# Patient Record
Sex: MALE | Race: WHITE | NOT HISPANIC OR LATINO | Employment: OTHER | URBAN - METROPOLITAN AREA
[De-identification: names, ages, dates, MRNs, and addresses within clinical notes are randomized per-mention and may not be internally consistent; named-entity substitution may affect disease eponyms.]

---

## 2017-02-21 ENCOUNTER — COMMUNICATION - HEALTHEAST (OUTPATIENT)
Dept: FAMILY MEDICINE | Facility: CLINIC | Age: 67
End: 2017-02-21

## 2017-02-21 DIAGNOSIS — G25.81 RESTLESS LEGS SYNDROME (RLS): ICD-10-CM

## 2017-02-22 ENCOUNTER — COMMUNICATION - HEALTHEAST (OUTPATIENT)
Dept: FAMILY MEDICINE | Facility: CLINIC | Age: 67
End: 2017-02-22

## 2017-07-25 ENCOUNTER — COMMUNICATION - HEALTHEAST (OUTPATIENT)
Dept: FAMILY MEDICINE | Facility: CLINIC | Age: 67
End: 2017-07-25

## 2017-07-25 DIAGNOSIS — I10 ESSENTIAL HYPERTENSION: ICD-10-CM

## 2017-09-26 ENCOUNTER — OFFICE VISIT - HEALTHEAST (OUTPATIENT)
Dept: FAMILY MEDICINE | Facility: CLINIC | Age: 67
End: 2017-09-26

## 2017-09-26 ENCOUNTER — RECORDS - HEALTHEAST (OUTPATIENT)
Dept: GENERAL RADIOLOGY | Facility: CLINIC | Age: 67
End: 2017-09-26

## 2017-09-26 ENCOUNTER — RECORDS - HEALTHEAST (OUTPATIENT)
Dept: ADMINISTRATIVE | Facility: OTHER | Age: 67
End: 2017-09-26

## 2017-09-26 DIAGNOSIS — M25.569 KNEE PAIN: ICD-10-CM

## 2017-09-26 DIAGNOSIS — M25.579 ANKLE PAIN: ICD-10-CM

## 2017-09-26 DIAGNOSIS — I10 HYPERTENSION: ICD-10-CM

## 2017-09-26 DIAGNOSIS — Z00.00 HEALTHCARE MAINTENANCE: ICD-10-CM

## 2017-09-26 DIAGNOSIS — M25.579 PAIN IN UNSPECIFIED ANKLE AND JOINTS OF UNSPECIFIED FOOT: ICD-10-CM

## 2017-09-26 LAB
CHOLEST SERPL-MCNC: 167 MG/DL
FASTING STATUS PATIENT QL REPORTED: YES
HDLC SERPL-MCNC: 40 MG/DL
LDLC SERPL CALC-MCNC: 98 MG/DL
PSA SERPL-MCNC: 7.2 NG/ML (ref 0–4.5)
TRIGL SERPL-MCNC: 143 MG/DL

## 2017-09-26 ASSESSMENT — MIFFLIN-ST. JEOR: SCORE: 1778.7

## 2017-09-28 ENCOUNTER — AMBULATORY - HEALTHEAST (OUTPATIENT)
Dept: FAMILY MEDICINE | Facility: CLINIC | Age: 67
End: 2017-09-28

## 2017-09-28 DIAGNOSIS — R97.20 ELEVATED PSA: ICD-10-CM

## 2017-10-03 ENCOUNTER — COMMUNICATION - HEALTHEAST (OUTPATIENT)
Dept: FAMILY MEDICINE | Facility: CLINIC | Age: 67
End: 2017-10-03

## 2017-10-23 ENCOUNTER — RECORDS - HEALTHEAST (OUTPATIENT)
Dept: ADMINISTRATIVE | Facility: OTHER | Age: 67
End: 2017-10-23

## 2017-12-01 ENCOUNTER — COMMUNICATION - HEALTHEAST (OUTPATIENT)
Dept: FAMILY MEDICINE | Facility: CLINIC | Age: 67
End: 2017-12-01

## 2017-12-01 ENCOUNTER — RECORDS - HEALTHEAST (OUTPATIENT)
Dept: ADMINISTRATIVE | Facility: OTHER | Age: 67
End: 2017-12-01

## 2017-12-10 ENCOUNTER — COMMUNICATION - HEALTHEAST (OUTPATIENT)
Dept: FAMILY MEDICINE | Facility: CLINIC | Age: 67
End: 2017-12-10

## 2017-12-10 DIAGNOSIS — G25.81 RESTLESS LEGS SYNDROME (RLS): ICD-10-CM

## 2017-12-18 ENCOUNTER — RECORDS - HEALTHEAST (OUTPATIENT)
Dept: ADMINISTRATIVE | Facility: OTHER | Age: 67
End: 2017-12-18

## 2017-12-22 ENCOUNTER — COMMUNICATION - HEALTHEAST (OUTPATIENT)
Dept: FAMILY MEDICINE | Facility: CLINIC | Age: 67
End: 2017-12-22

## 2018-01-24 ENCOUNTER — OFFICE VISIT - HEALTHEAST (OUTPATIENT)
Dept: FAMILY MEDICINE | Facility: CLINIC | Age: 68
End: 2018-01-24

## 2018-01-24 DIAGNOSIS — I48.0 PAROXYSMAL ATRIAL FIBRILLATION (H): ICD-10-CM

## 2018-01-24 DIAGNOSIS — Z01.818 PREOPERATIVE TESTING: ICD-10-CM

## 2018-01-24 DIAGNOSIS — C61 PROSTATE CANCER (H): ICD-10-CM

## 2018-01-24 LAB
ANION GAP SERPL CALCULATED.3IONS-SCNC: 9 MMOL/L (ref 5–18)
ATRIAL RATE - MUSE: 129 BPM
BUN SERPL-MCNC: 17 MG/DL (ref 8–22)
CALCIUM SERPL-MCNC: 9.9 MG/DL (ref 8.5–10.5)
CHLORIDE BLD-SCNC: 104 MMOL/L (ref 98–107)
CO2 SERPL-SCNC: 28 MMOL/L (ref 22–31)
CREAT SERPL-MCNC: 1.05 MG/DL (ref 0.7–1.3)
DIASTOLIC BLOOD PRESSURE - MUSE: NORMAL MMHG
GFR SERPL CREATININE-BSD FRML MDRD: >60 ML/MIN/1.73M2
GLUCOSE BLD-MCNC: 78 MG/DL (ref 70–125)
HGB BLD-MCNC: 15.5 G/DL (ref 14–18)
INTERPRETATION ECG - MUSE: NORMAL
P AXIS - MUSE: NORMAL DEGREES
POTASSIUM BLD-SCNC: 5.2 MMOL/L (ref 3.5–5)
PR INTERVAL - MUSE: NORMAL MS
QRS DURATION - MUSE: 98 MS
QT - MUSE: 350 MS
QTC - MUSE: 446 MS
R AXIS - MUSE: -19 DEGREES
SODIUM SERPL-SCNC: 141 MMOL/L (ref 136–145)
SYSTOLIC BLOOD PRESSURE - MUSE: NORMAL MMHG
T AXIS - MUSE: 14 DEGREES
VENTRICULAR RATE- MUSE: 98 BPM

## 2018-01-24 ASSESSMENT — MIFFLIN-ST. JEOR: SCORE: 1817.26

## 2018-01-26 ENCOUNTER — OFFICE VISIT - HEALTHEAST (OUTPATIENT)
Dept: CARDIOLOGY | Facility: CLINIC | Age: 68
End: 2018-01-26

## 2018-01-26 DIAGNOSIS — I48.0 PAROXYSMAL ATRIAL FIBRILLATION (H): ICD-10-CM

## 2018-01-26 DIAGNOSIS — I10 ESSENTIAL HYPERTENSION: ICD-10-CM

## 2018-01-26 LAB
ATRIAL RATE - MUSE: 69 BPM
DIASTOLIC BLOOD PRESSURE - MUSE: NORMAL MMHG
INTERPRETATION ECG - MUSE: NORMAL
P AXIS - MUSE: 31 DEGREES
PR INTERVAL - MUSE: 162 MS
QRS DURATION - MUSE: 106 MS
QT - MUSE: 420 MS
QTC - MUSE: 450 MS
R AXIS - MUSE: -29 DEGREES
SYSTOLIC BLOOD PRESSURE - MUSE: NORMAL MMHG
T AXIS - MUSE: 0 DEGREES
TSH SERPL DL<=0.005 MIU/L-ACNC: 1.55 UIU/ML (ref 0.3–5)
VENTRICULAR RATE- MUSE: 69 BPM

## 2018-01-26 ASSESSMENT — MIFFLIN-ST. JEOR: SCORE: 1808.19

## 2018-01-30 ENCOUNTER — HOSPITAL ENCOUNTER (OUTPATIENT)
Dept: CARDIOLOGY | Facility: HOSPITAL | Age: 68
Discharge: HOME | End: 2018-01-30
Attending: INTERNAL MEDICINE

## 2018-01-30 DIAGNOSIS — I48.0 PAROXYSMAL ATRIAL FIBRILLATION (H): ICD-10-CM

## 2018-01-30 LAB
AORTIC ROOT: 4 CM
AORTIC VALVE MEAN VELOCITY: 92.9 CM/S
ASCENDING AORTA: 4.3 CM
AV CUSP SEPERATION: 2.3 CM
AV CUSP SEPERATION: 2.3 CM
AV DIMENSIONLESS INDEX VTI: 0.7
AV MEAN GRADIENT: 4 MMHG
AV PEAK GRADIENT: 5.8 MMHG
AV VALVE AREA: 3 CM2
BSA FOR ECHO PROCEDURE: 2.25 M2
CV ECHO HEIGHT: 71 IN
CV ECHO WEIGHT: 224 LBS
DOP CALC AO PEAK VEL: 120 CM/S
DOP CALC AO VTI: 21.7 CM
DOP CALC LVOT AREA: 4.15 CM2
DOP CALC LVOT DIAMETER: 2.3 CM
DOP CALC LVOT STROKE VOLUME: 64.8 CM3
DOP CALC MV VTI: 15.9 CM
DOP CALCLVOT PEAK VEL VTI: 15.6 CM
EJECTION FRACTION: 63 % (ref 55–75)
FRACTIONAL SHORTENING: 29.4 % (ref 28–44)
INTERVENTRICULAR SEPTUM IN END DIASTOLE: 1.6 CM (ref 0.6–1)
IVS/PW RATIO: 1.2
LA AREA 2: 23.2 CM2
LEFT ATRIUM LENGTH: 5.92 CM
LEFT ATRIUM SIZE: 5.5 CM
LEFT ATRIUM TO AORTIC ROOT RATIO: 1.41 NO UNITS
LEFT VENTRICLE CARDIAC INDEX: 3.3 L/MIN/M2
LEFT VENTRICLE CARDIAC OUTPUT: 7.4 L/MIN
LEFT VENTRICLE DIASTOLIC VOLUME INDEX: 19.1 CM3/M2 (ref 34–74)
LEFT VENTRICLE DIASTOLIC VOLUME: 43 CM3 (ref 62–150)
LEFT VENTRICLE HEART RATE: 114 BPM
LEFT VENTRICLE MASS INDEX: 140.5 G/M2
LEFT VENTRICLE SYSTOLIC VOLUME INDEX: 7.1 CM3/M2 (ref 11–31)
LEFT VENTRICLE SYSTOLIC VOLUME: 16 CM3 (ref 21–61)
LEFT VENTRICULAR INTERNAL DIMENSION IN DIASTOLE: 5.1 CM (ref 4.2–5.8)
LEFT VENTRICULAR INTERNAL DIMENSION IN SYSTOLE: 3.6 CM (ref 2.5–4)
LEFT VENTRICULAR MASS: 316.2 G
LEFT VENTRICULAR OUTFLOW TRACT MEAN GRADIENT: 2 MMHG
LEFT VENTRICULAR OUTFLOW TRACT MEAN VELOCITY: 69.3 CM/S
LEFT VENTRICULAR POSTERIOR WALL IN END DIASTOLE: 1.3 CM (ref 0.6–1)
LV STROKE VOLUME INDEX: 28.8 ML/M2
MITRAL VALVE DECELERATION SLOPE: 4160 MM/S2
MITRAL VALVE MEAN INFLOW VELOCITY: 52.9 CM/S
MITRAL VALVE PEAK VELOCITY: 90.3 CM/S
MITRAL VALVE PRESSURE HALF-TIME: 65 MS
MV AREA VTI: 4.07 CM2
MV AVERAGE E/E' RATIO: 8.1 CM/S
MV DECELERATION TIME: 127 MS
MV E'TISSUE VEL-LAT: 15.2 CM/S
MV E'TISSUE VEL-MED: 7.22 CM/S
MV LATERAL E/E' RATIO: 6
MV MEAN GRADIENT: 1 MMHG
MV MEDIAL E/E' RATIO: 12.6
MV PEAK E VELOCITY: 90.8 CM/S
MV PEAK GRADIENT: 3.3 MMHG
MV VALVE AREA BY CONTINUITY EQUATION: 4.1 CM2
MV VALVE AREA PRESSURE 1/2 METHOD: 3.4 CM2
NUC REST DIASTOLIC VOLUME INDEX: 3584 LBS
NUC REST SYSTOLIC VOLUME INDEX: 71 IN
RIGHT VENTRICULAR INTERNAL DIMENSION IN DYSTOLE: 4.2 CM
TRICUSPID VALVE ANULAR PLANE SYSTOLIC EXCURSION: 1.9 CM

## 2018-01-30 ASSESSMENT — MIFFLIN-ST. JEOR: SCORE: 1808.19

## 2018-01-31 ENCOUNTER — COMMUNICATION - HEALTHEAST (OUTPATIENT)
Dept: CARDIOLOGY | Facility: CLINIC | Age: 68
End: 2018-01-31

## 2018-02-05 ASSESSMENT — MIFFLIN-ST. JEOR: SCORE: 1808.19

## 2018-02-06 ENCOUNTER — SURGERY - HEALTHEAST (OUTPATIENT)
Dept: SURGERY | Facility: HOSPITAL | Age: 68
End: 2018-02-06

## 2018-02-06 ENCOUNTER — ANESTHESIA - HEALTHEAST (OUTPATIENT)
Dept: SURGERY | Facility: HOSPITAL | Age: 68
End: 2018-02-06

## 2018-02-06 ASSESSMENT — MIFFLIN-ST. JEOR: SCORE: 1828.59

## 2018-02-07 ASSESSMENT — MIFFLIN-ST. JEOR: SCORE: 1826.78

## 2018-02-15 ENCOUNTER — RECORDS - HEALTHEAST (OUTPATIENT)
Dept: ADMINISTRATIVE | Facility: OTHER | Age: 68
End: 2018-02-15

## 2018-02-15 ENCOUNTER — COMMUNICATION - HEALTHEAST (OUTPATIENT)
Dept: CARDIOLOGY | Facility: CLINIC | Age: 68
End: 2018-02-15

## 2018-02-16 ENCOUNTER — COMMUNICATION - HEALTHEAST (OUTPATIENT)
Dept: CARDIOLOGY | Facility: CLINIC | Age: 68
End: 2018-02-16

## 2018-02-16 DIAGNOSIS — I48.91 A-FIB (H): ICD-10-CM

## 2018-03-02 ENCOUNTER — HOSPITAL ENCOUNTER (OUTPATIENT)
Dept: CARDIOLOGY | Facility: HOSPITAL | Age: 68
Discharge: HOME | End: 2018-03-02
Attending: INTERNAL MEDICINE

## 2018-03-02 DIAGNOSIS — I48.91 A-FIB (H): ICD-10-CM

## 2018-03-06 ENCOUNTER — COMMUNICATION - HEALTHEAST (OUTPATIENT)
Dept: CARDIOLOGY | Facility: CLINIC | Age: 68
End: 2018-03-06

## 2018-03-06 DIAGNOSIS — I48.91 A-FIB (H): ICD-10-CM

## 2018-03-13 ENCOUNTER — COMMUNICATION - HEALTHEAST (OUTPATIENT)
Dept: FAMILY MEDICINE | Facility: CLINIC | Age: 68
End: 2018-03-13

## 2018-03-13 DIAGNOSIS — I48.0 PAROXYSMAL ATRIAL FIBRILLATION (H): ICD-10-CM

## 2018-03-19 ENCOUNTER — OFFICE VISIT - HEALTHEAST (OUTPATIENT)
Dept: CARDIOLOGY | Facility: CLINIC | Age: 68
End: 2018-03-19

## 2018-03-19 DIAGNOSIS — I10 ESSENTIAL HYPERTENSION: ICD-10-CM

## 2018-03-19 DIAGNOSIS — I48.0 PAROXYSMAL ATRIAL FIBRILLATION (H): ICD-10-CM

## 2018-03-19 ASSESSMENT — MIFFLIN-ST. JEOR: SCORE: 1820.65

## 2018-04-06 ENCOUNTER — HOSPITAL ENCOUNTER (OUTPATIENT)
Dept: CARDIOLOGY | Facility: HOSPITAL | Age: 68
Discharge: HOME | End: 2018-04-06
Attending: INTERNAL MEDICINE

## 2018-04-06 DIAGNOSIS — I48.0 PAROXYSMAL ATRIAL FIBRILLATION (H): ICD-10-CM

## 2018-04-07 ENCOUNTER — COMMUNICATION - HEALTHEAST (OUTPATIENT)
Dept: FAMILY MEDICINE | Facility: CLINIC | Age: 68
End: 2018-04-07

## 2018-04-07 DIAGNOSIS — I48.0 PAROXYSMAL ATRIAL FIBRILLATION (H): ICD-10-CM

## 2018-04-16 ENCOUNTER — OFFICE VISIT - HEALTHEAST (OUTPATIENT)
Dept: FAMILY MEDICINE | Facility: CLINIC | Age: 68
End: 2018-04-16

## 2018-04-16 DIAGNOSIS — Z01.818 PREOPERATIVE EXAMINATION: ICD-10-CM

## 2018-04-16 DIAGNOSIS — M25.561 RIGHT KNEE PAIN: ICD-10-CM

## 2018-04-16 LAB
ALBUMIN SERPL-MCNC: 3.9 G/DL (ref 3.5–5)
ALP SERPL-CCNC: 125 U/L (ref 45–120)
ALT SERPL W P-5'-P-CCNC: 25 U/L (ref 0–45)
ANION GAP SERPL CALCULATED.3IONS-SCNC: 10 MMOL/L (ref 5–18)
AST SERPL W P-5'-P-CCNC: 24 U/L (ref 0–40)
BILIRUB SERPL-MCNC: 0.7 MG/DL (ref 0–1)
BUN SERPL-MCNC: 15 MG/DL (ref 8–22)
CALCIUM SERPL-MCNC: 9.8 MG/DL (ref 8.5–10.5)
CHLORIDE BLD-SCNC: 105 MMOL/L (ref 98–107)
CO2 SERPL-SCNC: 24 MMOL/L (ref 22–31)
CREAT SERPL-MCNC: 0.93 MG/DL (ref 0.7–1.3)
ERYTHROCYTE [DISTWIDTH] IN BLOOD BY AUTOMATED COUNT: 11.8 % (ref 11–14.5)
GFR SERPL CREATININE-BSD FRML MDRD: >60 ML/MIN/1.73M2
GLUCOSE BLD-MCNC: 72 MG/DL (ref 70–125)
HCT VFR BLD AUTO: 47.5 % (ref 40–54)
HGB BLD-MCNC: 16.2 G/DL (ref 14–18)
INR PPP: 1.07 (ref 0.9–1.1)
MCH RBC QN AUTO: 30.7 PG (ref 27–34)
MCHC RBC AUTO-ENTMCNC: 34.2 G/DL (ref 32–36)
MCV RBC AUTO: 90 FL (ref 80–100)
PLATELET # BLD AUTO: 192 THOU/UL (ref 140–440)
PMV BLD AUTO: 7.3 FL (ref 7–10)
POTASSIUM BLD-SCNC: 4.7 MMOL/L (ref 3.5–5)
PROT SERPL-MCNC: 6.6 G/DL (ref 6–8)
RBC # BLD AUTO: 5.29 MILL/UL (ref 4.4–6.2)
SODIUM SERPL-SCNC: 139 MMOL/L (ref 136–145)
WBC: 7.4 THOU/UL (ref 4–11)

## 2018-04-16 ASSESSMENT — MIFFLIN-ST. JEOR: SCORE: 1840.5

## 2018-04-18 ENCOUNTER — COMMUNICATION - HEALTHEAST (OUTPATIENT)
Dept: FAMILY MEDICINE | Facility: CLINIC | Age: 68
End: 2018-04-18

## 2018-04-19 ENCOUNTER — COMMUNICATION - HEALTHEAST (OUTPATIENT)
Dept: FAMILY MEDICINE | Facility: CLINIC | Age: 68
End: 2018-04-19

## 2018-05-01 ASSESSMENT — MIFFLIN-ST. JEOR: SCORE: 1828.59

## 2018-05-02 ENCOUNTER — ANESTHESIA - HEALTHEAST (OUTPATIENT)
Dept: SURGERY | Facility: CLINIC | Age: 68
End: 2018-05-02

## 2018-05-03 ENCOUNTER — SURGERY - HEALTHEAST (OUTPATIENT)
Dept: SURGERY | Facility: CLINIC | Age: 68
End: 2018-05-03

## 2018-05-03 ASSESSMENT — MIFFLIN-ST. JEOR
SCORE: 1828.59
SCORE: 1828.59

## 2018-05-11 ENCOUNTER — OFFICE VISIT - HEALTHEAST (OUTPATIENT)
Dept: FAMILY MEDICINE | Facility: CLINIC | Age: 68
End: 2018-05-11

## 2018-05-11 DIAGNOSIS — Z09 HOSPITAL DISCHARGE FOLLOW-UP: ICD-10-CM

## 2018-05-11 DIAGNOSIS — Z96.651 STATUS POST TOTAL RIGHT KNEE REPLACEMENT: ICD-10-CM

## 2018-05-11 ASSESSMENT — MIFFLIN-ST. JEOR: SCORE: 1869.7

## 2018-05-16 ENCOUNTER — RECORDS - HEALTHEAST (OUTPATIENT)
Dept: ADMINISTRATIVE | Facility: OTHER | Age: 68
End: 2018-05-16

## 2018-05-21 ENCOUNTER — RECORDS - HEALTHEAST (OUTPATIENT)
Dept: ADMINISTRATIVE | Facility: OTHER | Age: 68
End: 2018-05-21

## 2018-05-24 ENCOUNTER — COMMUNICATION - HEALTHEAST (OUTPATIENT)
Dept: ONCOLOGY | Facility: CLINIC | Age: 68
End: 2018-05-24

## 2018-06-12 ENCOUNTER — COMMUNICATION - HEALTHEAST (OUTPATIENT)
Dept: FAMILY MEDICINE | Facility: CLINIC | Age: 68
End: 2018-06-12

## 2018-06-12 DIAGNOSIS — I48.0 PAROXYSMAL ATRIAL FIBRILLATION (H): ICD-10-CM

## 2018-08-16 ENCOUNTER — RECORDS - HEALTHEAST (OUTPATIENT)
Dept: ADMINISTRATIVE | Facility: OTHER | Age: 68
End: 2018-08-16

## 2018-09-16 ENCOUNTER — COMMUNICATION - HEALTHEAST (OUTPATIENT)
Dept: FAMILY MEDICINE | Facility: CLINIC | Age: 68
End: 2018-09-16

## 2018-09-16 DIAGNOSIS — G25.81 RESTLESS LEGS SYNDROME (RLS): ICD-10-CM

## 2018-09-20 ENCOUNTER — COMMUNICATION - HEALTHEAST (OUTPATIENT)
Dept: ADMINISTRATIVE | Facility: CLINIC | Age: 68
End: 2018-09-20

## 2018-10-11 ENCOUNTER — COMMUNICATION - HEALTHEAST (OUTPATIENT)
Dept: FAMILY MEDICINE | Facility: CLINIC | Age: 68
End: 2018-10-11

## 2018-10-11 DIAGNOSIS — I48.0 PAROXYSMAL ATRIAL FIBRILLATION (H): ICD-10-CM

## 2018-10-12 ENCOUNTER — RECORDS - HEALTHEAST (OUTPATIENT)
Dept: ADMINISTRATIVE | Facility: OTHER | Age: 68
End: 2018-10-12

## 2018-10-17 ENCOUNTER — RECORDS - HEALTHEAST (OUTPATIENT)
Dept: ADMINISTRATIVE | Facility: OTHER | Age: 68
End: 2018-10-17

## 2018-10-25 ENCOUNTER — OFFICE VISIT - HEALTHEAST (OUTPATIENT)
Dept: CARDIOLOGY | Facility: CLINIC | Age: 68
End: 2018-10-25

## 2018-10-25 DIAGNOSIS — I10 ESSENTIAL HYPERTENSION: ICD-10-CM

## 2018-10-25 DIAGNOSIS — I48.0 PAROXYSMAL ATRIAL FIBRILLATION (H): ICD-10-CM

## 2018-10-25 ASSESSMENT — MIFFLIN-ST. JEOR: SCORE: 1819.52

## 2018-11-28 ENCOUNTER — RECORDS - HEALTHEAST (OUTPATIENT)
Dept: ADMINISTRATIVE | Facility: OTHER | Age: 68
End: 2018-11-28

## 2019-01-17 ENCOUNTER — COMMUNICATION - HEALTHEAST (OUTPATIENT)
Dept: FAMILY MEDICINE | Facility: CLINIC | Age: 69
End: 2019-01-17

## 2019-01-17 DIAGNOSIS — I48.0 PAROXYSMAL ATRIAL FIBRILLATION (H): ICD-10-CM

## 2019-02-05 ENCOUNTER — RECORDS - HEALTHEAST (OUTPATIENT)
Dept: ADMINISTRATIVE | Facility: OTHER | Age: 69
End: 2019-02-05

## 2019-06-08 ENCOUNTER — COMMUNICATION - HEALTHEAST (OUTPATIENT)
Dept: FAMILY MEDICINE | Facility: CLINIC | Age: 69
End: 2019-06-08

## 2019-06-08 DIAGNOSIS — I48.0 PAROXYSMAL ATRIAL FIBRILLATION (H): ICD-10-CM

## 2019-06-08 DIAGNOSIS — G25.81 RESTLESS LEGS SYNDROME (RLS): ICD-10-CM

## 2019-06-19 ENCOUNTER — OFFICE VISIT - HEALTHEAST (OUTPATIENT)
Dept: FAMILY MEDICINE | Facility: CLINIC | Age: 69
End: 2019-06-19

## 2019-06-19 DIAGNOSIS — I10 ESSENTIAL HYPERTENSION: ICD-10-CM

## 2019-06-19 DIAGNOSIS — I48.0 PAROXYSMAL ATRIAL FIBRILLATION (H): ICD-10-CM

## 2019-06-19 DIAGNOSIS — G25.81 RESTLESS LEGS SYNDROME (RLS): ICD-10-CM

## 2019-06-19 DIAGNOSIS — Z00.01 ENCOUNTER FOR GENERAL ADULT MEDICAL EXAMINATION WITH ABNORMAL FINDINGS: ICD-10-CM

## 2019-06-19 DIAGNOSIS — Z00.00 ROUTINE GENERAL MEDICAL EXAMINATION AT A HEALTH CARE FACILITY: ICD-10-CM

## 2019-06-19 DIAGNOSIS — C61 PROSTATE CANCER (H): ICD-10-CM

## 2019-06-19 LAB
ANION GAP SERPL CALCULATED.3IONS-SCNC: 5 MMOL/L (ref 5–18)
BUN SERPL-MCNC: 15 MG/DL (ref 8–22)
CALCIUM SERPL-MCNC: 9.6 MG/DL (ref 8.5–10.5)
CHLORIDE BLD-SCNC: 106 MMOL/L (ref 98–107)
CHOLEST SERPL-MCNC: 196 MG/DL
CO2 SERPL-SCNC: 28 MMOL/L (ref 22–31)
CREAT SERPL-MCNC: 1.05 MG/DL (ref 0.7–1.3)
FASTING STATUS PATIENT QL REPORTED: YES
GFR SERPL CREATININE-BSD FRML MDRD: >60 ML/MIN/1.73M2
GLUCOSE BLD-MCNC: 104 MG/DL (ref 70–125)
HDLC SERPL-MCNC: 37 MG/DL
LDLC SERPL CALC-MCNC: 122 MG/DL
POTASSIUM BLD-SCNC: 4.6 MMOL/L (ref 3.5–5)
SODIUM SERPL-SCNC: 139 MMOL/L (ref 136–145)
TRIGL SERPL-MCNC: 185 MG/DL

## 2019-06-19 ASSESSMENT — MIFFLIN-ST. JEOR: SCORE: 1858.52

## 2019-06-21 ENCOUNTER — COMMUNICATION - HEALTHEAST (OUTPATIENT)
Dept: CARDIOLOGY | Facility: CLINIC | Age: 69
End: 2019-06-21

## 2019-06-27 ENCOUNTER — COMMUNICATION - HEALTHEAST (OUTPATIENT)
Dept: FAMILY MEDICINE | Facility: CLINIC | Age: 69
End: 2019-06-27

## 2019-06-27 DIAGNOSIS — E78.5 HYPERLIPIDEMIA LDL GOAL <100: ICD-10-CM

## 2019-07-05 ENCOUNTER — RECORDS - HEALTHEAST (OUTPATIENT)
Dept: ADMINISTRATIVE | Facility: OTHER | Age: 69
End: 2019-07-05

## 2019-07-12 ENCOUNTER — COMMUNICATION - HEALTHEAST (OUTPATIENT)
Dept: FAMILY MEDICINE | Facility: CLINIC | Age: 69
End: 2019-07-12

## 2019-07-12 DIAGNOSIS — I48.0 PAROXYSMAL ATRIAL FIBRILLATION (H): ICD-10-CM

## 2019-08-17 ENCOUNTER — COMMUNICATION - HEALTHEAST (OUTPATIENT)
Dept: FAMILY MEDICINE | Facility: CLINIC | Age: 69
End: 2019-08-17

## 2019-08-17 DIAGNOSIS — G25.81 RESTLESS LEGS SYNDROME (RLS): ICD-10-CM

## 2019-08-27 ENCOUNTER — COMMUNICATION - HEALTHEAST (OUTPATIENT)
Dept: ADMINISTRATIVE | Facility: CLINIC | Age: 69
End: 2019-08-27

## 2019-09-04 ENCOUNTER — COMMUNICATION - HEALTHEAST (OUTPATIENT)
Dept: FAMILY MEDICINE | Facility: CLINIC | Age: 69
End: 2019-09-04

## 2019-09-04 DIAGNOSIS — I48.0 PAROXYSMAL ATRIAL FIBRILLATION (H): ICD-10-CM

## 2019-09-26 ENCOUNTER — RECORDS - HEALTHEAST (OUTPATIENT)
Dept: ADMINISTRATIVE | Facility: OTHER | Age: 69
End: 2019-09-26

## 2019-10-01 ENCOUNTER — OFFICE VISIT - HEALTHEAST (OUTPATIENT)
Dept: CARDIOLOGY | Facility: CLINIC | Age: 69
End: 2019-10-01

## 2019-10-01 DIAGNOSIS — I10 ESSENTIAL HYPERTENSION: ICD-10-CM

## 2019-10-01 DIAGNOSIS — I48.0 PAROXYSMAL ATRIAL FIBRILLATION (H): ICD-10-CM

## 2019-10-01 ASSESSMENT — MIFFLIN-ST. JEOR: SCORE: 1825.92

## 2019-11-14 ENCOUNTER — COMMUNICATION - HEALTHEAST (OUTPATIENT)
Dept: CARDIOLOGY | Facility: CLINIC | Age: 69
End: 2019-11-14

## 2019-11-18 ENCOUNTER — AMBULATORY - HEALTHEAST (OUTPATIENT)
Dept: CARDIOLOGY | Facility: CLINIC | Age: 69
End: 2019-11-18

## 2019-11-18 DIAGNOSIS — I48.0 PAROXYSMAL ATRIAL FIBRILLATION (H): ICD-10-CM

## 2019-11-18 DIAGNOSIS — Z00.6 CLINICAL TRIAL EXAM: ICD-10-CM

## 2019-11-20 ENCOUNTER — AMBULATORY - HEALTHEAST (OUTPATIENT)
Dept: CARDIOLOGY | Facility: CLINIC | Age: 69
End: 2019-11-20

## 2019-11-20 DIAGNOSIS — I48.0 PAROXYSMAL ATRIAL FIBRILLATION (H): ICD-10-CM

## 2019-11-20 DIAGNOSIS — Z00.6 CLINICAL TRIAL EXAM: ICD-10-CM

## 2019-11-21 ENCOUNTER — COMMUNICATION - HEALTHEAST (OUTPATIENT)
Dept: CARDIOLOGY | Facility: CLINIC | Age: 69
End: 2019-11-21

## 2019-11-26 ENCOUNTER — COMMUNICATION - HEALTHEAST (OUTPATIENT)
Dept: FAMILY MEDICINE | Facility: CLINIC | Age: 69
End: 2019-11-26

## 2019-12-04 ENCOUNTER — OFFICE VISIT - HEALTHEAST (OUTPATIENT)
Dept: FAMILY MEDICINE | Facility: CLINIC | Age: 69
End: 2019-12-04

## 2019-12-04 DIAGNOSIS — I48.0 PAROXYSMAL ATRIAL FIBRILLATION (H): ICD-10-CM

## 2019-12-04 ASSESSMENT — MIFFLIN-ST. JEOR: SCORE: 1841.18

## 2019-12-14 ENCOUNTER — COMMUNICATION - HEALTHEAST (OUTPATIENT)
Dept: FAMILY MEDICINE | Facility: CLINIC | Age: 69
End: 2019-12-14

## 2019-12-23 ENCOUNTER — COMMUNICATION - HEALTHEAST (OUTPATIENT)
Dept: FAMILY MEDICINE | Facility: CLINIC | Age: 69
End: 2019-12-23

## 2019-12-23 DIAGNOSIS — I48.0 PAROXYSMAL ATRIAL FIBRILLATION (H): ICD-10-CM

## 2020-01-22 DIAGNOSIS — I10 BENIGN ESSENTIAL HYPERTENSION: ICD-10-CM

## 2020-01-22 DIAGNOSIS — Z71.89 COUNSELING ON HEALTH PROMOTION AND DISEASE PREVENTION: Primary | ICD-10-CM

## 2020-01-22 DIAGNOSIS — R06.00 DYSPNEA, UNSPECIFIED TYPE: ICD-10-CM

## 2020-01-22 DIAGNOSIS — I48.91 ATRIAL FIBRILLATION, UNSPECIFIED TYPE (H): ICD-10-CM

## 2020-01-22 DIAGNOSIS — R73.09 ELEVATED GLUCOSE: ICD-10-CM

## 2020-01-27 ENCOUNTER — RECORDS - HEALTHEAST (OUTPATIENT)
Dept: ADMINISTRATIVE | Facility: OTHER | Age: 70
End: 2020-01-27

## 2020-01-27 ENCOUNTER — OFFICE VISIT (OUTPATIENT)
Dept: CARDIOLOGY | Facility: CLINIC | Age: 70
End: 2020-01-27
Payer: COMMERCIAL

## 2020-01-27 VITALS
OXYGEN SATURATION: 98 % | SYSTOLIC BLOOD PRESSURE: 141 MMHG | HEART RATE: 64 BPM | DIASTOLIC BLOOD PRESSURE: 89 MMHG | WEIGHT: 226 LBS | HEIGHT: 72 IN | BODY MASS INDEX: 30.61 KG/M2

## 2020-01-27 DIAGNOSIS — R06.00 DYSPNEA, UNSPECIFIED TYPE: ICD-10-CM

## 2020-01-27 DIAGNOSIS — C61 PROSTATE CANCER (H): ICD-10-CM

## 2020-01-27 DIAGNOSIS — Z71.89 COUNSELING ON HEALTH PROMOTION AND DISEASE PREVENTION: ICD-10-CM

## 2020-01-27 DIAGNOSIS — I48.91 ATRIAL FIBRILLATION, UNSPECIFIED TYPE (H): ICD-10-CM

## 2020-01-27 DIAGNOSIS — I48.21 PERMANENT ATRIAL FIBRILLATION (H): Primary | ICD-10-CM

## 2020-01-27 DIAGNOSIS — R09.89 OTHER SPECIFIED SYMPTOMS AND SIGNS INVOLVING THE CIRCULATORY AND RESPIRATORY SYSTEMS: ICD-10-CM

## 2020-01-27 DIAGNOSIS — Z09 ENCOUNTER FOR FOLLOW-UP EXAMINATION AFTER COMPLETED TREATMENT FOR CONDITIONS OTHER THAN MALIGNANT NEOPLASM: ICD-10-CM

## 2020-01-27 DIAGNOSIS — I10 BENIGN ESSENTIAL HYPERTENSION: ICD-10-CM

## 2020-01-27 DIAGNOSIS — I10 ESSENTIAL HYPERTENSION: ICD-10-CM

## 2020-01-27 DIAGNOSIS — R73.09 ELEVATED GLUCOSE: ICD-10-CM

## 2020-01-27 LAB
CHOLEST SERPL-MCNC: 176 MG/DL
CREAT UR-MCNC: 135 MG/DL
CRP SERPL HS-MCNC: 2.2 MG/L
FEF 25/75: NORMAL
FEV-1: NORMAL
FEV1/FVC: NORMAL
FVC: NORMAL
GLUCOSE SERPL-MCNC: 92 MG/DL (ref 70–99)
HDLC SERPL-MCNC: 44 MG/DL
LDLC SERPL CALC-MCNC: 101 MG/DL
MICROALBUMIN UR-MCNC: 35 MG/L
MICROALBUMIN/CREAT UR: 25.7 MG/G CR (ref 0–17)
NONHDLC SERPL-MCNC: 133 MG/DL
NT-PROBNP SERPL-MCNC: 1325 PG/ML (ref 0–125)
TRIGL SERPL-MCNC: 156 MG/DL

## 2020-01-27 RX ORDER — PRAMIPEXOLE DIHYDROCHLORIDE 0.5 MG/1
0.25 TABLET ORAL 2 TIMES DAILY
COMMUNITY
Start: 2019-06-19 | End: 2021-08-18

## 2020-01-27 RX ORDER — ATENOLOL 50 MG/1
50 TABLET ORAL DAILY
COMMUNITY
Start: 2019-12-04 | End: 2021-11-01

## 2020-01-27 RX ORDER — SILDENAFIL 50 MG/1
100 TABLET, FILM COATED ORAL PRN
COMMUNITY
End: 2021-11-01

## 2020-01-27 SDOH — HEALTH STABILITY: MENTAL HEALTH: HOW OFTEN DO YOU HAVE 6 OR MORE DRINKS ON ONE OCCASION?: NEVER

## 2020-01-27 SDOH — HEALTH STABILITY: MENTAL HEALTH: HOW MANY STANDARD DRINKS CONTAINING ALCOHOL DO YOU HAVE ON A TYPICAL DAY?: 1 OR 2

## 2020-01-27 SDOH — HEALTH STABILITY: MENTAL HEALTH: HOW OFTEN DO YOU HAVE A DRINK CONTAINING ALCOHOL?: 4 OR MORE TIMES A WEEK

## 2020-01-27 ASSESSMENT — MIFFLIN-ST. JEOR: SCORE: 1838.13

## 2020-01-27 NOTE — PROGRESS NOTES
[facility] for Cardiovascular Disease Prevention - Exam Note    Active Problems   Patient Active Problem List    Diagnosis Date Noted     Atrial fibrillation (H)      Priority: Medium     Hypertension      Priority: Medium     Prostate cancer (H) 01/01/2018     Priority: Medium       Reason For Visit   Patient here for [facility] early detection of atherosclerosis and CVD exam.    Pain Evaluation  Current history of pain associated with this visit is: denied    HPI   Rogers Huffman is a 69 year old year old male with a history of persistent atrial fibrillation, hypertension, prostate cancer s/p prostatectomy in 2018, restless legs syndrome and s/p left knee replacement in 2018.  Today in clinic he denies chest pain at rest, with exercise or in the middle of the noc. He denies SOB at rest or with exercise, PND,  palpitations, calf pain, heart burn, lightheadedness or abdominal pain or ankle edema.  Dr. Kaiser Joseph  Is cardiologist in .        Nutrition assessment per patient report:   Foods with fat/cholesterol (fried foods, fatty meats, junk food):  does not eat on a regular basis   Fruits and vegetables (  cup cooked, 1 cup raw):  2 serving of vegetables/day. 2 servings of fruit/day  Caffeine (1 cup coffee, soda, etc):  3 cups of coffee  Alcohol servings (12 oz. beer, 4 oz. wine, 1  oz. in mixed drink):  1 glass of wine/5 days week  Calcium servings (dairy foods, 8 oz. milk, yogurt, cheese, ice cream):  milk with breakfast, yogurt every other day  Salt/sodium use:  rarely  Special dietary habits:  None    Activity  Patient is active 3 times per week for 50 or more minutes with Nordic skiing in the winter months and biking in the other seasons.  He bikes 5 times/week for 1-3 hours during the warmer weather.      Laboratory Results Review  We discussed laboratory results today including lipids targets and how foods influence cholesterol.    Weight  His perceived healthy weight is 200 pounds.  A normal BMI  of 25 is equal to 184 pounds.  The current BMI of 30.65 is high-risk range.  A weight reduction speed of 2-3 lbs per month for men is recommended.    PMH   Past Medical History:   Diagnosis Date     Atrial fibrillation (H)      Hypertension      Prostate cancer (H) 2018       PSH  Past Surgical History:   Procedure Laterality Date     APPENDECTOMY       [place] TOTAL KNEE REPLACEMENT  2018    right     PROSTATECTOMY, LYMPHADENECTOMY  2018     SHOULDER SURGERY  1985    left     TONSILLECTOMY         Current Meds   No current outpatient medications on file.       Allergies    Allergies not on file    Family Hx   Family History   Problem Relation Age of Onset     Leukemia Mother      Pulmonary fibrosis Father      Hypertension Brother      Hypertension Brother      Hyperlipidemia Brother        Social History  Rogers is retired in 2015.  He worked as an  at [employer] and is retired. His life is semi-active.  He is  with 3 adopted children.     Enjoyment of life is 8 with 10 enjoys life fully.    Tobacco History  History   Smoking Status     Former Smoker     Packs/day: 0.50     Years: 3.00     Types: Cigarettes   Smokeless Tobacco     Not on file     Comment: Smoked during college       ROS  CONSTITUTIONAL:  No fever, chills, or sweats. No weight gain/loss.   EENT:  No visual disturbance, ear ache, epistaxis, sore throat  ALLERGIES/IMMUNOLOGIC:  Negative  RESPIRATORY:  No cough, hemoptysis  CARDIOVASCULAR:  As per HPI  GI:  No nausea, vomiting, hematemesis, melena  :  No urinary frequency, dysuria, or hematuria  INTEGUMENT:  Negative  PSYCHIATRIC:  Negative  NEURO:  Negative  ENDOCRINE:  Negative  MUSCULOSKELETAL:  Negative     Vital Signs   Pulse 64   Ht 1.829 m (6')   Wt 102.5 kg (226 lb)   SpO2 98%   BMI 30.65 kg/m        Waist: 42.5 inches  Hip: 42 inches    Physical Exam   In general, WDWN elderly man in NAD, affect pleasant. Gain steady.  HEENT: NC/AT.  PERRLA.  EOMI.  Sclerae white, not injected.  Nares  clear.  Pharynx without erythema or exudate.  Dentition intact.    Neck: No adenopathy.  No thyromegaly.Carotids +4/4 bilaterally without bruits.  No jugular venous distension.   Lungs: Breath sounds clear anthony, no crackles, wheezing, or rhonchi noted.  Cor: Irregular, irregular heart rate.  S1, S2. No murmur, rub, click, or gallop. The PMI is in the 5th ICS in the midclavicular line.  Abdomen: Soft, nontender, nondistended. BS present in all 4 quadrants.  No hepatomegaly.  No abdominal aorta or renal artery bruits noted.  Extremities: No clubbing, cyanosis, or edema. The DP and PT pulses are +2/2 anthony.   Recent Labs  Lab Results   Component Value Date    GLC 92 01/27/2020      Lab Results   Component Value Date    NTBNP 1,325 (H) 01/27/2020     No results found for: NTBNPI   Lab Results   Component Value Date    UCRR 135 01/27/2020      Lab Results   Component Value Date    MICROL 35 01/27/2020      No results found for: MICROALBUMIN   No results found for: CRP   Lab Results   Component Value Date    CHOL 176 01/27/2020      Lab Results   Component Value Date    TRIG 156 (H) 01/27/2020      Lab Results   Component Value Date    HDL 44 01/27/2020      Lab Results   Component Value Date     (H) 01/27/2020      No results found for: VLDL   No results found for: CHOLHDLRATIO  Lab Results   Component Value Date    NHDL 133 (H) 01/27/2020          Reyna Test Results    BASIC SPIROMETRY: Summary of two attempts (see printout for details of results)  Results Estimated range for ht/age   FVC: 3.88 liter FVC: 3.89-5.86 liter   FEV1: 3.37 liter FEV1: 2.79-4.46 liter     History of asthma:  NO   History of respiratory infection current/recent:  NO    Spirometry Results:  normal      WALKING BLOOD PRESSURE RESPONSE (3 minute, 5 MET level walk)   Pre BP: 118/70 mmHg  3 min BP: 128/66 mmHg  1 min post BP:120/77 mmHg    Pre HR: 92 bpm  3 min HR: 110 bpm  1 min post HR: 88 bpm       RETINAL VASCULAR ASSESSMENT   Left Eye  Abnormality:  none  AV Ratio: 0.88    Right Eye Abnormality:  none  AV Ratio: 0.82     Retinal Assessment:  normal      ABDOMINAL AORTA ULTRASOUND (< 2.5 normal, borderline 2.5-2.9, abnormal > 3)   SupraIliac 1.93 cm    SupraRenal 2.25 cm    InfraRenal Proximal 1.90 cm    InfraRenal Distal 1.95 cm      Abdominal Aorta Assessment:  normal      LEFT VENTRICULAR ULTRASOUND MEASUREMENTS (adjusted for BSA)  LVIDD 42.8 mm   Septa 10.3mm   Posterior 9.7 mm     Left Ventricular US Assessment:  normal      Carotid Artery IMT measurements report and plaques in the small area examined:   Left IMT 0.612 mm  Plaques none    Right IMT 0.690 mm  Plaques small heterogeneous plaque in right bulb area size 1.2 mm.        ECG (see tracing):  normal sinus rhythm;  rate: 81 bpm      Arterial Elasticity per age and gender (see printout):   C1 13.6 mL/mmHg x 10  normal   C2 4.4 mL/mmHg x 100 normal   Supine blood pressure: 142/101 mmHg     Echocardiogram 30 January 2018:  1. Normal left ventricular size and systolic performance with ejection fraction of 60-65%.  2. No significant valvular heart disease.  3. Normal right ventricular size and systolic performance.  4. Moderate left atrial enlargement. Mild right atrial enlargement.    Holter monitor 6 April 2018:  1. Abnormal Holter monitor tracing by virtue of the persistent atrial fibrillation demonstrated throughout the recording interval.  2. The ventricular response appears to be well controlled.  3. No symptomatic recordings therefore correlation with patient's clinical events cannot be made.  4. No significant ventricular arrhythmia  5. No profound bradycardia or pauses      Assessment:      1. Cardiovascular:  Asymptomatic, he is not complaining of chest pain. ECG results atrial fibrillation, HR 81 bpm.  He is on Eliquis 5 mg BID.  CHADS2-Vasc score is 2. 2.9% annual embolic risk.      2. Blood Pressure:  Blood pressure in clinic today, /89.   He is taking 50 mg of atenolol.    Standing blood pressure is 118/70.     3. Lipids:Lipids:    HDL 44  Trig  156  He does not take a statin medicaiton.    4. Glucose:  Fasting glucose 92       5. Exercise routine:  He is active Nordic skiing in the winter and biking in the summer months.      6. Nutrition.  He eats a healthy diet but does not eat many vegetables and fruit on a daily basis.       7.  Consider ordering a coronary artery calcium score to further assess his cardiac risk.      8.  Consider a sleep study if risk factors are present.     7.  Return to [facility] CV Prevention clinic in 1-2  years.    Health Habit Summary:  Nutrition: Heart Healthy Eating:  most of the time   Exercise:  regularly active  Weight:  high-risk range  Tobacco Use:  past tobacco use; 2 pack year history    Full report to follow prevention team review of test results with scanned final report.    Time spent for patient visit was 60 minutes with more than half the time spent on counseling and coordination of care.    TREVA Robison CNP       CC  Patient Care Team:  Vivi Miguel MD as PCP - General (Family Practice)  SELF, REFERRED  Kaiser Joseph MD Cardiology

## 2020-01-27 NOTE — LETTER
1/27/2020    RE: Rogers Huffman  [address]  [place] [address]       Dear Colleague,    Thank you for the opportunity to participate in the care of your patient, Rogers Huffman, at the [facility] CARDIOVASCULAR DISEASE PREVENTION at [facility]. Please see a copy of my visit note below.    [facility] for Cardiovascular Disease Prevention - Exam Note    Active Problems   Patient Active Problem List    Diagnosis Date Noted     Atrial fibrillation (H)      Priority: Medium     Hypertension      Priority: Medium     Prostate cancer (H) 01/01/2018     Priority: Medium       Reason For Visit   Patient here for [facility] early detection of atherosclerosis and CVD exam.    Pain Evaluation  Current history of pain associated with this visit is: denied    HPI   Rogers Huffman is a 69 year old year old male with a history of persistent atrial fibrillation, hypertension, prostate cancer s/p prostatectomy in 2018, restless legs syndrome and s/p left knee replacement in 2018.  Today in clinic he denies chest pain at rest, with exercise or in the middle of the noc. He denies SOB at rest or with exercise, PND,  palpitations, calf pain, heart burn, lightheadedness or abdominal pain or ankle edema.  Dr. Kaiser Joseph  Is cardiologist in .        Nutrition assessment per patient report:   Foods with fat/cholesterol (fried foods, fatty meats, junk food):  does not eat on a regular basis   Fruits and vegetables (  cup cooked, 1 cup raw):  2 serving of vegetables/day. 2 servings of fruit/day  Caffeine (1 cup coffee, soda, etc):  3 cups of coffee  Alcohol servings (12 oz. beer, 4 oz. wine, 1  oz. in mixed drink):  1 glass of wine/5 days week  Calcium servings (dairy foods, 8 oz. milk, yogurt, cheese, ice cream):  milk with breakfast, yogurt every other day  Salt/sodium use:  rarely  Special dietary habits:  None    Activity  Patient is active 3 times per week for  50 or more minutes with Nordic skiing in the winter months and biking in the other seasons.  He bikes 5 times/week for 1-3 hours during the warmer weather.      Laboratory Results Review  We discussed laboratory results today including lipids targets and how foods influence cholesterol.    Weight  His perceived healthy weight is 200 pounds.  A normal BMI of 25 is equal to 184 pounds.  The current BMI of 30.65 is high-risk range.  A weight reduction speed of 2-3 lbs per month for men is recommended.    PMH   Past Medical History:   Diagnosis Date     Atrial fibrillation (H)      Hypertension      Prostate cancer (H) 2018       PSH  Past Surgical History:   Procedure Laterality Date     APPENDECTOMY       [place] TOTAL KNEE REPLACEMENT  2018    right     PROSTATECTOMY, LYMPHADENECTOMY  2018     SHOULDER SURGERY  1985    left     TONSILLECTOMY         Current Meds   No current outpatient medications on file.       Allergies    Allergies not on file    Family Hx   Family History   Problem Relation Age of Onset     Leukemia Mother      Pulmonary fibrosis Father      Hypertension Brother      Hypertension Brother      Hyperlipidemia Brother        Social History  Rogers is retired in 2015.  He worked as an  at [employer] and is retired. His life is semi-active.  He is  with 3 adopted children.     Enjoyment of life is 8 with 10 enjoys life fully.    Tobacco History  History   Smoking Status     Former Smoker     Packs/day: 0.50     Years: 3.00     Types: Cigarettes   Smokeless Tobacco     Not on file     Comment: Smoked during college       ROS  CONSTITUTIONAL:  No fever, chills, or sweats. No weight gain/loss.   EENT:  No visual disturbance, ear ache, epistaxis, sore throat  ALLERGIES/IMMUNOLOGIC:  Negative  RESPIRATORY:  No cough, hemoptysis  CARDIOVASCULAR:  As per HPI  GI:  No nausea, vomiting, hematemesis, melena  :  No urinary frequency, dysuria, or hematuria  INTEGUMENT:  Negative  PSYCHIATRIC:   Negative  NEURO:  Negative  ENDOCRINE:  Negative  MUSCULOSKELETAL:  Negative     Vital Signs   Pulse 64   Ht 1.829 m (6')   Wt 102.5 kg (226 lb)   SpO2 98%   BMI 30.65 kg/m         Waist: 42.5 inches  Hip: 42 inches    Physical Exam   In general, WDWN elderly man in NAD, affect pleasant. Gain steady.  HEENT: NC/AT.  PERRLA.  EOMI.  Sclerae white, not injected.  Nares clear.  Pharynx without erythema or exudate.  Dentition intact.    Neck: No adenopathy.  No thyromegaly.Carotids +4/4 bilaterally without bruits.  No jugular venous distension.   Lungs: Breath sounds clear anthony, no crackles, wheezing, or rhonchi noted.  Cor: Irregular, irregular heart rate.  S1, S2. No murmur, rub, click, or gallop. The PMI is in the 5th ICS in the midclavicular line.  Abdomen: Soft, nontender, nondistended. BS present in all 4 quadrants.  No hepatomegaly.  No abdominal aorta or renal artery bruits noted.  Extremities: No clubbing, cyanosis, or edema. The DP and PT pulses are +2/2 anthony.   Recent Labs  Lab Results   Component Value Date    GLC 92 01/27/2020      Lab Results   Component Value Date    NTBNP 1,325 (H) 01/27/2020     No results found for: NTBNPI   Lab Results   Component Value Date    UCRR 135 01/27/2020      Lab Results   Component Value Date    MICROL 35 01/27/2020      No results found for: MICROALBUMIN   No results found for: CRP   Lab Results   Component Value Date    CHOL 176 01/27/2020      Lab Results   Component Value Date    TRIG 156 (H) 01/27/2020      Lab Results   Component Value Date    HDL 44 01/27/2020      Lab Results   Component Value Date     (H) 01/27/2020      No results found for: VLDL   No results found for: CHOLHDLRATIO  Lab Results   Component Value Date    NHDL 133 (H) 01/27/2020          Reyna Test Results    BASIC SPIROMETRY: Summary of two attempts (see printout for details of results)  Results Estimated range for ht/age   FVC: 3.88 liter FVC: 3.89-5.86 liter   FEV1: 3.37 liter FEV1:  2.79-4.46 liter     History of asthma:  NO   History of respiratory infection current/recent:  NO    Spirometry Results:  normal      WALKING BLOOD PRESSURE RESPONSE (3 minute, 5 MET level walk)   Pre BP: 118/70 mmHg  3 min BP: 128/66 mmHg  1 min post BP:120/77 mmHg    Pre HR: 92 bpm  3 min HR: 110 bpm  1 min post HR: 88 bpm       RETINAL VASCULAR ASSESSMENT   Left Eye Abnormality:  none  AV Ratio: 0.88    Right Eye Abnormality:  none  AV Ratio: 0.82     Retinal Assessment:  normal      ABDOMINAL AORTA ULTRASOUND (< 2.5 normal, borderline 2.5-2.9, abnormal > 3)   SupraIliac 1.93 cm    SupraRenal 2.25 cm    InfraRenal Proximal 1.90 cm    InfraRenal Distal 1.95 cm      Abdominal Aorta Assessment:  normal      LEFT VENTRICULAR ULTRASOUND MEASUREMENTS (adjusted for BSA)  LVIDD 42.8 mm   Septa 10.3mm   Posterior 9.7 mm     Left Ventricular US Assessment:  normal      Carotid Artery IMT measurements report and plaques in the small area examined:   Left IMT 0.612 mm  Plaques none    Right IMT 0.690 mm  Plaques small heterogeneous plaque in right bulb area size 1.2 mm.        ECG (see tracing):  normal sinus rhythm;  rate: 81 bpm      Arterial Elasticity per age and gender (see printout):   C1 13.6 mL/mmHg x 10  normal   C2 4.4 mL/mmHg x 100 normal   Supine blood pressure: 142/101 mmHg     Echocardiogram 30 January 2018:  1. Normal left ventricular size and systolic performance with ejection fraction of 60-65%.  2. No significant valvular heart disease.  3. Normal right ventricular size and systolic performance.  4. Moderate left atrial enlargement. Mild right atrial enlargement.    Holter monitor 6 April 2018:  1. Abnormal Holter monitor tracing by virtue of the persistent atrial fibrillation demonstrated throughout the recording interval.  2. The ventricular response appears to be well controlled.  3. No symptomatic recordings therefore correlation with patient's clinical events cannot be made.  4. No significant  ventricular arrhythmia  5. No profound bradycardia or pauses      Assessment:      1. Cardiovascular:  Asymptomatic, he is not complaining of chest pain. ECG results atrial fibrillation, HR 81 bpm.  He is on Eliquis 5 mg BID.  CHADS2-Vasc score is 2. 2.9% annual embolic risk.      2. Blood Pressure:  Blood pressure in clinic today, /89.   He is taking 50 mg of atenolol.   Standing blood pressure is 118/70.     3. Lipids:Lipids:    HDL  44  Trig  156  He does not take a statin medicaiton.    4. Glucose:  Fasting glucose 92       5. Exercise routine:  He is active Nordic skiing in the winter and biking in the summer months.      6. Nutrition.  He eats a healthy diet but does not eat many vegetables and fruit on a daily basis.       7.  Consider ordering a coronary artery calcium score to further assess his cardiac risk.      8.  Consider a sleep study if risk factors are present.     7.  Return to [facility] CV Prevention clinic in 1-2  years.    Health Habit Summary:  Nutrition: Heart Healthy Eating:  most of the time   Exercise:  regularly active  Weight:  high-risk range  Tobacco Use:  past tobacco use; 2 pack year history    Full report to follow prevention team review of test results with scanned final report.    Time spent for patient visit was 60 minutes with more than half the time spent on counseling and coordination of care.    TREVA Robison CNP     CC  Patient Care Team:  Vivi Miguel MD as PCP - General (Family Practice)  SELF, REFERRED  Kaiser Joseph MD Cardiology

## 2020-01-28 LAB — INTERPRETATION ECG - MUSE: NORMAL

## 2020-02-12 ENCOUNTER — COMMUNICATION - HEALTHEAST (OUTPATIENT)
Dept: FAMILY MEDICINE | Facility: CLINIC | Age: 70
End: 2020-02-12

## 2020-02-12 DIAGNOSIS — E78.5 HYPERLIPIDEMIA LDL GOAL <100: ICD-10-CM

## 2020-02-17 ENCOUNTER — COMMUNICATION - HEALTHEAST (OUTPATIENT)
Dept: FAMILY MEDICINE | Facility: CLINIC | Age: 70
End: 2020-02-17

## 2020-02-24 ENCOUNTER — HEALTH MAINTENANCE LETTER (OUTPATIENT)
Age: 70
End: 2020-02-24

## 2020-04-07 ENCOUNTER — COMMUNICATION - HEALTHEAST (OUTPATIENT)
Dept: FAMILY MEDICINE | Facility: CLINIC | Age: 70
End: 2020-04-07

## 2020-04-07 DIAGNOSIS — I48.0 PAROXYSMAL ATRIAL FIBRILLATION (H): ICD-10-CM

## 2020-06-05 ENCOUNTER — RECORDS - HEALTHEAST (OUTPATIENT)
Dept: ADMINISTRATIVE | Facility: OTHER | Age: 70
End: 2020-06-05

## 2020-06-22 DIAGNOSIS — I10 BENIGN ESSENTIAL HYPERTENSION: ICD-10-CM

## 2020-06-22 DIAGNOSIS — E78.5 HYPERLIPIDEMIA LDL GOAL <100: Primary | ICD-10-CM

## 2020-06-22 RX ORDER — ATORVASTATIN CALCIUM 10 MG/1
10 TABLET, FILM COATED ORAL DAILY
Qty: 90 TABLET | Refills: 0 | Status: SHIPPED | OUTPATIENT
Start: 2020-06-22 | End: 2021-12-20

## 2020-07-08 ENCOUNTER — RECORDS - HEALTHEAST (OUTPATIENT)
Dept: ADMINISTRATIVE | Facility: OTHER | Age: 70
End: 2020-07-08

## 2020-07-15 ENCOUNTER — COMMUNICATION - HEALTHEAST (OUTPATIENT)
Dept: FAMILY MEDICINE | Facility: CLINIC | Age: 70
End: 2020-07-15

## 2020-07-15 DIAGNOSIS — I48.0 PAROXYSMAL ATRIAL FIBRILLATION (H): ICD-10-CM

## 2020-07-17 ENCOUNTER — RECORDS - HEALTHEAST (OUTPATIENT)
Dept: ADMINISTRATIVE | Facility: OTHER | Age: 70
End: 2020-07-17

## 2020-08-09 ENCOUNTER — COMMUNICATION - HEALTHEAST (OUTPATIENT)
Dept: FAMILY MEDICINE | Facility: CLINIC | Age: 70
End: 2020-08-09

## 2020-08-09 DIAGNOSIS — G25.81 RESTLESS LEGS SYNDROME (RLS): ICD-10-CM

## 2020-08-16 ENCOUNTER — COMMUNICATION - HEALTHEAST (OUTPATIENT)
Dept: FAMILY MEDICINE | Facility: CLINIC | Age: 70
End: 2020-08-16

## 2020-08-16 DIAGNOSIS — I48.0 PAROXYSMAL ATRIAL FIBRILLATION (H): ICD-10-CM

## 2020-08-20 ENCOUNTER — RECORDS - HEALTHEAST (OUTPATIENT)
Dept: ADMINISTRATIVE | Facility: OTHER | Age: 70
End: 2020-08-20

## 2020-09-10 ENCOUNTER — COMMUNICATION - HEALTHEAST (OUTPATIENT)
Dept: FAMILY MEDICINE | Facility: CLINIC | Age: 70
End: 2020-09-10

## 2020-09-18 ENCOUNTER — OFFICE VISIT - HEALTHEAST (OUTPATIENT)
Dept: FAMILY MEDICINE | Facility: CLINIC | Age: 70
End: 2020-09-18

## 2020-09-18 DIAGNOSIS — Z12.5 SCREENING FOR MALIGNANT NEOPLASM OF PROSTATE: ICD-10-CM

## 2020-09-18 DIAGNOSIS — I10 ESSENTIAL HYPERTENSION: ICD-10-CM

## 2020-09-18 DIAGNOSIS — E78.5 HYPERLIPIDEMIA LDL GOAL <100: ICD-10-CM

## 2020-09-18 DIAGNOSIS — I48.0 PAROXYSMAL ATRIAL FIBRILLATION (H): ICD-10-CM

## 2020-09-18 DIAGNOSIS — Z00.00 ROUTINE GENERAL MEDICAL EXAMINATION AT A HEALTH CARE FACILITY: ICD-10-CM

## 2020-09-18 DIAGNOSIS — Z13.220 ENCOUNTER FOR SCREENING FOR LIPOID DISORDERS: ICD-10-CM

## 2020-09-18 LAB
ALBUMIN SERPL-MCNC: 4.3 G/DL (ref 3.5–5)
ALP SERPL-CCNC: 110 U/L (ref 45–120)
ALT SERPL W P-5'-P-CCNC: 28 U/L (ref 0–45)
ANION GAP SERPL CALCULATED.3IONS-SCNC: 8 MMOL/L (ref 5–18)
AST SERPL W P-5'-P-CCNC: 31 U/L (ref 0–40)
BILIRUB SERPL-MCNC: 0.8 MG/DL (ref 0–1)
BUN SERPL-MCNC: 15 MG/DL (ref 8–22)
CALCIUM SERPL-MCNC: 9.8 MG/DL (ref 8.5–10.5)
CHLORIDE BLD-SCNC: 104 MMOL/L (ref 98–107)
CHOLEST SERPL-MCNC: 139 MG/DL
CO2 SERPL-SCNC: 27 MMOL/L (ref 22–31)
CREAT SERPL-MCNC: 1.01 MG/DL (ref 0.7–1.3)
FASTING STATUS PATIENT QL REPORTED: YES
GFR SERPL CREATININE-BSD FRML MDRD: >60 ML/MIN/1.73M2
GLUCOSE BLD-MCNC: 102 MG/DL (ref 70–125)
HDLC SERPL-MCNC: 46 MG/DL
LDLC SERPL CALC-MCNC: 77 MG/DL
POTASSIUM BLD-SCNC: 5.3 MMOL/L (ref 3.5–5)
PROT SERPL-MCNC: 6.8 G/DL (ref 6–8)
PSA SERPL-MCNC: <0.1 NG/ML (ref 0–4.5)
SODIUM SERPL-SCNC: 139 MMOL/L (ref 136–145)
TRIGL SERPL-MCNC: 82 MG/DL

## 2020-09-18 ASSESSMENT — MIFFLIN-ST. JEOR: SCORE: 1859.21

## 2020-09-21 LAB
25(OH)D3 SERPL-MCNC: 68.2 NG/ML (ref 30–80)
25(OH)D3 SERPL-MCNC: 68.2 NG/ML (ref 30–80)

## 2020-10-14 ENCOUNTER — COMMUNICATION - HEALTHEAST (OUTPATIENT)
Dept: FAMILY MEDICINE | Facility: CLINIC | Age: 70
End: 2020-10-14

## 2020-10-14 DIAGNOSIS — I48.0 PAROXYSMAL ATRIAL FIBRILLATION (H): ICD-10-CM

## 2020-10-25 ENCOUNTER — COMMUNICATION - HEALTHEAST (OUTPATIENT)
Dept: FAMILY MEDICINE | Facility: CLINIC | Age: 70
End: 2020-10-25

## 2020-10-25 DIAGNOSIS — I10 ESSENTIAL HYPERTENSION: ICD-10-CM

## 2020-11-09 ENCOUNTER — COMMUNICATION - HEALTHEAST (OUTPATIENT)
Dept: FAMILY MEDICINE | Facility: CLINIC | Age: 70
End: 2020-11-09

## 2020-11-09 DIAGNOSIS — G25.81 RESTLESS LEGS SYNDROME (RLS): ICD-10-CM

## 2020-11-18 ENCOUNTER — COMMUNICATION - HEALTHEAST (OUTPATIENT)
Dept: FAMILY MEDICINE | Facility: CLINIC | Age: 70
End: 2020-11-18

## 2020-12-07 ENCOUNTER — COMMUNICATION - HEALTHEAST (OUTPATIENT)
Dept: CARDIOLOGY | Facility: CLINIC | Age: 70
End: 2020-12-07

## 2020-12-07 ENCOUNTER — COMMUNICATION - HEALTHEAST (OUTPATIENT)
Dept: FAMILY MEDICINE | Facility: CLINIC | Age: 70
End: 2020-12-07

## 2020-12-09 ENCOUNTER — COMMUNICATION - HEALTHEAST (OUTPATIENT)
Dept: FAMILY MEDICINE | Facility: CLINIC | Age: 70
End: 2020-12-09

## 2020-12-09 DIAGNOSIS — I10 ESSENTIAL HYPERTENSION: ICD-10-CM

## 2020-12-13 ENCOUNTER — HEALTH MAINTENANCE LETTER (OUTPATIENT)
Age: 70
End: 2020-12-13

## 2021-01-11 ENCOUNTER — COMMUNICATION - HEALTHEAST (OUTPATIENT)
Dept: FAMILY MEDICINE | Facility: CLINIC | Age: 71
End: 2021-01-11

## 2021-01-11 DIAGNOSIS — I48.0 PAROXYSMAL ATRIAL FIBRILLATION (H): ICD-10-CM

## 2021-01-15 ENCOUNTER — COMMUNICATION - HEALTHEAST (OUTPATIENT)
Dept: FAMILY MEDICINE | Facility: CLINIC | Age: 71
End: 2021-01-15

## 2021-02-01 ENCOUNTER — COMMUNICATION - HEALTHEAST (OUTPATIENT)
Dept: FAMILY MEDICINE | Facility: CLINIC | Age: 71
End: 2021-02-01

## 2021-02-15 ENCOUNTER — COMMUNICATION - HEALTHEAST (OUTPATIENT)
Dept: FAMILY MEDICINE | Facility: CLINIC | Age: 71
End: 2021-02-15

## 2021-02-15 DIAGNOSIS — E78.5 HYPERLIPIDEMIA LDL GOAL <100: ICD-10-CM

## 2021-03-11 ENCOUNTER — COMMUNICATION - HEALTHEAST (OUTPATIENT)
Dept: FAMILY MEDICINE | Facility: CLINIC | Age: 71
End: 2021-03-11

## 2021-03-15 ENCOUNTER — COMMUNICATION - HEALTHEAST (OUTPATIENT)
Dept: FAMILY MEDICINE | Facility: CLINIC | Age: 71
End: 2021-03-15

## 2021-03-25 ENCOUNTER — OFFICE VISIT - HEALTHEAST (OUTPATIENT)
Dept: CARDIOLOGY | Facility: CLINIC | Age: 71
End: 2021-03-25

## 2021-03-25 DIAGNOSIS — I48.0 PAROXYSMAL ATRIAL FIBRILLATION (H): ICD-10-CM

## 2021-03-25 DIAGNOSIS — I10 HYPERTENSION, UNSPECIFIED TYPE: ICD-10-CM

## 2021-03-29 ENCOUNTER — COMMUNICATION - HEALTHEAST (OUTPATIENT)
Dept: CARDIOLOGY | Facility: CLINIC | Age: 71
End: 2021-03-29

## 2021-03-30 ENCOUNTER — TRANSFERRED RECORDS (OUTPATIENT)
Dept: HEALTH INFORMATION MANAGEMENT | Facility: CLINIC | Age: 71
End: 2021-03-30

## 2021-03-30 ENCOUNTER — COMMUNICATION - HEALTHEAST (OUTPATIENT)
Dept: CARDIOLOGY | Facility: CLINIC | Age: 71
End: 2021-03-30

## 2021-03-30 DIAGNOSIS — I10 ESSENTIAL HYPERTENSION: ICD-10-CM

## 2021-04-17 ENCOUNTER — HEALTH MAINTENANCE LETTER (OUTPATIENT)
Age: 71
End: 2021-04-17

## 2021-04-19 ENCOUNTER — COMMUNICATION - HEALTHEAST (OUTPATIENT)
Dept: FAMILY MEDICINE | Facility: CLINIC | Age: 71
End: 2021-04-19

## 2021-04-19 DIAGNOSIS — I10 ESSENTIAL HYPERTENSION: ICD-10-CM

## 2021-04-19 DIAGNOSIS — I48.0 PAROXYSMAL ATRIAL FIBRILLATION (H): ICD-10-CM

## 2021-05-16 ENCOUNTER — COMMUNICATION - HEALTHEAST (OUTPATIENT)
Dept: FAMILY MEDICINE | Facility: CLINIC | Age: 71
End: 2021-05-16

## 2021-05-16 DIAGNOSIS — G25.81 RESTLESS LEGS SYNDROME (RLS): ICD-10-CM

## 2021-05-26 VITALS
SYSTOLIC BLOOD PRESSURE: 138 MMHG | RESPIRATION RATE: 14 BRPM | HEART RATE: 102 BPM | TEMPERATURE: 97.5 F | DIASTOLIC BLOOD PRESSURE: 105 MMHG

## 2021-05-26 VITALS
DIASTOLIC BLOOD PRESSURE: 110 MMHG | HEART RATE: 82 BPM | TEMPERATURE: 97.3 F | SYSTOLIC BLOOD PRESSURE: 156 MMHG | RESPIRATION RATE: 14 BRPM

## 2021-05-27 ENCOUNTER — RECORDS - HEALTHEAST (OUTPATIENT)
Dept: ADMINISTRATIVE | Facility: CLINIC | Age: 71
End: 2021-05-27

## 2021-05-29 NOTE — TELEPHONE ENCOUNTER
RN cannot approve Refill Request    RN can NOT refill this medication Protocol failed and NO refill given      Sujatha Cole, [facility] Triage/Med Refill 6/10/2019    Requested Prescriptions   Pending Prescriptions Disp Refills     metoprolol tartrate (LOPRESSOR) 100 MG tablet [Pharmacy Med Name: METOPROLOL TART 100MG TAB] 180 tablet 3     Sig: TAKE 1 TABLET BY MOUTH TWICE DAILY       Beta-Blockers Refill Protocol Failed - 6/8/2019  8:35 PM        Failed - PCP or prescribing provider visit in past 12 months or next 3 months     Last office visit with prescriber/PCP: 5/11/2018 Vivi Miguel MD OR same dept: Visit date not found OR same specialty: 5/11/2018 Vivi Miguel MD  Last physical: 4/16/2018 Last MTM visit: Visit date not found   Next visit within 3 mo: Visit date not found  Next physical within 3 mo: Visit date not found  Prescriber OR PCP: Vivi Miguel MD  Last diagnosis associated with med order: 1. Paroxysmal atrial fibrillation (H)  - metoprolol tartrate (LOPRESSOR) 100 MG tablet [Pharmacy Med Name: METOPROLOL TART 100MG TAB]; TAKE 1 TABLET BY MOUTH TWICE DAILY  Dispense: 180 tablet; Refill: 3    2. Restless Legs Syndrome  - pramipexole (MIRAPEX) 0.5 MG tablet [Pharmacy Med Name: PRAMIPEXOLE 0.5MG   TAB]; TAKE 1 TABLET BY MOUTH ONCE DAILY AT BEDTIME  Dispense: 90 tablet; Refill: 2    If protocol passes may refill for 12 months if within 3 months of last provider visit (or a total of 15 months).             Passed - Blood pressure filed in past 12 months     BP Readings from Last 1 Encounters:   10/25/18 106/82             pramipexole (MIRAPEX) 0.5 MG tablet [Pharmacy Med Name: PRAMIPEXOLE 0.5MG   TAB] 90 tablet 2     Sig: TAKE 1 TABLET BY MOUTH ONCE DAILY AT BEDTIME       Parkinson's Meds I Refill Protocol Failed - 6/8/2019  8:35 PM        Failed - PCP or prescribing provider visit in past 6 months      Last office visit with prescriber/PCP: Visit date not found OR same  dept: Visit date not found OR same specialty: 5/11/2018 Vivi Miguel MD Last physical: Visit date not found Last MTM visit: Visit date not found     Next appt within 3 mo: Visit date not found  Next physical within 3 mo: Visit date not found  Prescriber OR PCP: Vivi Miguel MD  Last diagnosis associated with med order: 1. Paroxysmal atrial fibrillation (H)  - metoprolol tartrate (LOPRESSOR) 100 MG tablet [Pharmacy Med Name: METOPROLOL TART 100MG TAB]; TAKE 1 TABLET BY MOUTH TWICE DAILY  Dispense: 180 tablet; Refill: 3    2. Restless Legs Syndrome  - pramipexole (MIRAPEX) 0.5 MG tablet [Pharmacy Med Name: PRAMIPEXOLE 0.5MG   TAB]; TAKE 1 TABLET BY MOUTH ONCE DAILY AT BEDTIME  Dispense: 90 tablet; Refill: 2    If protocol passes may refill for 6 months if within 3 months of last provider visit (or a total of 9 months).

## 2021-05-29 NOTE — PROGRESS NOTES
Assessment and Plan:   Fasting labs pending  Immunizations updated - Tdap today - discussed Shingles vaccine and he will call insurance  Healthy lifestyle modifications discussed  Discussed self testicular exams  Colonoscopy UTD  PSA discussed and will be done at urology office next month  The following high BMI interventions were performed this visit: encouragement to exercise and weight monitoring        1. Routine general medical examination at a health care facility  - Lipid Banks FASTING  - Tdap vaccine,  8yo or older,  IM     2. Restless Legs Syndrome  Increase to 0.75mg at night if needed  - pramipexole (MIRAPEX) 0.5 MG tablet; Take 1.5 tablets (0.75 mg total) by mouth at bedtime.  Dispense: 135 tablet; Refill: 3     3. Paroxysmal atrial fibrillation (H)  Continue Eliquis.  Continue cardiology follow-up.     4. Prostate cancer (H)  Continue urology follow-up.     5. Essential hypertension  Blood pressure was slightly above goal today but was normal at cardiologist office a few months ago.  He will continue to monitor at home and contact me if it is consistently over 140/90  - Basic Metabolic Panel      The patient's current medical problems were reviewed.    I have had an Advance Directives discussion with the patient.  The following health maintenance schedule was reviewed with the patient and provided in printed form in the after visit summary:   Health Maintenance   Topic Date Due     ADVANCE DIRECTIVES DISCUSSED WITH PATIENT  10/04/2010     ZOSTER VACCINES (2 of 3) 01/13/2016     FALL RISK ASSESSMENT  07/14/2017     TD 18+ HE  05/09/2019     INFLUENZA VACCINE RULE BASED (Season Ended) 08/01/2019     COLONOSCOPY  07/14/2024     PNEUMOCOCCAL POLYSACCHARIDE VACCINE AGE 65 AND OVER  Completed     PNEUMOCOCCAL CONJUGATE VACCINE FOR ADULTS (PCV13 OR PREVNAR)  Completed        Subjective:   Chief Complaint: Rogers Huffman is an 68 y.o. male here for an Annual Wellness visit.        No concerns.  He is  overall doing well.  He has a past medical history significant for paroxysmal atrial fibrillation for which she sees cardiology and takes metoprolol.  He is currently taking Eliquis as well and doing well with the medication.     He has a history of prostate cancer status post TURP procedure about a year and a half ago and is been doing fairly well since then.  He does have some urinary leakage as well as some erectile dysfunction issues which he is working with urologist.  He is seeing them again next month.     He travels to [place] for several weeks in the winter.  He has an Ebike which he enjoys.        Review of Systems: Please see above.  The rest of the review of systems are negative for all systems.    Patient Care Team:  Vivi Miguel MD as PCP - General (Family Medicine)  Joseph Mccurdy MD as Physician (Urology)     Patient Active Problem List   Diagnosis     Essential hypertension     Abnormal Blood Chemistry     Cellulitis     Abdominal Pain In The Right Lower Belly (RLQ)     Tinea Cruris     Restless Legs Syndrome     Rotator Cuff Tendonitis     Paroxysmal atrial fibrillation (H)     Prostate cancer (H)     Tachycardia     S/P total knee arthroplasty     Past Medical History:   Diagnosis Date     Arthritis      Atrial fibrillation (H)      Hypertension      Prostate cancer (H)      Restless legs       Past Surgical History:   Procedure Laterality Date     ANTERIOR CRUCIATE LIGAMENT REPAIR Right      APPENDECTOMY  1969     HARDWARE REMOVAL Right 5/3/2018    Procedure: HARDWARE REMOVAL RIGHT KNEE, LATERAL RELEASE;  Surgeon: Twan Yin MD;  Location: [facility] OR;  Service:      HI LAP,PROSTATECTOMY,RADICAL,W/NERVE SPARE,INCL ROBOTIC Bilateral 2/6/2018    Procedure: ROBOTIC ASSISTED RADICAL RETROPUBIC PROSTATECTOMY BILATERAL PELVIC LYMPH NODE DISSECTION LYSIS OF ADHESIONS;  Surgeon: Wm Koo MD;  Location: [facility] OR;  Service: Urology     HI TOTAL KNEE  ARTHROPLASTY Right 5/3/2018    Procedure: RIGHT TOTAL KNEE ARTHROPLASTY, COMPUTER-ASSIST;  Surgeon: Twan Yin MD;  Location: [facility] Main OR;  Service: Orthopedics     SHOULDER SURGERY Left      TONSILLECTOMY        Family History   Problem Relation Age of Onset     Leukemia Mother      Hypertension Father      Cancer Maternal Aunt         bone cancer      Social History     Socioeconomic History     Marital status:      Spouse name: Not on file     Number of children: Not on file     Years of education: Not on file     Highest education level: Not on file   Occupational History     Not on file   Social Needs     Financial resource strain: Not on file     Food insecurity:     Worry: Not on file     Inability: Not on file     Transportation needs:     Medical: Not on file     Non-medical: Not on file   Tobacco Use     Smoking status: Former Smoker     Last attempt to quit: 1975     Years since quittin.4     Smokeless tobacco: Never Used   Substance and Sexual Activity     Alcohol use: Yes     Alcohol/week: 0.6 oz     Types: 1 Glasses of wine per week     Drug use: No     Sexual activity: Not on file   Lifestyle     Physical activity:     Days per week: Not on file     Minutes per session: Not on file     Stress: Not on file   Relationships     Social connections:     Talks on phone: Not on file     Gets together: Not on file     Attends [employer] service: Not on file     Active member of club or organization: Not on file     Attends meetings of clubs or organizations: Not on file     Relationship status: Not on file     Intimate partner violence:     Fear of current or ex partner: Not on file     Emotionally abused: Not on file     Physically abused: Not on file     Forced sexual activity: Not on file   Other Topics Concern     Not on file   Social History Narrative     Not on file      Current Outpatient Medications   Medication Sig Dispense Refill     apixaban (ELIQUIS) 5 mg Tab tablet Take 1  "tablet (5 mg total) by mouth 2 (two) times a day. 180 tablet 1     DOCOSAHEXANOIC ACID/EPA (FISH OIL ORAL) Take 600 mg by mouth daily.        metoprolol tartrate (LOPRESSOR) 100 MG tablet TAKE 1 TABLET BY MOUTH TWICE DAILY 180 tablet 0     multivitamin therapeutic tablet Take 1 tablet by mouth daily.       pramipexole (MIRAPEX) 0.5 MG tablet Take 1.5 tablets (0.75 mg total) by mouth at bedtime. 135 tablet 3     sildenafil (VIAGRA) 50 MG tablet Take 100 mg by mouth every other day.        No current facility-administered medications for this visit.       Objective:   Vital Signs:   Visit Vitals  /84   Pulse 68   Temp 97.8  F (36.6  C) (Oral)   Resp 12   Ht 5' 11.26\" (1.81 m)   Wt (!) 234 lb 3 oz (106.2 kg)   BMI 32.42 kg/m         VisionScreening:  No exam data present     PHYSICAL EXAM  Physical Exam:  General Appearance: Alert, cooperative, no distress, appears stated age  Head: Normocephalic, without obvious abnormality, atraumatic  Eyes: PERRL, conjunctiva/corneas clear, EOM's intact  Ears: Normal TM's and external ear canals, both ears   Nose:Nares normal, septum midline,mucosa normal, no drainage   Throat:Lips, mucosa, and tongue normal; teeth and gums normal  Neck: Supple, symmetrical, trachea midline, no adenopathy;  thyroid: not enlarged, symmetric, no tenderness/mass/nodules  Back: Symmetric, no curvature, ROM normal, no CVA tenderness   Lungs: Clear to auscultation bilaterally, respirations unlabored  Chest: no visible abnormalities.  Heart: Regular rate and rhythm, S1 and S2 normal, no murmur, rub, or gallop,   Abdomen: Soft, non-tender, bowel sounds active all four quadrants,  no masses, no organomegaly  :deferred - see cardiology  Extremities: Extremities normal, atraumatic, no cyanosis or edema  Skin: Skin color, texture, turgor normal, no rashes or lesions  Lymph nodes: Cervical, supraclavicular, and axillary nodes normal  Neurologic: Normal       Assessment Results 6/19/2019   Activities of " Daily Living No help needed   Instrumental Activities of Daily Living No help needed   Mini Cog Total Score 5   Some recent data might be hidden     A Mini-Cog score of 0-2 suggests the possibility of dementia, score of 3-5 suggests no dementia    Identified Health Risks:     Information on urinary incontinence and treatment options given to patient.  Patient's advanced directive was discussed and I am comfortable with the patient's wishes.

## 2021-05-29 NOTE — TELEPHONE ENCOUNTER
Dr. Joseph  [facility] is requesting patient hold Eliquis.   May he hold Eliquis for 3 days prior to colonoscopy?  Thank you,  Anna

## 2021-05-29 NOTE — TELEPHONE ENCOUNTER
----- Message from Rito Carlin sent at 6/21/2019  9:13 AM CDT -----  Contact: LIVIA BURNETTE  General phone call:    Caller: LIVIA BURNETTE     Primary cardiologist: Dr Joseph    Detailed reason for call: upcoming colonoscopy - need hold/bridge for ELIQUIS   [phone]  Opt 1 opt 1

## 2021-05-29 NOTE — TELEPHONE ENCOUNTER
Please help this patient make an appointment and route back to me for limited refill    Thanks!    BB

## 2021-05-30 NOTE — TELEPHONE ENCOUNTER
RN cannot approve Refill Request    RN can NOT refill this medication med is not covered by policy/route to provider. Last office visit: 5/11/2018 Vivi Miguel MD Last Physical: 6/19/2019 Last MTM visit: Visit date not found Last visit same specialty: 5/11/2018 Vivi iMguel MD.  Next visit within 3 mo: Visit date not found  Next physical within 3 mo: Visit date not found      Surya Garcia, [facility] Connection Triage/Med Refill 7/13/2019    Requested Prescriptions   Pending Prescriptions Disp Refills     ELIQUIS 5 mg Tab tablet [Pharmacy Med Name: ELIQUIS 5MG         TAB] 180 tablet 1     Sig: TAKE 1 TABLET BY MOUTH TWICE DAILY       Apixaban/Rivaroxaban/Dabigatran Refill Protocol Failed - 7/12/2019 11:15 PM        Failed - Route to appropriate pool/provider     Last Anticoagulation Summary:           Passed - Renal function test in last year     Creatinine   Date Value Ref Range Status   06/19/2019 1.05 0.70 - 1.30 mg/dL Final             Passed - PCP or prescribing provider visit in past 12 months       Last office visit with prescriber/PCP: 5/11/2018 Vivi Miguel MD OR same dept: Visit date not found OR same specialty: 5/11/2018 Vivi Miguel MD  Last physical: 6/19/2019 Last MTM visit: Visit date not found   Next visit within 3 mo: Visit date not found  Next physical within 3 mo: Visit date not found  Prescriber OR PCP: Vivi Miguel MD  Last diagnosis associated with med order: 1. Paroxysmal atrial fibrillation (H)  - ELIQUIS 5 mg Tab tablet [Pharmacy Med Name: ELIQUIS 5MG         TAB]; TAKE 1 TABLET BY MOUTH TWICE DAILY  Dispense: 180 tablet; Refill: 1    If protocol passes may refill for 12 months if within 3 months of last provider visit (or a total of 15 months).

## 2021-05-31 VITALS — WEIGHT: 224 LBS | BODY MASS INDEX: 31.36 KG/M2 | HEIGHT: 71 IN

## 2021-05-31 VITALS — BODY MASS INDEX: 31.64 KG/M2 | WEIGHT: 226 LBS | HEIGHT: 71 IN

## 2021-05-31 VITALS — BODY MASS INDEX: 31.36 KG/M2 | WEIGHT: 224 LBS | HEIGHT: 71 IN

## 2021-05-31 VITALS — HEIGHT: 72 IN | BODY MASS INDEX: 30.42 KG/M2 | WEIGHT: 224.6 LBS

## 2021-05-31 VITALS — HEIGHT: 71 IN | BODY MASS INDEX: 30.45 KG/M2 | WEIGHT: 217.5 LBS

## 2021-05-31 NOTE — TELEPHONE ENCOUNTER
Refill Approved    Rx renewed per Medication Renewal Policy. Medication was last renewed on 6/19/2019 with 3 refills. Noted dosage different than what is requested. Message sent to pharmacy.  Last office visit: 6/19/2019 with PCP Dr ALBERTA Miguel. Dosage increased at that visit.     Valerie Barrientos, [facility] Triage/Med Refill 8/17/2019     Requested Prescriptions   Pending Prescriptions Disp Refills     pramipexole (MIRAPEX) 0.5 MG tablet [Pharmacy Med Name: PRAMIPEXOLE 0.5MG   TAB] 90 tablet 0     Sig: TAKE 1 TABLET BY MOUTH ONCE DAILY AT BEDTIME       Parkinson's Meds I Refill Protocol Passed - 8/17/2019  8:34 AM        Passed - PCP or prescribing provider visit in past 6 months      Last office visit with prescriber/PCP: Visit date not found OR same dept: Visit date not found OR same specialty: 5/11/2018 Vivi Miguel MD Last physical: 6/19/2019 Last MTM visit: Visit date not found     Next appt within 3 mo: Visit date not found  Next physical within 3 mo: Visit date not found  Prescriber OR PCP: Vivi Miguel MD  Last diagnosis associated with med order: 1. Restless Legs Syndrome  - pramipexole (MIRAPEX) 0.5 MG tablet [Pharmacy Med Name: PRAMIPEXOLE 0.5MG   TAB]; TAKE 1 TABLET BY MOUTH ONCE DAILY AT BEDTIME  Dispense: 90 tablet; Refill: 0    If protocol passes may refill for 6 months if within 3 months of last provider visit (or a total of 9 months).

## 2021-06-01 VITALS — HEIGHT: 72 IN | BODY MASS INDEX: 30.48 KG/M2 | WEIGHT: 225 LBS

## 2021-06-01 VITALS — WEIGHT: 225 LBS | BODY MASS INDEX: 30.48 KG/M2 | HEIGHT: 72 IN

## 2021-06-01 VITALS — BODY MASS INDEX: 31.7 KG/M2 | WEIGHT: 234.06 LBS | HEIGHT: 72 IN

## 2021-06-01 VITALS — HEIGHT: 72 IN | WEIGHT: 229.38 LBS | BODY MASS INDEX: 31.07 KG/M2

## 2021-06-01 NOTE — TELEPHONE ENCOUNTER
Refill Approved    Rx renewed per Medication Renewal Policy. Medication was last renewed on 6/11/19.    Sujatha Cole, [facility] Triage/Med Refill 9/4/2019     Requested Prescriptions   Pending Prescriptions Disp Refills     metoprolol tartrate (LOPRESSOR) 100 MG tablet [Pharmacy Med Name: METOPROLOL TART 100MG TAB] 180 tablet 0     Sig: TAKE 1 TABLET BY MOUTH TWICE DAILY       Beta-Blockers Refill Protocol Passed - 9/4/2019 11:42 AM        Passed - PCP or prescribing provider visit in past 12 months or next 3 months     Last office visit with prescriber/PCP: 5/11/2018 Vivi Miguel MD OR same dept: Visit date not found OR same specialty: 5/11/2018 Vivi Miguel MD  Last physical: 6/19/2019 Last MTM visit: Visit date not found   Next visit within 3 mo: Visit date not found  Next physical within 3 mo: Visit date not found  Prescriber OR PCP: Vivi Miguel MD  Last diagnosis associated with med order: 1. Paroxysmal atrial fibrillation (H)  - metoprolol tartrate (LOPRESSOR) 100 MG tablet [Pharmacy Med Name: METOPROLOL TART 100MG TAB]; TAKE 1 TABLET BY MOUTH TWICE DAILY  Dispense: 180 tablet; Refill: 0    If protocol passes may refill for 12 months if within 3 months of last provider visit (or a total of 15 months).             Passed - Blood pressure filed in past 12 months     BP Readings from Last 1 Encounters:   06/19/19 130/84

## 2021-06-02 VITALS — WEIGHT: 223 LBS | HEIGHT: 72 IN | BODY MASS INDEX: 30.2 KG/M2

## 2021-06-03 VITALS
HEART RATE: 64 BPM | BODY MASS INDEX: 31.78 KG/M2 | HEIGHT: 71 IN | DIASTOLIC BLOOD PRESSURE: 86 MMHG | WEIGHT: 227 LBS | RESPIRATION RATE: 16 BRPM | SYSTOLIC BLOOD PRESSURE: 130 MMHG

## 2021-06-03 VITALS — BODY MASS INDEX: 32.79 KG/M2 | HEIGHT: 71 IN | WEIGHT: 234.19 LBS

## 2021-06-03 NOTE — TELEPHONE ENCOUNTER
Zestar Study Consent Visit    Study description: ECG and PPG Study: Zestar Study      Rogers Huffman a 69 y.o. male , was contacted by today to discuss participation in the Zestar study.     The patient called the Clinical Trials Office to inquire about study participation.  The consent form was reviewed with the patient.     The review of the study included:    Study purpose     Conflict of interest    Device description      Study visits    Risks of participation    Benefits (if any)    Alternatives    Voluntary participation    Confidentiality     Compensation/costs of participation    Study stipends    Injury and legal rights    The subject was queried in regards to his willingness to continue and come in for scheduled appointment. his questions were answered to his satisfaction.   The patient has given his preliminary agreement to volunteer to participate in the above noted study.     Plan: Rogers Huffman will come to [facility] on 11/18/19 to continue consent process. If he continues to agrees to participate, the study visit will be done on the same day.  he was instructed to eat as usual before coming for study visit and take medications as usual too. he was encouraged to wear comfortable clothes and shoes to the study appointment.       Rosa Tavera RN

## 2021-06-04 VITALS
HEART RATE: 88 BPM | SYSTOLIC BLOOD PRESSURE: 140 MMHG | WEIGHT: 230.4 LBS | BODY MASS INDEX: 32.26 KG/M2 | DIASTOLIC BLOOD PRESSURE: 102 MMHG | HEIGHT: 71 IN

## 2021-06-04 VITALS
HEIGHT: 71 IN | HEART RATE: 85 BPM | BODY MASS INDEX: 32.81 KG/M2 | SYSTOLIC BLOOD PRESSURE: 146 MMHG | RESPIRATION RATE: 16 BRPM | DIASTOLIC BLOOD PRESSURE: 86 MMHG | TEMPERATURE: 96.7 F | WEIGHT: 234.38 LBS

## 2021-06-04 NOTE — TELEPHONE ENCOUNTER
I've requested a refill of Eliquis because I'll be out of the country when the current supply runs out.  [facility] should be contacting you.    Last OV 12/4/19.  Rx queued for MD approval.

## 2021-06-04 NOTE — PROGRESS NOTES
Assessment/Plan:    Rogers Huffman is a 69 y.o. male presenting for:    1. Paroxysmal atrial fibrillation (H)  He has done very well with atenolol in the past and I will send this to the pharmacy for him.  Blood pressure is above goal particularly in the diastolic range.      He is leaving for vacation in [place] next week and will continue his propranolol until he gets back home.  Once he gets back home he will then switch to the atenolol and keep track of his blood pressures and pulse and send me a message with these results.  - atenolol (TENORMIN) 50 MG tablet; Take 1 tablet (50 mg total) by mouth daily.  Dispense: 90 tablet; Refill: 2        There are no discontinued medications.        Chief Complaint:  Chief Complaint   Patient presents with     Blood Pressure       Subjective:   Rogers Huffman is a pleasant 69-year-old gentleman with a past medical history significant for atrial fibrillation and hypertension presenting to the clinic today with concerns over blood pressures.    The patient brings a log of his blood pressures over the last few months.  Blood pressures unfortunately are generally ranging in the 140 systolic  over the low 100s diastolic.  The patient states that he has not really changed anything.  He was switched from atenolol to propranolol about 10 months ago when his atenolol was discontinued/backorder but has been doing okay since then.  Unfortunately, he went to be in a research study and his diastolic blood pressure was in the 100s.  Because of that he could not be in the study.    He has an apple watch that is able to gauge his pulse and his pulse is generally with an adequate control although sometimes in the higher 90s.   He is overall feeling well but is somewhat worried because of his blood pressures and his pulse.  He wonders about getting back on the atenolol as this seemed to work well for him in the past.    He is on Eliquis for anticoagulation purposes.    12 point review of  "systems completed and negative except for what has been described above    Social History     Tobacco Use   Smoking Status Former Smoker     Last attempt to quit: 1975     Years since quittin.9   Smokeless Tobacco Never Used       Current Outpatient Medications   Medication Sig     apixaban (ELIQUIS) 5 mg Tab tablet Take 1 tablet (5 mg total) by mouth 2 (two) times a day.     cholecalciferol, vitamin D3, 5,000 unit Tab Take by mouth.     DOCOSAHEXANOIC ACID/EPA (FISH OIL ORAL) Take 600 mg by mouth daily.      ELIQUIS 5 mg Tab tablet TAKE 1 TABLET BY MOUTH TWICE DAILY     metoprolol tartrate (LOPRESSOR) 100 MG tablet TAKE 1 TABLET BY MOUTH TWICE DAILY     multivitamin therapeutic tablet Take 1 tablet by mouth daily.     pramipexole (MIRAPEX) 0.5 MG tablet Take 1.5 tablets (0.75 mg total) by mouth at bedtime.     sildenafil (VIAGRA) 50 MG tablet Take 100 mg by mouth every other day.      atenolol (TENORMIN) 50 MG tablet Take 1 tablet (50 mg total) by mouth daily.         Objective:  Vitals:    19 1002   BP: (!) 140/102   Pulse: 88   Weight: (!) 230 lb 6.4 oz (104.5 kg)   Height: 5' 11.25\" (1.81 m)       Body mass index is 31.91 kg/m .    Vital signs reviewed and stable  General: No acute distress  Psych: Appropriate affect  HEENT: moist mucous membranes, pupils equal, round, reactive to light and accomodation, posterior oropharynx clear of erythema or exudate, tympanic membranes are pearly grey bilaterally  Lymph: no cervical or supraclavicular lymphadenopathy  Cardiovascular: Irregularly irregular rhythm with a regular rate  Pulmonary: clear to auscultation bilaterally with no wheeze  Abdomen: soft, non tender, non distended with normo-active bowel sounds  Extremities: warm and well perfused with no edema  Skin: warm and dry with no rash         This note has been dictated and transcribed using voice recognition software.   Any errors in transcription are unintentional and inherent to the software.  " no

## 2021-06-05 VITALS
WEIGHT: 235 LBS | SYSTOLIC BLOOD PRESSURE: 142 MMHG | DIASTOLIC BLOOD PRESSURE: 92 MMHG | BODY MASS INDEX: 32.55 KG/M2 | HEART RATE: 92 BPM | RESPIRATION RATE: 20 BRPM

## 2021-06-06 NOTE — TELEPHONE ENCOUNTER
Medication Request  Medication name: statin  Requested Pharmacy: [facility]  Reason for request:Patient went to [facility] for a cardio vascular screening.  They recommended he be on a statin.  Patient states this was also Dr Miguel assessment.  Patient is now willing to start statin.  He is leaving out of town next week so anxious to start this now.  He is asking if he needs to seen in clinic or if this medication can just be ordered? Please advise.    When did you use medication last?:  NA  Patient offered appointment:  Please advise.  Okay to leave a detailed message: yes [phone]

## 2021-06-06 NOTE — TELEPHONE ENCOUNTER
Dr. Miguel-  See request below to start a statin.  Last seen on 12/4/19.  Lab results message states:     Based on your age, race, smoking status, blood-pressure, and cholesterol levels your calculated 10 year risk for a vascular event (heart attack/stroke) is 15-18%.   Generally we will recommend starting a cholesterol medication once that risk is about 10%.     Based on this data I would recommend starting a statin medication.  Please let me know if you would be amendable to this and I can send it to the pharmacy!     Dr Miguel

## 2021-06-07 NOTE — TELEPHONE ENCOUNTER
RN cannot approve Refill Request    RN can NOT refill this medication Protocol failed and NO refill given.        Sujatha Cole, [facility] Triage/Med Refill 4/8/2020    Requested Prescriptions   Pending Prescriptions Disp Refills     ELIQUIS 5 mg Tab tablet [Pharmacy Med Name: Eliquis 5 MG Oral Tablet] 180 tablet 0     Sig: Take 1 tablet by mouth twice daily       Apixaban/Rivaroxaban/Dabigatran Refill Protocol Failed - 4/7/2020  7:47 PM        Failed - Route to appropriate pool/provider     Last Anticoagulation Summary:           Passed - Renal function test in last year     Creatinine   Date Value Ref Range Status   06/19/2019 1.05 0.70 - 1.30 mg/dL Final             Passed - PCP or prescribing provider visit in past 12 months       Last office visit with prescriber/PCP: 12/4/2019 Vivi Miguel MD OR same dept: 12/4/2019 Vivi Miguel MD OR same specialty: 12/4/2019 Vivi Miguel MD  Last physical: 6/19/2019 Last MTM visit: Visit date not found   Next visit within 3 mo: Visit date not found  Next physical within 3 mo: Visit date not found  Prescriber OR PCP: Vivi Miguel MD  Last diagnosis associated with med order: 1. Paroxysmal atrial fibrillation (H)  - ELIQUIS 5 mg Tab tablet [Pharmacy Med Name: Eliquis 5 MG Oral Tablet]; TAKE 1 TABLET BY MOUTH TWICE DAILY  Dispense: 180 tablet; Refill: 0    If protocol passes may refill for 12 months if within 3 months of last provider visit (or a total of 15 months).

## 2021-06-09 NOTE — TELEPHONE ENCOUNTER
RN cannot approve Refill Request    RN can NOT refill this medication med is not covered by policy/route to provider. Last office visit: 12/4/2019 Vivi Miguel MD Last Physical: 6/19/2019 Last MTM visit: Visit date not found Last visit same specialty: 12/4/2019 Vivi Miguel MD.  Next visit within 3 mo: Visit date not found  Next physical within 3 mo: Visit date not found      Thalia Miguel [facility] Triage/Med Refill 7/15/2020    Requested Prescriptions   Pending Prescriptions Disp Refills     ELIQUIS 5 mg Tab tablet [Pharmacy Med Name: Eliquis 5 MG Oral Tablet] 180 tablet 0     Sig: Take 1 tablet by mouth twice daily       Apixaban/Rivaroxaban/Dabigatran Refill Protocol Failed - 7/15/2020  8:49 PM        Failed - Renal function test in last year     Creatinine   Date Value Ref Range Status   06/19/2019 1.05 0.70 - 1.30 mg/dL Final             Failed - Route to appropriate pool/provider     Last Anticoagulation Summary:           Passed - PCP or prescribing provider visit in past 12 months       Last office visit with prescriber/PCP: 12/4/2019 Vivi Miguel MD OR same dept: 12/4/2019 Vivi Miguel MD OR same specialty: 12/4/2019 Vivi Miguel MD  Last physical: 6/19/2019 Last MTM visit: Visit date not found   Next visit within 3 mo: Visit date not found  Next physical within 3 mo: Visit date not found  Prescriber OR PCP: Vivi Miguel MD  Last diagnosis associated with med order: 1. Paroxysmal atrial fibrillation (H)  - ELIQUIS 5 mg Tab tablet [Pharmacy Med Name: Eliquis 5 MG Oral Tablet]; Take 1 tablet by mouth twice daily  Dispense: 180 tablet; Refill: 0    If protocol passes may refill for 12 months if within 3 months of last provider visit (or a total of 15 months).

## 2021-06-10 NOTE — TELEPHONE ENCOUNTER
Refill Approved    Rx renewed per Medication Renewal Policy. Medication was last renewed on 12/4/19.    Sujatha Cole, [facility] Triage/Med Refill 8/17/2020     Requested Prescriptions   Pending Prescriptions Disp Refills     atenoloL (TENORMIN) 50 MG tablet [Pharmacy Med Name: Atenolol 50 MG Oral Tablet] 90 tablet 0     Sig: Take 1 tablet by mouth once daily       Beta-Blockers Refill Protocol Passed - 8/16/2020  7:59 PM        Passed - PCP or prescribing provider visit in past 12 months or next 3 months     Last office visit with prescriber/PCP: 12/4/2019 Vivi Miguel MD OR same dept: 12/4/2019 Vivi Miguel MD OR same specialty: 12/4/2019 Vivi Miguel MD  Last physical: 6/19/2019 Last MTM visit: Visit date not found   Next visit within 3 mo: Visit date not found  Next physical within 3 mo: Visit date not found  Prescriber OR PCP: Vivi Miguel MD  Last diagnosis associated with med order: 1. Paroxysmal atrial fibrillation (H)  - atenoloL (TENORMIN) 50 MG tablet [Pharmacy Med Name: Atenolol 50 MG Oral Tablet]; Take 1 tablet by mouth once daily  Dispense: 90 tablet; Refill: 0    If protocol passes may refill for 12 months if within 3 months of last provider visit (or a total of 15 months).             Passed - Blood pressure filed in past 12 months     BP Readings from Last 1 Encounters:   12/04/19 (!) 140/102

## 2021-06-10 NOTE — TELEPHONE ENCOUNTER
RN cannot approve Refill Request    RN can NOT refill this medication PCP messaged that patient is overdue for Office Visit. Last office visit: 12/4/2019 Vivi Miguel MD Last Physical: 6/19/2019 Last MTM visit: Visit date not found Last visit same specialty: 12/4/2019 Vivi Miguel MD.  Next visit within 3 mo: Visit date not found  Next physical within 3 mo: Visit date not found      Lillian Araya, [facility] Triage/Med Refill 8/10/2020    Requested Prescriptions   Pending Prescriptions Disp Refills     pramipexole (MIRAPEX) 0.5 MG tablet [Pharmacy Med Name: Pramipexole Dihydrochloride 0.5 MG Oral Tablet] 135 tablet 0     Sig: TAKE 1 & 1/2 (ONE & ONE-HALF) TABLETS BY MOUTH AT BEDTIME       Parkinson's Meds I Refill Protocol Failed - 8/9/2020  8:52 AM        Failed - PCP or prescribing provider visit in past 6 months      Last office visit with prescriber/PCP: Visit date not found OR same dept: 12/4/2019 Vivi Miguel MD OR same specialty: 12/4/2019 Vivi Miguel MD Last physical: Visit date not found Last MTM visit: Visit date not found     Next appt within 3 mo: Visit date not found  Next physical within 3 mo: Visit date not found  Prescriber OR PCP: Vivi Miguel MD  Last diagnosis associated with med order: 1. Restless Legs Syndrome  - pramipexole (MIRAPEX) 0.5 MG tablet [Pharmacy Med Name: Pramipexole Dihydrochloride 0.5 MG Oral Tablet]; TAKE 1 & 1/2 (ONE & ONE-HALF) TABLETS BY MOUTH AT BEDTIME  Dispense: 135 tablet; Refill: 0    If protocol passes may refill for 6 months if within 3 months of last provider visit (or a total of 9 months).             Pramipexole/Ropinirole Refill Protocol Failed - 8/9/2020  8:52 AM        Failed - PCP or prescribing provider visit in past 6 months      Last office visit with prescriber/PCP: Visit date not found OR same dept: 12/4/2019 Vivi Miguel MD OR same specialty: 12/4/2019 Vivi Miguel MD Last  physical: Visit date not found Last MTM visit: Visit date not found         Next appt within 3 mo: Visit date not found  Next physical within 3 mo: Visit date not found  Prescriber OR PCP: Vivi Miguel MD  Last diagnosis associated with med order: 1. Restless Legs Syndrome  - pramipexole (MIRAPEX) 0.5 MG tablet [Pharmacy Med Name: Pramipexole Dihydrochloride 0.5 MG Oral Tablet]; TAKE 1 & 1/2 (ONE & ONE-HALF) TABLETS BY MOUTH AT BEDTIME  Dispense: 135 tablet; Refill: 0     If protocol passes may refill for 6 months if within 3 months of last provider visit (or a total of 9 months).

## 2021-06-11 NOTE — PROGRESS NOTES
Assessment and Plan:   Rogers is a very pleasant 70-year-old gentleman presenting for an annual wellness visit    Patient has been advised of split billing requirements and indicates understanding: Yes    1. Routine general medical examination at a health care facility  - Vitamin D, Total (25-Hydroxy)    2. Hyperlipidemia LDL goal <100    3. Essential hypertension  At goal  - Comprehensive Metabolic Panel  - Vitamin D, Total (25-Hydroxy)    4. Encounter for screening for lipoid disorders  - Lipid [place], FASTING    5. Screening for malignant neoplasm of prostate  - PSA (Prostatic-Specific Antigen), Annual Screen    6. Paroxysmal atrial fibrillation (H)  Follow with cardiology if needed - continue blood thinner    7. Body mass index (bmi) 32.0-32.9, adult   - Vitamin D, Total (25-Hydroxy)     The patient's current medical problems were reviewed.    The following high BMI interventions were performed this visit: encouragement to exercise and weight monitoring  The following health maintenance schedule was reviewed with the patient and provided in printed form in the after visit summary:   Health Maintenance   Topic Date Due     HEPATITIS C SCREENING  1950     ZOSTER VACCINES (2 of 3) 01/13/2016     MEDICARE ANNUAL WELLNESS VISIT  06/19/2020     FALL RISK ASSESSMENT  06/19/2020     LIPID  06/19/2024     ADVANCE CARE PLANNING  06/19/2024     TD 18+ HE  06/19/2029     COLORECTAL CANCER SCREENING  09/26/2029     PNEUMOCOCCAL IMMUNIZATION 65+ LOW/MEDIUM RISK  Completed     INFLUENZA VACCINE RULE BASED  Completed     HEPATITIS B VACCINES  Aged Out        Subjective:   Chief Complaint: Rogers Huffman is an 70 y.o. male here for an Annual Wellness visit.   HPI: Rogers is a very pleasant 60-year-old gentleman presenting for an annual wellness visit.  He is overall doing well.    He has a recent history of atrial fibrillation and is currently on Eliquis.  He follows with cardiology.  He overall does well although notes  that sometimes his heart rate will be somewhat fast particularly when exercising.    He has a history of prostate cancer which was resected.  Since that time he has had some issues with erectile dysfunction.  He is hopeful I can get a PSA today so he does not need to see the urologist unless he needs to as he is trying to stay at home as much as possible due to COVID-19    Review of Systems:   Please see above.  The rest of the review of systems are negative for all systems.    Patient Care Team:  Vivi Miguel MD as PCP - General (Family Medicine)  Joseph Mccurdy MD as Physician (Urology)  Vivi Miguel MD as Assigned PCP     Patient Active Problem List   Diagnosis     Essential hypertension     Abnormal Blood Chemistry     Cellulitis     Abdominal Pain In The Right Lower Belly (RLQ)     Tinea Cruris     Restless Legs Syndrome     Rotator Cuff Tendonitis     Paroxysmal atrial fibrillation (H)     Prostate cancer (H)     Tachycardia     S/P total knee arthroplasty     Benign neoplasm of ascending colon     Hemorrhoids     Polyp of colon     Benign neoplasm of cecum     Past Medical History:   Diagnosis Date     Arthritis 2004    R knee 2018 replaced, Thumbs     Atrial fibrillation (H) 01/2018     pre surgical for prostate cancer     Hypertension 2004     Prostate cancer (H) 2018     Restless legs 2014     only at night time      Past Surgical History:   Procedure Laterality Date     ANTERIOR CRUCIATE LIGAMENT REPAIR Right 2018     APPENDECTOMY  1969     HARDWARE REMOVAL Right 5/3/2018    Procedure: HARDWARE REMOVAL RIGHT KNEE, LATERAL RELEASE;  Surgeon: Twan Yin MD;  Location: [facility];  Service:      MD LAP,PROSTATECTOMY,RADICAL,W/NERVE SPARE,INCL ROBOTIC Bilateral 2/6/2018    Procedure: ROBOTIC ASSISTED RADICAL RETROPUBIC PROSTATECTOMY BILATERAL PELVIC LYMPH NODE DISSECTION LYSIS OF ADHESIONS;  Surgeon: Wm Koo MD;  Location: [facility] OR;  Service:  Urology     IL TOTAL KNEE ARTHROPLASTY Right 5/3/2018    Procedure: RIGHT TOTAL KNEE ARTHROPLASTY, COMPUTER-ASSIST;  Surgeon: Twan Yin MD;  Location: [facility] OR;  Service: Orthopedics     PROSTATECTOMY  2018     SHOULDER SURGERY Left      TONSILLECTOMY        Family History   Problem Relation Age of Onset     Leukemia Mother      Hypertension Father      Cancer Maternal Aunt         bone cancer      Social History     Socioeconomic History     Marital status:      Spouse name: Not on file     Number of children: Not on file     Years of education: Not on file     Highest education level: Not on file   Occupational History     Not on file   Social Needs     Financial resource strain: Not on file     Food insecurity     Worry: Not on file     Inability: Not on file     Transportation needs     Medical: Not on file     Non-medical: Not on file   Tobacco Use     Smoking status: Former Smoker     Last attempt to quit: 1975     Years since quittin.7     Smokeless tobacco: Never Used   Substance and Sexual Activity     Alcohol use: Yes     Alcohol/week: 1.0 standard drinks     Types: 1 Glasses of wine per week     Drug use: No     Sexual activity: Not on file   Lifestyle     Physical activity     Days per week: Not on file     Minutes per session: Not on file     Stress: Not on file   Relationships     Social connections     Talks on phone: Not on file     Gets together: Not on file     Attends Mu-ism service: Not on file     Active member of club or organization: Not on file     Attends meetings of clubs or organizations: Not on file     Relationship status: Not on file     Intimate partner violence     Fear of current or ex partner: Not on file     Emotionally abused: Not on file     Physically abused: Not on file     Forced sexual activity: Not on file   Other Topics Concern     Not on file   Social History Narrative     Not on file      Current Outpatient Medications   Medication Sig  "Dispense Refill     apixaban (ELIQUIS) 5 mg Tab tablet Take 1 tablet (5 mg total) by mouth 2 (two) times a day. 180 tablet 1     atenoloL (TENORMIN) 50 MG tablet Take 1 tablet by mouth once daily 90 tablet 0     atorvastatin (LIPITOR) 10 MG tablet Take 1 tablet (10 mg total) by mouth daily. 90 tablet 3     cholecalciferol, vitamin D3, 5,000 unit Tab Take by mouth.       DOCOSAHEXANOIC ACID/EPA (FISH OIL ORAL) Take 600 mg by mouth daily.        multivitamin therapeutic tablet Take 1 tablet by mouth daily.       pramipexole (MIRAPEX) 0.5 MG tablet TAKE 1 & 1/2 (ONE & ONE-HALF) TABLETS BY MOUTH AT BEDTIME 135 tablet 0     sildenafil (VIAGRA) 50 MG tablet Take 100 mg by mouth every other day.        No current facility-administered medications for this visit.       Objective:   Vital Signs:   Visit Vitals  BP (!) 153/111   Pulse 85   Temp 96.7  F (35.9  C) (Oral)   Resp 16   Ht 5' 11.25\" (1.81 m)   Wt (!) 234 lb 6 oz (106.3 kg)   BMI 32.46 kg/m           VisionScreening:  No exam data present     PHYSICAL EXAM  Objective:  Vital signs reviewed and stable  General: No acute distress  Psych: Appropriate affect  HEENT: moist mucous membranes, pupils equal, round, reactive to light and accomodation, posterior oropharynx clear of erythema or exudate, tympanic membranes are pearly grey bilaterally  Lymph: no cervical or supraclavicular lymphadenopathy  Cardiovascular: regular rate and rhythm with no murmur  Pulmonary: clear to auscultation bilaterally with no wheeze  Abdomen: soft, non tender, non distended with normo-active bowel sounds  Extremities: warm and well perfused with no edema  Skin: warm and dry with no rash      Assessment Results 9/18/2020   Activities of Daily Living No help needed   Instrumental Activities of Daily Living No help needed   Mini Cog Total Score 5   Some recent data might be hidden     A Mini-Cog score of 0-2 suggests the possibility of dementia, score of 3-5 suggests no dementia      Identified " Health Risks:     The patient reports that he drinks more than one alcoholic drink per day and sometimes engages in binge or excessive drinking. He was counseled and given information about possible harmful effects of excessive alcohol intake as well as where to get help for alcohol problems.  The patient was provided with written information regarding signs of hearing loss.  Patient's advanced directive was discussed and I am comfortable with the patient's wishes.

## 2021-06-12 NOTE — TELEPHONE ENCOUNTER
RN cannot approve Refill Request    RN can NOT refill this medication med is not covered by policy/route to provider. Last office visit: 12/4/2019 Vivi Miguel MD Last Physical: 9/18/2020 Last MTM visit: Visit date not found Last visit same specialty: 12/4/2019 Vivi Miguel MD.  Next visit within 3 mo: Visit date not found  Next physical within 3 mo: Visit date not found      Lillian Araya, [facility] Triage/Med Refill 10/17/2020    Requested Prescriptions   Pending Prescriptions Disp Refills     ELIQUIS 5 mg Tab tablet [Pharmacy Med Name: Eliquis 5 MG Oral Tablet] 180 tablet 0     Sig: Take 1 tablet by mouth twice daily       Apixaban/Rivaroxaban/Dabigatran Refill Protocol Failed - 10/14/2020  8:05 AM        Failed - Route to appropriate pool/provider     Last Anticoagulation Summary:           Passed - Renal function test in last year     Creatinine   Date Value Ref Range Status   09/18/2020 1.01 0.70 - 1.30 mg/dL Final             Passed - PCP or prescribing provider visit in past 12 months       Last office visit with prescriber/PCP: 12/4/2019 Vivi Miguel MD OR same dept: 12/4/2019 Vivi Miguel MD OR same specialty: 12/4/2019 Vivi Miguel MD  Last physical: 9/18/2020 Last MTM visit: Visit date not found   Next visit within 3 mo: Visit date not found  Next physical within 3 mo: Visit date not found  Prescriber OR PCP: Vivi Miguel MD  Last diagnosis associated with med order: 1. Paroxysmal atrial fibrillation (H)  - ELIQUIS 5 mg Tab tablet [Pharmacy Med Name: Eliquis 5 MG Oral Tablet]; Take 1 tablet by mouth twice daily  Dispense: 180 tablet; Refill: 0  - atenoloL (TENORMIN) 50 MG tablet [Pharmacy Med Name: Atenolol 50 MG Oral Tablet]; Take 1 tablet by mouth once daily  Dispense: 90 tablet; Refill: 0    If protocol passes may refill for 12 months if within 3 months of last provider visit (or a total of 15 months).                atenoloL  (TENORMIN) 50 MG tablet [Pharmacy Med Name: Atenolol 50 MG Oral Tablet] 90 tablet 0     Sig: Take 1 tablet by mouth once daily       Beta-Blockers Refill Protocol Passed - 10/14/2020  8:05 AM        Passed - PCP or prescribing provider visit in past 12 months or next 3 months     Last office visit with prescriber/PCP: 12/4/2019 Vivi Miguel MD OR same dept: 12/4/2019 Vivi Miguel MD OR same specialty: 12/4/2019 Vivi Miguel MD  Last physical: 9/18/2020 Last MTM visit: Visit date not found   Next visit within 3 mo: Visit date not found  Next physical within 3 mo: Visit date not found  Prescriber OR PCP: Vivi Miguel MD  Last diagnosis associated with med order: 1. Paroxysmal atrial fibrillation (H)  - ELIQUIS 5 mg Tab tablet [Pharmacy Med Name: Eliquis 5 MG Oral Tablet]; Take 1 tablet by mouth twice daily  Dispense: 180 tablet; Refill: 0  - atenoloL (TENORMIN) 50 MG tablet [Pharmacy Med Name: Atenolol 50 MG Oral Tablet]; Take 1 tablet by mouth once daily  Dispense: 90 tablet; Refill: 0    If protocol passes may refill for 12 months if within 3 months of last provider visit (or a total of 15 months).             Passed - Blood pressure filed in past 12 months     BP Readings from Last 1 Encounters:   09/18/20 146/86
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
## 2021-06-13 NOTE — TELEPHONE ENCOUNTER
Refill Approved    Rx renewed per Medication Renewal Policy. Medication was last renewed on 08/10/2020.   Last office visit was 09/18/2020 with PCP.    Yasmin Salguero, [facility] Triage/Med Refill 11/13/2020     Requested Prescriptions   Pending Prescriptions Disp Refills     pramipexole (MIRAPEX) 0.5 MG tablet [Pharmacy Med Name: Pramipexole Dihydrochloride 0.5 MG Oral Tablet] 135 tablet 0     Sig: TAKE 1 & 1/2 (ONE & ONE-HALF) TABLETS BY MOUTH AT BEDTIME       Parkinson's Meds I Refill Protocol Passed - 11/9/2020  8:21 PM        Passed - PCP or prescribing provider visit in past 6 months      Last office visit with prescriber/PCP: Visit date not found OR same dept: 12/4/2019 Vivi Miguel MD OR same specialty: 12/4/2019 Vivi Miguel MD Last physical: 9/18/2020 Last MTM visit: Visit date not found     Next appt within 3 mo: Visit date not found  Next physical within 3 mo: Visit date not found  Prescriber OR PCP: Vivi Miguel MD  Last diagnosis associated with med order: 1. Restless Legs Syndrome  - pramipexole (MIRAPEX) 0.5 MG tablet [Pharmacy Med Name: Pramipexole Dihydrochloride 0.5 MG Oral Tablet]; TAKE 1 & 1/2 (ONE & ONE-HALF) TABLETS BY MOUTH AT BEDTIME  Dispense: 135 tablet; Refill: 0    If protocol passes may refill for 6 months if within 3 months of last provider visit (or a total of 9 months).             Pramipexole/Ropinirole Refill Protocol Passed - 11/9/2020  8:21 PM        Passed - PCP or prescribing provider visit in past 6 months      Last office visit with prescriber/PCP: Visit date not found OR same dept: 12/4/2019 Vivi Miguel MD OR same specialty: 12/4/2019 Vivi Miguel MD Last physical: 9/18/2020 Last MTM visit: Visit date not found         Next appt within 3 mo: Visit date not found  Next physical within 3 mo: Visit date not found  Prescriber OR PCP: Vivi Miguel MD  Last diagnosis associated with med order: 1. Restless Legs  Syndrome  - pramipexole (MIRAPEX) 0.5 MG tablet [Pharmacy Med Name: Pramipexole Dihydrochloride 0.5 MG Oral Tablet]; TAKE 1 & 1/2 (ONE & ONE-HALF) TABLETS BY MOUTH AT BEDTIME  Dispense: 135 tablet; Refill: 0     If protocol passes may refill for 6 months if within 3 months of last provider visit (or a total of 9 months).

## 2021-06-14 NOTE — TELEPHONE ENCOUNTER
RN cannot approve Refill Request    RN can NOT refill this medication med is not covered by policy/route to provider. Last office visit: 12/4/2019 Vivi Miguel MD Last Physical: 9/18/2020 Last MTM visit: Visit date not found Last visit same specialty: 12/4/2019 Vivi Miguel MD.  Next visit within 3 mo: Visit date not found  Next physical within 3 mo: Visit date not found      Lillian Araya, [facility] Triage/Med Refill 1/12/2021    Requested Prescriptions   Pending Prescriptions Disp Refills     ELIQUIS 5 mg Tab tablet [Pharmacy Med Name: Eliquis 5 MG Oral Tablet] 180 tablet 0     Sig: Take 1 tablet by mouth twice daily       Apixaban/Rivaroxaban/Dabigatran Refill Protocol Failed - 1/11/2021  8:07 PM        Failed - Route to appropriate pool/provider     Last Anticoagulation Summary:           Passed - Renal function test in last year     Creatinine   Date Value Ref Range Status   09/18/2020 1.01 0.70 - 1.30 mg/dL Final             Passed - PCP or prescribing provider visit in past 12 months       Last office visit with prescriber/PCP: 12/4/2019 Vivi Miguel MD OR same dept: Visit date not found OR same specialty: 12/4/2019 Vivi Miguel MD  Last physical: 9/18/2020 Last MTM visit: Visit date not found   Next visit within 3 mo: Visit date not found  Next physical within 3 mo: Visit date not found  Prescriber OR PCP: Vivi Miguel MD  Last diagnosis associated with med order: 1. Paroxysmal atrial fibrillation (H)  - ELIQUIS 5 mg Tab tablet [Pharmacy Med Name: Eliquis 5 MG Oral Tablet]; Take 1 tablet by mouth twice daily  Dispense: 180 tablet; Refill: 0    If protocol passes may refill for 12 months if within 3 months of last provider visit (or a total of 15 months).

## 2021-06-15 NOTE — TELEPHONE ENCOUNTER
Refill Approved    Rx renewed per Medication Renewal Policy. Medication was last renewed on 2/12/20.    Bernardino Barton, [facility] Triage/Med Refill 2/16/2021     Requested Prescriptions   Pending Prescriptions Disp Refills     atorvastatin (LIPITOR) 10 MG tablet [Pharmacy Med Name: Atorvastatin Calcium 10 MG Oral Tablet] 90 tablet 0     Sig: Take 1 tablet by mouth once daily       Statins Refill Protocol (Hmg CoA Reductase Inhibitors) Passed - 2/15/2021  9:48 PM        Passed - PCP or prescribing provider visit in past 12 months      Last office visit with prescriber/PCP: 12/4/2019 Vivi Miguel MD OR same dept: Visit date not found OR same specialty: 12/4/2019 Vivi Miguel MD  Last physical: 9/18/2020 Last MTM visit: Visit date not found   Next visit within 3 mo: Visit date not found  Next physical within 3 mo: Visit date not found  Prescriber OR PCP: Vivi Miguel MD  Last diagnosis associated with med order: 1. Hyperlipidemia LDL goal <100  - atorvastatin (LIPITOR) 10 MG tablet [Pharmacy Med Name: Atorvastatin Calcium 10 MG Oral Tablet]; Take 1 tablet by mouth once daily  Dispense: 90 tablet; Refill: 0    If protocol passes may refill for 12 months if within 3 months of last provider visit (or a total of 15 months).

## 2021-06-15 NOTE — ANESTHESIA POSTPROCEDURE EVALUATION
Patient: Rogers Huffman  ROBOTIC ASSISTED RADICAL RETROPUBIC PROSTATECTOMY BILATERAL PELVIC LYMPH NODE DISSECTION LYSIS OF ADHESIONS  Anesthesia type: general    Patient location: PACU  Last vitals:   Vitals:    02/06/18 1415   BP: 109/76   Pulse: 99   Resp: 14   Temp: 36.7  C (98  F)   SpO2: 98%     Post vital signs: stable  Level of consciousness: awake and responds to simple questions  Post-anesthesia pain: pain controlled  Post-anesthesia nausea and vomiting: no  Pulmonary: unassisted, return to baseline  Cardiovascular: stable and blood pressure at baseline  Hydration: adequate  Anesthetic events: no    QCDR Measures:  ASA# 11 - [place]-op Cardiac Arrest: ASA11B - Patient did NOT experience unanticipated cardiac arrest  ASA# 12 - [place]-op Mortality Rate: ASA12B - Patient did NOT die  ASA# 13 - PACU Re-Intubation Rate: ASA13B - Patient did NOT require a new airway mgmt  ASA# 10 - Composite Anes Safety: ASA10A - No serious adverse event    Additional Notes: Patient in atrial fibrillation the entirety of the procedure.  Initially concerned for Rapid Ventricular Response, now rate controlled.

## 2021-06-15 NOTE — ANESTHESIA CARE TRANSFER NOTE
Last vitals:   Vitals:    02/06/18 1234   BP: 113/78   Pulse: 82   Resp: 12   Temp: 36.1  C (96.9  F)   SpO2: 98%     Patient's level of consciousness is drowsy  Spontaneous respirations: yes  Maintains airway independently: yes  Dentition unchanged: yes  Oropharynx: oropharynx clear of all foreign objects    QCDR Measures:  ASA# 20 - Surgical Safety Checklist: WHO surgical safety checklist completed prior to induction  PQRS# 430 - Adult PONV Prevention: 4558F - Pt received => 2 anti-emetic agents (different classes) preop & intraop  ASA# 8 - Peds PONV Prevention: NA - Not pediatric patient, not GA or 2 or more risk factors NOT present  PQRS# 424 - [place]-op Temp Management: 4559F - At least one body temp DOCUMENTED => 35.5C or 95.9F within required timeframe  PQRS# 426 - PACU Transfer Protocol: - Transfer of care checklist used  ASA# 14 - Acute Post-op Pain: ASA14B - Patient did NOT experience pain >= 7 out of 10

## 2021-06-15 NOTE — PROGRESS NOTES
"Preoperative Exam    Scheduled Procedure: Prostatectomy  Surgery Date:  2/6/18  Surgery Location: [facility], fax [phone]    Surgeon:  Dr. Koo    Assessment/Plan:     1. Preoperative testing  - Basic Metabolic Panel  - Hemoglobin  - Electrocardiogram Perform and Read    2. Prostate cancer    Surgical Procedure Risk: Intermediate (reported cardiac risk generally 1-5%)  Have you had prior anesthesia?: Yes  Have you or any family members had a previous anesthesia reaction:  No  Do you or any family members have a history of a clotting or bleeding disorder?: No  Cardiac Risk Assessment: Patient's EKG today showed atrial fibrillation.  I have elected to try to get him into rapid access clinic for cardiology given that he has an upcoming surgery.  His chads 2 vasc score is \"2\" given age and history of hypertension.  He is overall asymptomatic other than he notes that his pulse will increase when he is taking his blood pressure particularly with positional changes.  He is currently taking metoprolol XL 50 mg daily but not taking the hydrochlorothiazide due to upcoming surgery.    His EKG also shows a potential past inferior infarct.  Patient is completely asymptomatic today.    ADDENDUM 2/1/2018 - the patient was seen by Dr. Joseph - cardiology.   An echocardiogram and TSH were normal.  Patient will continue his metoprolol and is cleared for to proceed with surgery without further evaluation.  It is recommended that after surgery when deemed appropriate he start on an aspirin.        Functional Status: Independant  Patient plans to recover at home with family.     Subjective:      Rogers Huffman is a 67 y.o. male who presents for a preoperative consultation.  He had had elevated PSAs for the last several years.  Him and his previous primary care doctor were watching but over the last few years they have elevated further and I recommended a referral to the urologist for further evaluation.  Prostate " biopsies were positive thus he is pursuing surgical prostatectomy.    In addition to the indication for surgery patient has a past medical history significant for hypertension which is well-controlled with 50 mg of metoprolol succinate daily.    He does mention that he notices that his pulse will range from the 60s to the mid to upper 90s.  He states that this can happen with position changes.  He does not feel any palpitations or chest pain.    All other systems reviewed and are negative, other than those listed in the HPI.    Pertinent History  Do you have difficulty breathing or chest pain after walking up a flight of stairs: No  History of obstructive sleep apnea: No  Steroid use in the last 6 months: Sunil. Injection in his knee  Frequent Aspirin/NSAID use: Yes: Baby aspirin - holding currently  Prior Blood Transfusion: No  Prior Blood Transfusion Reaction: No  If for some reason prior to, during or after the procedure, if it is medically indicated, would you be willing to have a blood transfusion?:  There is no transfusion refusal.    Current Outpatient Prescriptions   Medication Sig Dispense Refill     aspirin 81 mg chewable tablet Chew 81 mg daily.       CALCIUM CARBONATE/VITAMIN D3 (CALCIUM 600 + D,3, ORAL) Take 1 tablet by mouth daily.       DOCOSAHEXANOIC ACID/EPA (FISH OIL ORAL) Take 1,200 mg by mouth daily.       FOLIC ACID/MULTIVITS-MIN/LUT (CENTRUM SILVER ORAL) Take 1 tablet by mouth daily.       metoprolol succinate (TOPROL XL) 50 MG 24 hr tablet Take 1 tablet (50 mg total) by mouth daily. 90 tablet 3     pramipexole (MIRAPEX) 0.5 MG tablet TAKE 1 TABLET (0.5 MG TOTAL) BY MOUTH BEDTIME. 90 tablet 2     hydroCHLOROthiazide (HYDRODIURIL) 25 MG tablet Take 1 tablet (25 mg total) by mouth daily. 30 tablet 1     No current facility-administered medications for this visit.         No Known Allergies    Patient Active Problem List   Diagnosis     Essential Hypertension     Abnormal Blood Chemistry      "Cellulitis     Abdominal Pain In The Right Lower Belly (RLQ)     Tinea Cruris     Restless Legs Syndrome     Rotator Cuff Tendonitis       Past Medical History:   Diagnosis Date     Arthritis      Hypertension      Prostate cancer      Restless legs        Past Surgical History:   Procedure Laterality Date     ANTERIOR CRUCIATE LIGAMENT REPAIR Right      APPENDECTOMY       SHOULDER SURGERY Left      TONSILLECTOMY         Social History     Social History     Marital status:      Spouse name: N/A     Number of children: N/A     Years of education: N/A     Occupational History     Not on file.     Social History Main Topics     Smoking status: Never Smoker     Smokeless tobacco: Never Used     Alcohol use Not on file     Drug use: Not on file     Sexual activity: Not on file     Other Topics Concern     Not on file     Social History Narrative       Patient Care Team:  Vivi Miguel MD as PCP - General (Family Medicine)  Joseph Mccurdy MD as Physician (Urology)          Objective:     Vitals:    01/24/18 0806   BP: 122/84   Pulse: 76   Resp: 16   Temp: 97.4  F (36.3  C)   TempSrc: Oral   Weight: (!) 226 lb (102.5 kg)   Height: 5' 11\" (1.803 m)         Physical Exam:  Objective:  Vital signs reviewed and stable  General: No acute distress  Psych: Appropriate affect  HEENT: moist mucous membranes, pupils equal, round, reactive to light and accomodation, posterior oropharynx clear of erythema or exudate, tympanic membranes are pearly grey bilaterally  Lymph: no cervical or supraclavicular lymphadenopathy  Cardiovascular: regular rate and rhythm with no murmur  Pulmonary: clear to auscultation bilaterally with no wheeze  Abdomen: soft, non tender, non distended with normo-active bowel sounds  Extremities: warm and well perfused with no edema  Skin: warm and dry with no rash      Patient Instructions     Current Outpatient Prescriptions   Medication Sig     aspirin 81 mg chewable tablet Stop 7 days " prior to surgery     CALCIUM CARBONATE/VITAMIN D3 (CALCIUM 600 + D,3, ORAL) Do not take morning of surgery     DOCOSAHEXANOIC ACID/EPA (FISH OIL ORAL) Do not take morning of surgery     FOLIC ACID/MULTIVITS-MIN/LUT (CENTRUM SILVER ORAL) Do not take morning of surgery     hydroCHLOROthiazide (HYDRODIURIL) 25 MG tablet Hold for now     metoprolol succinate (TOPROL XL) 50 MG 24 hr tablet OK to take morning of surgery     pramipexole (MIRAPEX) 0.5 MG tablet OK to take night before surgery.           Labs:  Recent Results (from the past 120 hour(s))   Electrocardiogram Perform and Read    Collection Time: 01/24/18  8:36 AM   Result Value Ref Range    SYSTOLIC BLOOD PRESSURE  mmHg    DIASTOLIC BLOOD PRESSURE  mmHg    VENTRICULAR RATE 98 BPM    ATRIAL RATE 129 BPM    P-R INTERVAL  ms    QRS DURATION 98 ms    Q-T INTERVAL 350 ms    QTC CALCULATION (BEZET) 446 ms    P Axis  degrees    R AXIS -19 degrees    T AXIS 14 degrees    MUSE DIAGNOSIS       Atrial fibrillation  Inferior infarct , age undetermined  Abnormal ECG  When compared with ECG of 19-DEC-2016 13:21,  Atrial fibrillation has replaced Sinus rhythm  Vent. rate has increased BY  43 BPM         Immunization History   Administered Date(s) Administered     DT (pediatric) 06/11/2003     Influenza high dose, seasonal 09/26/2017     Influenza, inj, historic,unspecified 11/18/2015, 10/17/2016     Influenza,seasonal quad, PF 01/07/2015     Pneumo Conj 13-V (2010&after) 06/04/2015     Pneumo Polysac 23-V 09/26/2017     Td,adult,historic,unspecified 06/11/2003     Tdap 05/09/2009     ZOSTER 11/18/2015           Electronically signed by Vivi Miguel MD 01/24/18 8:03 AM

## 2021-06-16 NOTE — TELEPHONE ENCOUNTER
Telephone Encounter by Anna Epperson RN at 6/26/2019  8:23 AM     Author: Anna Epperson RN Service: -- Author Type: Registered Nurse    Filed: 6/26/2019  8:28 AM Encounter Date: 6/21/2019 Status: Signed    : Anna Epperson RN (Registered Nurse)       Called [facility] with message below.   ============================  Rakel Joseph MD  You 12 hours ago (7:52 PM)      That would be fine. Go ahead and hold as advised by YOMAIRA. Thanks!

## 2021-06-16 NOTE — TELEPHONE ENCOUNTER
metoprolol succinate (TOPROL-XL) 100 MG 24 hr tablet  200 mg, Oral, DAILY 3/30/2021 ROBERT BARRIOS E 180 tablet            Edit       Summary: Take 2 tablets (200 mg total) by mouth daily., Starting Tue 3/30/2021, No Print   Dose, Frequency: 200 mg, DAILY  Ord/Sold: 3/30/2021 (O)  Report  Taking:   Long-term:   Pharmacy: [facility] [address] [place] [address] RD E  Med Dose History       Patient Sig: Take 2 tablets (200 mg total) by mouth daily.       Ordered on: 3/30/2021       Note to Pharmacy: Dose increased 1/16/21        CASE:  No print.  Sending refill to pharmacy per protocol.    Refill Approved    Rx renewed per Medication Renewal Policy. Medication was last renewed on 12/9/21.    Bernardino Barton, [facility] Triage/Med Refill 4/20/2021     Requested Prescriptions   Pending Prescriptions Disp Refills     metoprolol succinate (TOPROL-XL) 100 MG 24 hr tablet [Pharmacy Med Name: Metoprolol Succinate  MG Oral Tablet Extended Release 24 Hour] 90 tablet 0     Sig: Take 1 tablet by mouth once daily       Beta-Blockers Refill Protocol Passed - 4/19/2021  8:41 PM        Passed - PCP or prescribing provider visit in past 12 months or next 3 months     Last office visit with prescriber/PCP: 12/4/2019 Vivi Miguel MD OR same dept: Visit date not found OR same specialty: 12/4/2019 Vivi Miguel MD  Last physical: 9/18/2020 Last MTM visit: Visit date not found   Next visit within 3 mo: Visit date not found  Next physical within 3 mo: Visit date not found  Prescriber OR PCP: Vivi Miguel MD  Last diagnosis associated with med order: 1. Essential hypertension  - metoprolol succinate (TOPROL-XL) 100 MG 24 hr tablet [Pharmacy Med Name: Metoprolol Succinate  MG Oral Tablet Extended Release 24 Hour]; Take 1 tablet by mouth once daily  Dispense: 90 tablet; Refill: 0    2. Paroxysmal atrial fibrillation (H)  - ELIQUIS 5 mg Tab tablet [Pharmacy Med Name:  Eliquis 5 MG Oral Tablet]; Take 1 tablet by mouth twice daily  Dispense: 180 tablet; Refill: 0    If protocol passes may refill for 12 months if within 3 months of last provider visit (or a total of 15 months).             Passed - Blood pressure filed in past 12 months     BP Readings from Last 1 Encounters:   03/25/21 (!) 142/92                ELIQUIS 5 mg Tab tablet [Pharmacy Med Name: Eliquis 5 MG Oral Tablet] 180 tablet 0     Sig: Take 1 tablet by mouth twice daily       Apixaban/Rivaroxaban/Dabigatran Refill Protocol Failed - 4/19/2021  8:41 PM        Failed - Route to appropriate pool/provider     Last Anticoagulation Summary:           Passed - Renal function test in last year     Creatinine   Date Value Ref Range Status   09/18/2020 1.01 0.70 - 1.30 mg/dL Final             Passed - PCP or prescribing provider visit in past 12 months       Last office visit with prescriber/PCP: 12/4/2019 Vivi Miguel MD OR same dept: Visit date not found OR same specialty: 12/4/2019 Vivi Miguel MD  Last physical: 9/18/2020 Last MTM visit: Visit date not found   Next visit within 3 mo: Visit date not found  Next physical within 3 mo: Visit date not found  Prescriber OR PCP: Vivi Miguel MD  Last diagnosis associated with med order: 1. Essential hypertension  - metoprolol succinate (TOPROL-XL) 100 MG 24 hr tablet [Pharmacy Med Name: Metoprolol Succinate  MG Oral Tablet Extended Release 24 Hour]; Take 1 tablet by mouth once daily  Dispense: 90 tablet; Refill: 0    2. Paroxysmal atrial fibrillation (H)  - ELIQUIS 5 mg Tab tablet [Pharmacy Med Name: Eliquis 5 MG Oral Tablet]; Take 1 tablet by mouth twice daily  Dispense: 180 tablet; Refill: 0    If protocol passes may refill for 12 months if within 3 months of last provider visit (or a total of 15 months).

## 2021-06-16 NOTE — TELEPHONE ENCOUNTER
RN cannot approve Refill Request    RN can NOT refill this medication Protocol failed and NO refill given. Last office visit: 12/4/2019 Vivi Miguel MD Last Physical: 9/18/2020 Last MTM visit: Visit date not found Last visit same specialty: 12/4/2019 Vivi Miguel MD.  Next visit within 3 mo: Visit date not found  Next physical within 3 mo: Visit date not found      Bernardino SAMUELSChuy Barton, Care Connection Triage/Med Refill 4/20/2021    Requested Prescriptions   Pending Prescriptions Disp Refills     ELIQUIS 5 mg Tab tablet [Pharmacy Med Name: Eliquis 5 MG Oral Tablet] 180 tablet 0     Sig: Take 1 tablet by mouth twice daily       Apixaban/Rivaroxaban/Dabigatran Refill Protocol Failed - 4/19/2021  8:41 PM        Failed - Route to appropriate pool/provider     Last Anticoagulation Summary:           Passed - Renal function test in last year     Creatinine   Date Value Ref Range Status   09/18/2020 1.01 0.70 - 1.30 mg/dL Final             Passed - PCP or prescribing provider visit in past 12 months       Last office visit with prescriber/PCP: 12/4/2019 Vivi Miguel MD OR same dept: Visit date not found OR same specialty: 12/4/2019 Vivi Miguel MD  Last physical: 9/18/2020 Last MTM visit: Visit date not found   Next visit within 3 mo: Visit date not found  Next physical within 3 mo: Visit date not found  Prescriber OR PCP: Vivi Miguel MD  Last diagnosis associated with med order: 1. Essential hypertension  - metoprolol succinate (TOPROL-XL) 100 MG 24 hr tablet; Take 2 tablets (200 mg total) by mouth daily.  Dispense: 180 tablet; Refill: 1    2. Paroxysmal atrial fibrillation (H)  - ELIQUIS 5 mg Tab tablet [Pharmacy Med Name: Eliquis 5 MG Oral Tablet]; Take 1 tablet by mouth twice daily  Dispense: 180 tablet; Refill: 0    If protocol passes may refill for 12 months if within 3 months of last provider visit (or a total of 15 months).              Signed Prescriptions Disp  Refills    metoprolol succinate (TOPROL-XL) 100 MG 24 hr tablet 180 tablet 1     Sig: Take 2 tablets (200 mg total) by mouth daily.       Beta-Blockers Refill Protocol Passed - 4/19/2021  8:41 PM        Passed - PCP or prescribing provider visit in past 12 months or next 3 months     Last office visit with prescriber/PCP: 12/4/2019 Vivi Miguel MD OR same dept: Visit date not found OR same specialty: 12/4/2019 Vivi Miguel MD  Last physical: 9/18/2020 Last MTM visit: Visit date not found   Next visit within 3 mo: Visit date not found  Next physical within 3 mo: Visit date not found  Prescriber OR PCP: Vivi Miguel MD  Last diagnosis associated with med order: 1. Essential hypertension  - metoprolol succinate (TOPROL-XL) 100 MG 24 hr tablet; Take 2 tablets (200 mg total) by mouth daily.  Dispense: 180 tablet; Refill: 1    2. Paroxysmal atrial fibrillation (H)  - ELIQUIS 5 mg Tab tablet [Pharmacy Med Name: Eliquis 5 MG Oral Tablet]; Take 1 tablet by mouth twice daily  Dispense: 180 tablet; Refill: 0    If protocol passes may refill for 12 months if within 3 months of last provider visit (or a total of 15 months).             Passed - Blood pressure filed in past 12 months     BP Readings from Last 1 Encounters:   03/25/21 (!) 142/92

## 2021-06-17 NOTE — PATIENT INSTRUCTIONS - HE
Patient Instructions by Vivi Miguel MD at 6/19/2019  8:40 AM     Author: Vivi Miguel MD Service: -- Author Type: Physician    Filed: 6/19/2019 10:43 AM Encounter Date: 6/19/2019 Status: Signed    : Vivi Miguel MD (Physician)         Patient Education   Urinary Incontinence (Male)    Urinary incontinence means not being able to control the release of urine from the bladder.  Causes  Common causes of urinary incontinence in men include:    Infection    Certain medicines    Aging    Poor pelvic muscle tone    Bladder spasms    Obesity    Urinary retention  Nervous system diseases, diabetes, sleep apnea, urinary tract infections, prostate surgery, and pelvic trauma can also cause incontinence. Constipation and smoking have also been identified as risk factors.  Symptoms    Urge incontinence (also called overactive bladder) is a sudden urge to urinate even though there may not be much urine in the bladder. The need to urinate often during the night is common. It is due to bladder spasms.    Stress incontinence is involuntary urine leakage that can occur with sneezing, coughing, and other actions that put stress on the bladder.  Treatment  Treatment of urinary incontinence depends on the cause. Infections of the bladder are treated with antibiotics. Urinary retention is treated with a bladder catheter.  Home care  Follow these guidelines when caring for yourself at home:    Don't consume foods and drinks that may irritate the bladder. These include drinks containing alcohol, caffeinate, or carbonation; chocolate; and acidic fruits and juices.    Limit fluid intake to 6 to 8 cups a day.    Lose weight if you are overweight. This will reduce your symptoms.    If needed, wear absorbent pads to catch urine. Change pads frequently to maintain hygiene and prevent skin and bladder infections.    Bathe daily to maintain good hygiene.    If an antibiotic was prescribed to treat a bladder  infection, be sure to take it until finished, even if you are feeling better before then. This is to make sure your infection has cleared.    If a catheter was left in place, it is important to keep bacteria from getting into the collection bag. Don't disconnect the catheter from the collection bag.    Use a leg band to secure the catheter drainage tube, so it does not pull on the catheter. Drain the collection bag when it becomes full using the drain spout at the bottom of the bag. Don't disconnect the bag from the catheter.    Don't pull on or try to remove a catheter. The catheter must be removed by a healthcare provider.  Follow-up care  Follow up with your healthcare provider, or as advised.  When to seek medical advice  Call your healthcare provider right away if any of these occur:    Fever over 100.4 F (38 C), or as directed by your healthcare provider    Bladder pain or fullness    Abdominal swelling, nausea, or vomiting    Back pain    Weakness, dizziness, or fainting    If a catheter was left in place, return if:  ? Catheter falls out  ? Catheter stops draining for 6 hours  Date Last Reviewed: 10/1/2017    0227-2936 [employer]. [address] [place] [address]. All rights reserved. This information is not intended as a substitute for professional medical care. Always follow your healthcare professional's instructions.       Advance Directive  Patients advance directive was discussed and I am comfortable with the patients wishes.  Patient Education   Personalized Prevention Plan  You are due for the preventive services outlined below.  Your care team is available to assist you in scheduling these services.  If you have already completed any of these items, please share that information with your care team to update in your medical record.  Health Maintenance   Topic Date Due   ? ADVANCE DIRECTIVES DISCUSSED WITH PATIENT  10/04/2010   ? ZOSTER VACCINES (2 of 3) 01/13/2016   ? FALL  RISK ASSESSMENT  07/14/2017   ? TD 18+ HE  05/09/2019   ? INFLUENZA VACCINE RULE BASED (Season Ended) 08/01/2019   ? COLONOSCOPY  07/14/2024   ? PNEUMOCOCCAL POLYSACCHARIDE VACCINE AGE 65 AND OVER  Completed   ? PNEUMOCOCCAL CONJUGATE VACCINE FOR ADULTS (PCV13 OR PREVNAR)  Completed

## 2021-06-17 NOTE — ANESTHESIA PREPROCEDURE EVALUATION
Anesthesia Evaluation      Patient summary reviewed   No history of anesthetic complications     Airway   Mallampati: II  Neck ROM: full   Pulmonary - negative ROS and normal exam                          Cardiovascular - normal exam  Exercise tolerance: > or = 4 METS  (+) hypertension, dysrhythmias (A-fib), ,     ECG reviewed        Neuro/Psych      Endo/Other    (+) arthritis, obesity (Obesity),      GI/Hepatic/Renal            Dental - normal exam                        Anesthesia Plan  Planned anesthetic: spinal and peripheral nerve block  Decadron, Zofran.  Diprivan infusion.  Ketamine (0.5 mg/kg).  Tibial and saphenous nerve blocks for POP per surgeon request.  ASA 2     Anesthetic plan and risks discussed with: patient  Anesthesia plan special considerations: antiemetics,   Post-op plan: routine recovery

## 2021-06-17 NOTE — PROGRESS NOTES
Assessment/Plan:    Rogers Huffman is a 67 y.o. male presenting for:    1. Status post total right knee replacement  Patient is overall doing very well.  I encouraged him to continue physical therapy.  Because he is on Eliquis I do not think that he should necessarily be taking Toradol scheduled for a long period of time.  I encouraged him to continue his oxycodone and Tylenol.  He will let me know if he feels as though he needs anything else.  - oxyCODONE (ROXICODONE) 5 MG immediate release tablet; Take 1-2 tablets (5-10 mg total) by mouth every 4 (four) hours as needed.  Dispense: 50 tablet; Refill: 0    All medications were reconciled today.    Medications Discontinued During This Encounter   Medication Reason     oxyCODONE (ROXICODONE) 5 MG immediate release tablet Reorder     oxyCODONE (ROXICODONE) 5 MG immediate release tablet Reorder     ketorolac (TORADOL) 10 mg tablet            Chief Complaint:  Chief Complaint   Patient presents with     Post-op Problem     5/4/18 R Total Knee- has some slight bleeding, maybe oozing wants to R/O infection to the area       Subjective:   Rogers Huffman is a very pleasant 67-year-old gentleman presenting to the clinic today for hospital follow-up.  The patient had a right total knee replacement done on May 3.  He was in the hospital from May 3 - May 4.  The procedure went very well.  Patient is on Eliquis for atrial fibrillation.  His chads 2 score is 2 and thus should not need bridging with Lovenox.  He was able to get back on his Eliquis 24 hours after the procedure.  He does note that he had an episode of bleeding from the incision site and was seen at or so quick earlier this week and pressure was applied which seemed to help a great deal.    His bleeding is very minimal at this time.  He would like this looked at to ensure that it does not appear infected.  His knee is somewhat red and swollen but he states that this he believes is normal.  He was seen at physical  therapy and they were able to look at it as well and did not have any concerns.    He continues to have a few drops of blood every hour from his incision site but it has slowed down a great deal.    Pain is well controlled.  He ran out of his Toradol yesterday and wonders about refilling that.  He is otherwise taking Tylenol and oxycodone.    12 point review of systems completed and negative except for what has been described above    History   Smoking Status     Never Smoker   Smokeless Tobacco     Never Used       Current Outpatient Prescriptions   Medication Sig     acetaminophen (TYLENOL) 500 MG tablet Take 2 tablets (1,000 mg total) by mouth 3 (three) times a day.     apixaban (ELIQUIS) 5 mg Tab tablet Take 5 mg by mouth 2 (two) times a day.     docusate sodium (COLACE) 100 MG capsule Take 1 capsule (100 mg total) by mouth 2 (two) times a day as needed for constipation.     metoprolol tartrate (LOPRESSOR) 100 MG tablet Take 1 tablet (100 mg total) by mouth 2 (two) times a day.     oxyCODONE (ROXICODONE) 5 MG immediate release tablet Take 1-2 tablets (5-10 mg total) by mouth every 4 (four) hours as needed.     polyethylene glycol (MIRALAX) 17 gram packet Take 1 packet (17 g total) by mouth daily as needed.     pramipexole (MIRAPEX) 0.5 MG tablet Take 0.5 mg by mouth at bedtime.     sildenafil (VIAGRA) 50 MG tablet Take 50 mg by mouth daily as needed for erectile dysfunction.     calcium-vitamin D (CALCIUM-VITAMIN D) 500 mg(1,250mg) -200 unit per tablet Take 1 tablet by mouth daily.     DOCOSAHEXANOIC ACID/EPA (FISH OIL ORAL) Take 600 mg by mouth daily.      multivitamin therapeutic tablet Take 1 tablet by mouth daily.         Objective:  Vitals:    05/11/18 1020 05/11/18 1038   BP: 122/88 120/86   Pulse: 72    Resp: 16    Temp: 98.3  F (36.8  C)    TempSrc: Oral    Weight: (!) 234 lb 1 oz (106.2 kg)    Height: 6' (1.829 m)        Vital signs reviewed and stable  General: No acute distress  Psych: Appropriate  affect  HEENT: moist mucous membranes, pupils equal, round, reactive to light and accomodation, posterior oropharynx clear of erythema or exudate, tympanic membranes are pearly grey bilaterally  Lymph: no cervical or supraclavicular lymphadenopathy  Cardiovascular: Irregularly irregular rhythm with normal rate  Pulmonary: clear to auscultation bilaterally with no wheeze  Abdomen: soft, non tender, non distended with normo-active bowel sounds  Extremities: warm and well perfused with no edema  Skin: warm and dry with no rash, well-healing incision in right knee.  One small drop of serosanguineous drainage noted.         This note has been dictated and transcribed using voice recognition software.   Any errors in transcription are unintentional and inherent to the software.

## 2021-06-17 NOTE — ANESTHESIA PROCEDURE NOTES
Peripheral Block    Patient location during procedure: pre-op  Start time: 5/3/2018 6:57 AM  End time: 5/3/2018 7:06 AM  post-op analgesia per surgeon order as noted in medical record  Staffing:  Performing  Anesthesiologist: ALEXANDRA ALAS  Preanesthetic Checklist  Completed: patient identified, site marked, risks, benefits, and alternatives discussed, timeout performed, consent obtained, at patient's request, airway assessed, oxygen available, suction available, emergency drugs available and hand hygiene performed  Peripheral Block  Block type: other, tibial  Prep: ChloraPrep  Patient position: supine  Patient monitoring: cardiac monitor, continuous pulse oximetry, blood pressure and heart rate  Laterality: right  Injection technique: ultrasound guided    Ultrasound used to visualize needle placement in proximity to nerve being blocked: yes   Permanent ultrasound image captured for medical record  Sterile gel and probe cover used for ultrasound.    Needle  Needle type: Stimuplex   Needle gauge: 21 G  Needle length: 4 in  no peripheral nerve catheter placed  Assessment  Injection assessment: negative aspiration for heme, no difficulty with injection, no paresthesia on injection and incremental injection

## 2021-06-17 NOTE — TELEPHONE ENCOUNTER
Telephone Encounter by Anna Epperson RN at 3/30/2021  8:54 AM     Author: Anna Epperson RN Service: -- Author Type: Registered Nurse    Filed: 3/30/2021  9:03 AM Encounter Date: 3/30/2021 Status: Signed    : Anna Epperson RN (Registered Nurse)       Medication list updated.  -----------------------------------  Rakel Joseph MD  You 1 hour ago (7:33 AM)     Lucas Castillo,   Please see question from patient below.  Patient is to continue the metoprolol that he had been on which was metoprolol succinate 200 mg daily and in addition add the diltiazem 120 mg daily. Thanks!    Message text      9

## 2021-06-17 NOTE — TELEPHONE ENCOUNTER
RN cannot approve Refill Request    RN can NOT refill this medication PCP messaged that patient is overdue for Office Visit. Last office visit: 12/4/2019 Vivi Miguel MD Last Physical: 9/18/2020 Last MTM visit: Visit date not found Last visit same specialty: 12/4/2019 Vivi Miguel MD.  Next visit within 3 mo: Visit date not found  Next physical within 3 mo: Visit date not found      Isamar JEANNE Miramontes, [facility] Triage/Med Refill 5/17/2021    Requested Prescriptions   Pending Prescriptions Disp Refills     pramipexole (MIRAPEX) 0.5 MG tablet [Pharmacy Med Name: Pramipexole Dihydrochloride 0.5 MG Oral Tablet] 135 tablet 0     Sig: TAKE 1 & 1/2 (ONE & ONE-HALF) TABLETS BY MOUTH ONCE DAILY AT BEDTIME       Parkinson's Meds I Refill Protocol Failed - 5/16/2021  7:55 PM        Failed - PCP or prescribing provider visit in past 6 months      Last office visit with prescriber/PCP: Visit date not found OR same dept: Visit date not found OR same specialty: 12/4/2019 Vivi Miguel MD Last physical: Visit date not found Last MTM visit: Visit date not found     Next appt within 3 mo: Visit date not found  Next physical within 3 mo: Visit date not found  Prescriber OR PCP: Vivi Miguel MD  Last diagnosis associated with med order: 1. Restless Legs Syndrome  - pramipexole (MIRAPEX) 0.5 MG tablet [Pharmacy Med Name: Pramipexole Dihydrochloride 0.5 MG Oral Tablet]; TAKE 1 & 1/2 (ONE & ONE-HALF) TABLETS BY MOUTH ONCE DAILY AT BEDTIME  Dispense: 135 tablet; Refill: 0    If protocol passes may refill for 6 months if within 3 months of last provider visit (or a total of 9 months).             Pramipexole/Ropinirole Refill Protocol Failed - 5/16/2021  7:55 PM        Failed - PCP or prescribing provider visit in past 6 months      Last office visit with prescriber/PCP: Visit date not found OR same dept: Visit date not found OR same specialty: 12/4/2019 Vivi Miguel MD Last  physical: Visit date not found Last MTM visit: Visit date not found         Next appt within 3 mo: Visit date not found  Next physical within 3 mo: Visit date not found  Prescriber OR PCP: Vivi Miguel MD  Last diagnosis associated with med order: 1. Restless Legs Syndrome  - pramipexole (MIRAPEX) 0.5 MG tablet [Pharmacy Med Name: Pramipexole Dihydrochloride 0.5 MG Oral Tablet]; TAKE 1 & 1/2 (ONE & ONE-HALF) TABLETS BY MOUTH ONCE DAILY AT BEDTIME  Dispense: 135 tablet; Refill: 0     If protocol passes may refill for 6 months if within 3 months of last provider visit (or a total of 9 months).

## 2021-06-17 NOTE — PROGRESS NOTES
Preoperative Exam    Scheduled Procedure: R knee replacement   Surgery Date:  5/3/18  Surgery Location: [facility], fax [phone]    Surgeon:  Dr. [facility]    Assessment/Plan:     1. Preoperative examination  - INR  - HM2(CBC w/o Differential)  - Comprehensive Metabolic Panel    2. Right knee pain    3. Cubital tunnel syndrome - PT exercises given, he will let me know if he would like a referral to PT/OT     Surgical Procedure Risk: Intermediate (reported cardiac risk generally 1-5%)  Have you had prior anesthesia?: Yes  Have you or any family members had a previous anesthesia reaction:  No  Do you or any family members have a history of a clotting or bleeding disorder?: No  Cardiac Risk Assessment: no increased risk for major cardiac complications    Patient approved for surgery with general or local anesthesia.    Please Note:  patient is on Apixaban anticoagulant for A Fib with a CHADS Vasc2 score of 2.  No bridging needed.  He is not at increased bleeding risk and thus wie will follow the guidelines of having him hold this for two days preoperatively.  I have reached out to the cardiologist and surgeon to ensure that Dr Birmingham is OK with this as well    Functional Status: Independent  Patient plans to recover at home with family.     Subjective:      Rogers Huffman is a 67 y.o. male who presents for a preoperative consultation.  He has been having issues with his right knee for almost a year.  He has had injections and done physical therapy without much symptomatic relief.      The patient is on apixaban for recently diagnosed atrial fibrillation.  He has a CHADS Vasc2 score of 2.  He is doing well with his current medication.  He is rate controlled with metoprolol      Otherwise, the patient mentions that recently has been noticing some numbness and tingling in his hands particularly in the fourth and fifth digits.  He notes this particularly when he rides his bike but it is getting worse.   He does not note any weakness.    All other systems reviewed and are negative, other than those listed in the HPI.    Pertinent History  Do you have difficulty breathing or chest pain after walking up a flight of stairs: No  History of obstructive sleep apnea: No  Steroid use in the last 6 months: No  Frequent Aspirin/NSAID use: No  Prior Blood Transfusion: No  Prior Blood Transfusion Reaction: No  If for some reason prior to, during or after the procedure, if it is medically indicated, would you be willing to have a blood transfusion?:  There is no transfusion refusal.    Current Outpatient Prescriptions   Medication Sig Dispense Refill     CALCIUM CARBONATE/VITAMIN D3 (CALCIUM 600 + D,3, ORAL) Take 1 tablet by mouth daily.       DOCOSAHEXANOIC ACID/EPA (FISH OIL ORAL) Take 1,200 mg by mouth daily.       ELIQUIS 5 mg Tab tablet TAKE 1 TABLET BY MOUTH TWICE DAILY 180 tablet 1     FOLIC ACID/MULTIVITS-MIN/LUT (CENTRUM SILVER ORAL) Take 1 tablet by mouth daily.       metoprolol tartrate (LOPRESSOR) 100 MG tablet Take 1 tablet (100 mg total) by mouth 2 (two) times a day.  0     pramipexole (MIRAPEX) 0.5 MG tablet Take 0.5 mg by mouth at bedtime.       sildenafil (VIAGRA) 50 MG tablet Take 50 mg by mouth daily as needed for erectile dysfunction.       No current facility-administered medications for this visit.         No Known Allergies    Patient Active Problem List   Diagnosis     Essential hypertension     Abnormal Blood Chemistry     Cellulitis     Abdominal Pain In The Right Lower Belly (RLQ)     Tinea Cruris     Restless Legs Syndrome     Rotator Cuff Tendonitis     Paroxysmal atrial fibrillation     Prostate cancer     Tachycardia       Past Medical History:   Diagnosis Date     Arthritis      Atrial fibrillation      Hypertension      Prostate cancer      Restless legs        Past Surgical History:   Procedure Laterality Date     ANTERIOR CRUCIATE LIGAMENT REPAIR Right      APPENDECTOMY  1969     [place]  "LAP,PROSTATECTOMY,RADICAL,W/NERVE SPARE,INCL ROBOTIC Bilateral 2018    Procedure: ROBOTIC ASSISTED RADICAL RETROPUBIC PROSTATECTOMY BILATERAL PELVIC LYMPH NODE DISSECTION LYSIS OF ADHESIONS;  Surgeon: Wm Koo MD;  Location: [facility];  Service: Urology     SHOULDER SURGERY Left      TONSILLECTOMY         Social History     Social History     Marital status:      Spouse name: N/A     Number of children: N/A     Years of education: N/A     Occupational History     Not on file.     Social History Main Topics     Smoking status: Never Smoker     Smokeless tobacco: Never Used     Alcohol use 0.6 oz/week     1 Glasses of wine per week     Drug use: No     Sexual activity: Not on file     Other Topics Concern     Not on file     Social History Narrative         Objective:     Vitals:    18 0919   BP: 104/72   Pulse: 84   Resp: 16   Temp: 97.9  F (36.6  C)   TempSrc: Oral   Weight: (!) 229 lb 6 oz (104 kg)   Height: 5' 11.5\" (1.816 m)         Physical Exam:  Objective:  Vital signs reviewed and stable  General: No acute distress  Psych: Appropriate affect  HEENT: moist mucous membranes, pupils equal, round, reactive to light and accomodation, posterior oropharynx clear of erythema or exudate, tympanic membranes are pearly grey bilaterally  Lymph: no cervical or supraclavicular lymphadenopathy  Cardiovascular: Regular rate with an irregularly irregular rhythm  with no murmur  Pulmonary: clear to auscultation bilaterally with no wheeze  Abdomen: soft, non tender, non distended with normo-active bowel sounds  Extremities: warm and well perfused with no edema  Skin: warm and dry with no rash      There are no Patient Instructions on file for this visit.    EK2018 - sinus rhythm with occasional PVC - rate 69bpm    Labs:  Recent Results (from the past 120 hour(s))   INR    Collection Time: 18 10:00 AM   Result Value Ref Range    INR 1.07 0.90 - 1.10   HM2(CBC w/o Differential) "    Collection Time: 04/16/18 10:00 AM   Result Value Ref Range    WBC 7.4 4.0 - 11.0 thou/uL    RBC 5.29 4.40 - 6.20 mill/uL    Hemoglobin 16.2 14.0 - 18.0 g/dL    Hematocrit 47.5 40.0 - 54.0 %    MCV 90 80 - 100 fL    MCH 30.7 27.0 - 34.0 pg    MCHC 34.2 32.0 - 36.0 g/dL    RDW 11.8 11.0 - 14.5 %    Platelets 192 140 - 440 thou/uL    MPV 7.3 7.0 - 10.0 fL   Comprehensive Metabolic Panel    Collection Time: 04/16/18 10:00 AM   Result Value Ref Range    Sodium 139 136 - 145 mmol/L    Potassium 4.7 3.5 - 5.0 mmol/L    Chloride 105 98 - 107 mmol/L    CO2 24 22 - 31 mmol/L    Anion Gap, Calculation 10 5 - 18 mmol/L    Glucose 72 70 - 125 mg/dL    BUN 15 8 - 22 mg/dL    Creatinine 0.93 0.70 - 1.30 mg/dL    GFR MDRD Af Amer >60 >60 mL/min/1.73m2    GFR MDRD Non Af Amer >60 >60 mL/min/1.73m2    Bilirubin, Total 0.7 0.0 - 1.0 mg/dL    Calcium 9.8 8.5 - 10.5 mg/dL    Protein, Total 6.6 6.0 - 8.0 g/dL    Albumin 3.9 3.5 - 5.0 g/dL    Alkaline Phosphatase 125 (H) 45 - 120 U/L    AST 24 0 - 40 U/L    ALT 25 0 - 45 U/L       Immunization History   Administered Date(s) Administered     DT (pediatric) 06/11/2003     Influenza high dose, seasonal 09/26/2017     Influenza, inj, historic,unspecified 11/18/2015, 10/17/2016     Influenza,seasonal quad, PF 01/07/2015     Pneumo Conj 13-V (2010&after) 06/04/2015     Pneumo Polysac 23-V 09/26/2017     Td,adult,historic,unspecified 06/11/2003     Tdap 05/09/2009     ZOSTER, LIVE 11/18/2015           Electronically signed by Vivi Miguel MD 04/19/18 9:15 AM

## 2021-06-17 NOTE — ANESTHESIA PROCEDURE NOTES
Peripheral Block    Patient location during procedure: pre-op  Start time: 5/3/2018 7:06 AM  End time: 5/3/2018 7:10 AM  post-op analgesia per surgeon order as noted in medical record  Staffing:  Performing  Anesthesiologist: ALEXANDRA ALAS  Preanesthetic Checklist  Completed: patient identified, site marked, risks, benefits, and alternatives discussed, timeout performed, consent obtained, at patient's request, airway assessed, oxygen available, suction available, emergency drugs available and hand hygiene performed  Peripheral Block  Block type: saphenous  Prep: ChloraPrep  Patient position: supine  Patient monitoring: cardiac monitor, continuous pulse oximetry, blood pressure and heart rate  Laterality: right  Injection technique: ultrasound guided    Ultrasound used to visualize needle placement in proximity to nerve being blocked: yes   Permanent ultrasound image captured for medical record  Sterile gel and probe cover used for ultrasound.    Needle  Needle type: Stimuplex   Needle gauge: 21 G  Needle length: 4 in  no peripheral nerve catheter placed  Assessment  Injection assessment: negative aspiration for heme, no difficulty with injection, no paresthesia on injection and incremental injection

## 2021-06-17 NOTE — ANESTHESIA PROCEDURE NOTES
Spinal Block    Patient location during procedure: OR  Start time: 5/3/2018 7:34 AM  End time: 5/3/2018 7:38 AM  Reason for block: primary anesthetic    Staffing:  Performing  Anesthesiologist: ALEXANDRA ALAS    Preanesthetic Checklist  Completed: patient identified, risks, benefits, and alternatives discussed, timeout performed, consent obtained, airway assessed, oxygen available, suction available, emergency drugs available and hand hygiene performed  Spinal Block  Patient position: sitting  Prep: ChloraPrep and site prepped and draped  Patient monitoring: blood pressure, continuous pulse ox, cardiac monitor and heart rate  Approach: midline  Location: L3-4  Injection technique: single-shot  Needle type: pencil-tip   Needle gauge: 24 G

## 2021-06-17 NOTE — ANESTHESIA CARE TRANSFER NOTE
Last vitals:   Vitals:    05/03/18 0959   BP: 137/88   Pulse: 78   Resp: 14   Temp: 37.3  C (99.2  F)   SpO2: 97%     Patient's level of consciousness is drowsy  Spontaneous respirations: yes  Maintains airway independently: yes  Dentition unchanged: yes  Oropharynx: oropharynx clear of all foreign objects    QCDR Measures:  ASA# 20 - Surgical Safety Checklist: WHO surgical safety checklist completed prior to induction  PQRS# 430 - Adult PONV Prevention: 4558F - Pt received => 2 anti-emetic agents (different classes) preop & intraop  ASA# 8 - Peds PONV Prevention: NA - Not pediatric patient, not GA or 2 or more risk factors NOT present  PQRS# 424 - Chichi-op Temp Management: 4559F - At least one body temp DOCUMENTED => 35.5C or 95.9F within required timeframe  PQRS# 426 - PACU Transfer Protocol: - Transfer of care checklist used  ASA# 14 - Acute Post-op Pain: ASA14B - Patient did NOT experience pain >= 7 out of 10

## 2021-06-17 NOTE — ANESTHESIA POSTPROCEDURE EVALUATION
Patient: Rogers Huffman  RIGHT TOTAL KNEE ARTHROPLASTY, COMPUTER-ASSIST, HARDWARE REMOVAL RIGHT KNEE, LATERAL RELEASE  Anesthesia type: spinal    Patient location: PACU  Last vitals:   Vitals:    05/03/18 1200   BP: 127/82   Pulse: 83   Resp: 16   Temp: 36.6  C (97.8  F)   SpO2: 96%     Post vital signs: stable  Level of consciousness: awake and responds to simple questions  Post-anesthesia pain: pain controlled  Post-anesthesia nausea and vomiting: no  Pulmonary: unassisted, return to baseline  Cardiovascular: stable and blood pressure at baseline  Hydration: adequate  Anesthetic events: no    QCDR Measures:  ASA# 11 - [place]-op Cardiac Arrest: ASA11B - Patient did NOT experience unanticipated cardiac arrest  ASA# 12 - [place]-op Mortality Rate: ASA12B - Patient did NOT die  ASA# 13 - PACU Re-Intubation Rate: NA - No ETT / LMA used for case  ASA# 10 - Composite Anes Safety: ASA10A - No serious adverse event    Additional Notes: doing well, no complaints.

## 2021-06-18 NOTE — PATIENT INSTRUCTIONS - HE
Patient Instructions by Vivi Miguel MD at 9/18/2020  8:40 AM     Author: Vivi Miguel MD Service: -- Author Type: Physician    Filed: 9/18/2020  8:55 AM Encounter Date: 9/18/2020 Status: Signed    : Vivi Miguel MD (Physician)         Patient Education   Alcohol Use   Many people can enjoy a glass of wine or beer without any negative consequences to their health. According to the Centers for Disease Control and Prevention (CDC), having one or fewer drinks per day for women and two or fewer per day for men is considered moderate drinking.     When people drink more than moderately, it can become concerning. Excessive drinking is defined as consuming 15 drinks or more per week for men and 8 drinks or more per week for women. There are various health problems associated with excessive drinking, which include:    Damage to vital organs like the heart, brain, liver and pancreas    Harm to the digestive tract    Weaken the immune system    Higher risk for heart disease and cancer  There are many resources available to people who are addicted to alcohol. A counselor or other health care provider can give you support. So can a , , or rabbi who is trained in substance abuse counseling. Friends and family may also help once you are connected with professionals.      Patient Education   Signs of Hearing Loss  Hearing loss is a problem shared by many people. In fact, it is one of the most common health conditions, particularly as people age. Most people over age 65 have some hearing loss, and by age 80, almost everyone does. Because hearing loss usually occurs slowly over the years, you may not realize your hearing ability has gotten worse.       Have your hearing checked  Contact your [facility] care provider if you:    Have to strain to hear normal conversation.    Have to watch other peoples faces very carefully to follow what theyre saying.    Need to ask people to repeat  what theyve said.    Often misunderstand what people are saying.    Turn the volume of the television or radio up so high that others complain.    Feel that people are mumbling when theyre talking to you.    Find that the effort to hear leaves you feeling tired and irritated.    Notice, when using the phone, that you hear better with 1 ear than the other.    8044-4404 [employer]. [address] [place] [address], [place] [address]. All rights reserved. This information is not intended as a substitute for professional medical care. Always follow your healthcare professional's instructions.           Advance Directive  Patients advance directive was discussed and I am comfortable with the patients wishes.  Patient Education   Personalized Prevention Plan  You are due for the preventive services outlined below.  Your care team is available to assist you in scheduling these services.  If you have already completed any of these items, please share that information with your care team to update in your medical record.  Health Maintenance   Topic Date Due   ? HEPATITIS C SCREENING  1950   ? ZOSTER VACCINES (2 of 3) 01/13/2016   ? MEDICARE ANNUAL WELLNESS VISIT  06/19/2020   ? FALL RISK ASSESSMENT  06/19/2020   ? LIPID  06/19/2024   ? ADVANCE CARE PLANNING  06/19/2024   ? TD 18+ HE  06/19/2029   ? COLORECTAL CANCER SCREENING  09/26/2029   ? PNEUMOCOCCAL IMMUNIZATION 65+ LOW/MEDIUM RISK  Completed   ? INFLUENZA VACCINE RULE BASED  Completed   ? HEPATITIS B VACCINES  Aged Out

## 2021-06-19 NOTE — LETTER
Letter by Rakel Joseph MD at      Author: Rakel Joseph MD Service: -- Author Type: --    Filed:  Encounter Date: 8/27/2019 Status: (Other)         Rogers Huffman  [address]  [place] [address]      August 27, 2019      Dear Rogers,    This letter is to remind you that you will be due for your follow up appointment with Dr. Kaiser Joseph . To help ensure you are in the best health possible, a regular follow-up with your cardiologist is essential.     Please call our Patient Scheduling Line at [phone] to schedule your appointment at your earliest convenience.  If you have recently scheduled an appointment, please disregard this letter.    We look forward to seeing you again. As always, we are available at the number  above for any questions or concerns you may have.      Sincerely,     The Physicians and Staff of [facility]

## 2021-06-23 NOTE — TELEPHONE ENCOUNTER
RN cannot approve Refill Request    RN can NOT refill this medication Protocol failed and NO refill given and historical medication requested.     Last office visit: 5/11/2018 Vivi Miguel MD Last Physical: 4/16/2018 Last MTM visit: Visit date not found Last visit same specialty: 5/11/2018 Vivi Miguel MD.  Next visit within 3 mo: Visit date not found  Next physical within 3 mo: Visit date not found      Chacho Burrell, [facility] Triage/Med Refill 1/17/2019    Requested Prescriptions   Pending Prescriptions Disp Refills     apixaban (ELIQUIS) 5 mg Tab tablet 180 tablet 1     Sig: Take 1 tablet (5 mg total) by mouth 2 (two) times a day.    Apixaban/Rivaroxaban/Dabigatran Refill Protocol Failed - 1/17/2019  8:48 AM       Failed - Route to appropriate pool/provider    Last Anticoagulation Summary:          Passed - Renal function test in last year    Creatinine   Date Value Ref Range Status   04/16/2018 0.93 0.70 - 1.30 mg/dL Final            Passed - PCP or prescribing provider visit in past 12 months      Last office visit with prescriber/PCP: 5/11/2018 Vivi Miguel MD OR same dept: 5/11/2018 Vivi Miguel MD OR same specialty: 5/11/2018 Vivi Miguel MD  Last physical: 4/16/2018 Last MTM visit: Visit date not found   Next visit within 3 mo: Visit date not found  Next physical within 3 mo: Visit date not found  Prescriber OR PCP: Vivi Miguel MD  Last diagnosis associated with med order: 1. Paroxysmal atrial fibrillation (H)  - apixaban (ELIQUIS) 5 mg Tab tablet; Take 1 tablet (5 mg total) by mouth 2 (two) times a day.  Dispense: 180 tablet; Refill: 1    If protocol passes may refill for 12 months if within 3 months of last provider visit (or a total of 15 months).

## 2021-06-26 NOTE — PROGRESS NOTES
Progress Notes by Rakel Joseph MD at 1/26/2018 12:50 PM     Author: Rakel Joseph MD Service: -- Author Type: Physician    Filed: 1/26/2018  1:21 PM Encounter Date: 1/26/2018 Status: Signed    : Rakel Joseph MD (Physician)                [email or web address]  [phone]          Thank you Dr. Miguel for asking the [facility] Heart Care team to participate in the care of your patient, Rogers Huffman.     Impression and Plan     1.  Atrial fibrillation.  Patient with newly diagnosed atrial fibrillation of uncertain duration and frequency.  Ventricular response appears well-controlled on ECG and on clinical examination.  Twelve-lead ECG today confirms conversion to sinus rhythm. Patient is completely unaware of the rhythm disturbance.  Rogers [id number] Score is 2 yielding an annual embolic risk of 2.2-2.9%.  Given he is unaware of the rhythm disturbance I would advocate consideration of full anticoagulation after he has his prostate surgery.  I did discuss this with Rogers and he would like to think about this some more as well. Recommended:    Continue metoprolol.    Would advocate initiation of aspirin for some degree of CVA prophylaxis after his prostate surgery.    Echocardiogram to rule out any significant structural heart disease.    TSH for completeness.    Would advocate proceeding with prostate surgery if echocardiogram unremarkable with initiation of   aspirin postoperatively when felt reasonable.    We will plan on arranging follow-up in approximately 6 weeks and at that time we will readdress whether to pursue full anticoagulation.    2.  Hypertension.  Blood pressure is fairly reasonable in the office today at 130/82 mmHg.    As aforementioned, will plan on follow-up in approximately 6 weeks.           History of Present Illness    Once again I would like to thank you again for asking me to participate in the care of your patient, Rogers Huffman.  As you know,  "but to reiterate for my own records, Rogers Huffman is a 67 y.o. male who was recently seen in the clinic for preoperative evaluation in anticipation of prostate surgery.  His ECG revealed evidence of atrial fibrillation.  Patient, however, was completely unaware of the rhythm disturbance.    On further interview, patient has been told in the past that he has had some irregular in his heart rhythm though has never been actually diagnosed with atrial fibrillation.  Patient reports no concerning cardiac symptoms.  He specifically denies chest pain, shortness of breath, or any subjective decline in exercise tolerance other than that related to some knee discomfort.    Further review of systems is otherwise negative/noncontributory (medical record and 13 point review of systems reviewed as well and pertinent positives noted).         Cardiac Diagnostics     12-lead ECG (personally reviewed) 26 January 2018: Sinus rhythm with heart rate of 69 bpm.  Solitary PVC.    12-lead ECG (personally reviewed) 24 January 2018: Atrial fibrillation.  Cannot rule out inferior infarction.           Physical Examination       /82 (Patient Site: Right Arm, Patient Position: Sitting, Cuff Size: Adult Large)  Pulse 66  Resp 18  Ht 5' 11\" (1.803 m)  Wt (!) 224 lb (101.6 kg)  BMI 31.24 kg/m2        Wt Readings from Last 3 Encounters:   01/26/18 (!) 224 lb (101.6 kg)   01/24/18 (!) 226 lb (102.5 kg)   09/26/17 217 lb 8 oz (98.7 kg)     The patient is alert and oriented times three. Sclerae are anicteric. Mucosal membranes are moist. Jugular venous pressure is normal. No significant adenopathy/thyromegally appreciated. Lungs are clear with good expansion. On cardiovascular exam, the patient has a regular S1 and S2. Abdomen is soft and non-tender. Extremities reveal no clubbing, cyanosis, or edema.         Family History/Social History/Risk Factors     Patient does not smoke.  Patient reports no early onset coronary disease in his " immediate family.  He is retired.  He had been a  at  prior to group home.  S/P her time he enjoys spending time with his family and also doing remodeling on his home.         Medications  Allergies   Current Outpatient Prescriptions   Medication Sig Dispense Refill   ? aspirin 81 mg chewable tablet Chew 81 mg daily.     ? CALCIUM CARBONATE/VITAMIN D3 (CALCIUM 600 + D,3, ORAL) Take 1 tablet by mouth daily.     ? DOCOSAHEXANOIC ACID/EPA (FISH OIL ORAL) Take 1,200 mg by mouth daily.     ? FOLIC ACID/MULTIVITS-MIN/LUT (CENTRUM SILVER ORAL) Take 1 tablet by mouth daily.     ? hydroCHLOROthiazide (HYDRODIURIL) 25 MG tablet Take 1 tablet (25 mg total) by mouth daily. 30 tablet 1   ? metoprolol succinate (TOPROL XL) 50 MG 24 hr tablet Take 1 tablet (50 mg total) by mouth daily. 90 tablet 3   ? pramipexole (MIRAPEX) 0.5 MG tablet TAKE 1 TABLET (0.5 MG TOTAL) BY MOUTH BEDTIME. 90 tablet 2     No current facility-administered medications for this visit.       No Known Allergies       Lab Results   Lab Results   Component Value Date     01/24/2018    K 5.2 (H) 01/24/2018     01/24/2018    CO2 28 01/24/2018    BUN 17 01/24/2018    CREATININE 1.05 01/24/2018    CALCIUM 9.9 01/24/2018     Lab Results   Component Value Date    WBC 9.5 12/19/2016    WBC 9.8 08/05/2015    HGB 15.5 01/24/2018    HCT 46.9 12/19/2016    MCV 91 12/19/2016     12/19/2016     Lab Results   Component Value Date    CHOL 167 09/26/2017    TRIG 143 09/26/2017    HDL 40 09/26/2017    LDLCALC 98 09/26/2017           Medical History  Surgical History   Past Medical History:   Diagnosis Date   ? Arthritis    ? Hypertension    ? Prostate cancer    ? Restless legs       Past Surgical History:   Procedure Laterality Date   ? ANTERIOR CRUCIATE LIGAMENT REPAIR Right    ? APPENDECTOMY     ? SHOULDER SURGERY Left    ? TONSILLECTOMY

## 2021-06-26 NOTE — PROGRESS NOTES
Progress Notes by Rakel Joseph MD at 10/25/2018  8:10 AM     Author: Rakel Joseph MD Service: -- Author Type: Physician    Filed: 10/25/2018  8:35 AM Encounter Date: 10/25/2018 Status: Signed    : Rakel Joseph MD (Physician)                  [email or web address]  [phone]         Thank you Dr. Miguel for asking the [facility] Heart Care team to participate in the care of your patient, Rogers Huffman.     Impression and Plan     1.  Atrial fibrillation (suspected paroxysmal).  Patient with diagnosis of atrial fibrillation earlier this year of uncertain duration and frequency. Holter monitor 2 March 2018 revealed some mild tendency toward increased ventricular response. His metoprolol was increased from 50 mg twice a day to 100 mg twice a day. Follow-up Holter monitor 6 April 2018 revealed well controlled ventricular response. Patient is completely unaware of the rhythm disturbance.     I suspect that his atrial fibrillation is indeed paroxysmal.  This is based on the fact that he has a Fitbit device that does show some intermittent changes in his heart rhythm and rate, albeit mild.  Today we did discuss continued options for his atrial fibrillation.  Specifically, we discussed simply continue to pursue a rate control strategy particularly since it would seem that his atrial fibrillation is indeed paroxysmal.  We could be more aggressive with antiarrhythmic therapy and the like though we discussed the potential side effects of such medication and such.  Patient at this time continues to report that he is essentially completely asymptomatic with the rhythm disturbance and would rather simply continue with the current management which I feel is reasonable.     Rogers [id number] Score is 2 yielding an annual embolic risk of 2.2-2.9%. Rogers was started on apixaban (Eliquis ) for CVA prophylaxis at time of original diagnosis. Recommended:    Continue metoprolol 100 mg twice a  day    Continue apixaban (Eliquis ) for CVA prophylaxis.    2.  Hypertension.  Blood pressure is reasonable in the office today at 106/82 mmHg.     Plan on follow-up in 1 year.           History of Present Illness    Once again I would like to thank you again for asking me to participate in the care of your patient, Rogers Huffman.  As you know, but to reiterate for my own records, Rogers Huffman is a 68 y.o. male who had initially been seen in the clinic for preoperative evaluation in anticipation of prostate surgery.  His ECG revealed evidence of atrial fibrillation.  Patient, however, was completely unaware of the rhythm disturbance.     At time of initial consultation, Rogers did indicate that he had been told in the past that he has had some irregular in his heart rhythm though has never been actually diagnosed with atrial fibrillation.  Patient reports no concerning cardiac symptoms.  He specifically denies chest pain, shortness of breath, or any subjective decline in exercise tolerance.  He remains essentially completely asymptomatic with the rhythm disturbance.     Rogers has been maintained on apixaban (Eliquis ) for CVA prophylaxis.    Further review of systems is otherwise negative/noncontributory (medical record and 13 point review of systems reviewed as well and pertinent positives noted).         Cardiac Diagnostics      Echocardiogram 30 January 2018:  1. Normal left ventricular size and systolic performance with ejection fraction of 60-65%.  2. No significant valvular heart disease.  3. Normal right ventricular size and systolic performance.  4. Moderate left atrial enlargement. Mild right atrial enlargement.    Holter monitor 6 April 2018:  1. Abnormal Holter monitor tracing by virtue of the persistent atrial fibrillation demonstrated throughout the recording interval.  2. The ventricular response appears to be well controlled.  3. No symptomatic recordings therefore correlation with patient's  clinical events cannot be made.  4. No significant ventricular arrhythmia  5. No profound bradycardia or pauses    Holter monitor 2 March 2018:  1. Persistent atrial fibrillation with some mild tendency towards rapid ventricular response during daytime hours.    12-lead ECG (personally reviewed) 26 January 2018: Sinus rhythm with heart rate of 69 bpm.  Solitary PVC.     12-lead ECG (personally reviewed) 24 January 2018: Atrial fibrillation.  Cannot rule out inferior infarction.           Physical Examination       /82 (Patient Site: Right Arm, Patient Position: Sitting, Cuff Size: Adult Large)  Pulse 80  Resp 16  Ht 6' (1.829 m)  Wt (!) 223 lb (101.2 kg)  BMI 30.24 kg/m2        Wt Readings from Last 3 Encounters:   10/25/18 (!) 223 lb (101.2 kg)   05/11/18 (!) 234 lb 1 oz (106.2 kg)   05/03/18 (!) 225 lb (102.1 kg)     The patient is alert and oriented times three. Sclerae are anicteric. Mucosal membranes are moist. Jugular venous pressure is normal. No significant adenopathy/thyromegally appreciated. Lungs are clear with good expansion. On cardiovascular exam, the patient has an irregular S1 and S2. Abdomen is soft and non-tender. Extremities reveal no clubbing, cyanosis, or edema.         Family History/Social History/Risk Factors   Patient does not smoke.  Family history of hypertension.         Medications  Allergies   Current Outpatient Prescriptions   Medication Sig Dispense Refill   ? calcium-vitamin D (CALCIUM-VITAMIN D) 500 mg(1,250mg) -200 unit per tablet Take 1 tablet by mouth daily.     ? DOCOSAHEXANOIC ACID/EPA (FISH OIL ORAL) Take 600 mg by mouth daily.      ? ELIQUIS 5 mg Tab tablet TAKE 1 TABLET BY MOUTH TWICE DAILY 180 tablet 1   ? metoprolol tartrate (LOPRESSOR) 100 MG tablet Take 1 tablet (100 mg total) by mouth 2 (two) times a day. 180 tablet 3   ? multivitamin therapeutic tablet Take 1 tablet by mouth daily.     ? pramipexole (MIRAPEX) 0.5 MG tablet TAKE ONE TABLET BY MOUTH ONCE DAILY  AT BEDTIME 90 tablet 2   ? sildenafil (VIAGRA) 50 MG tablet Take 100 mg by mouth every other day.      ? acetaminophen (TYLENOL) 500 MG tablet Take 2 tablets (1,000 mg total) by mouth 3 (three) times a day.  0   ? apixaban (ELIQUIS) 5 mg Tab tablet Take 5 mg by mouth 2 (two) times a day.     ? docusate sodium (COLACE) 100 MG capsule Take 1 capsule (100 mg total) by mouth 2 (two) times a day as needed for constipation.  0   ? oxyCODONE (ROXICODONE) 5 MG immediate release tablet Take 1-2 tablets (5-10 mg total) by mouth every 4 (four) hours as needed. 50 tablet 0   ? polyethylene glycol (MIRALAX) 17 gram packet Take 1 packet (17 g total) by mouth daily as needed.  0   ? pramipexole (MIRAPEX) 0.5 MG tablet Take 0.5 mg by mouth at bedtime.       No current facility-administered medications for this visit.       No Known Allergies       Lab Results   Lab Results   Component Value Date     04/16/2018    K 4.3 05/04/2018     04/16/2018    CO2 24 04/16/2018    BUN 15 04/16/2018    CREATININE 0.93 04/16/2018    CALCIUM 9.8 04/16/2018     Lab Results   Component Value Date    WBC 7.4 04/16/2018    WBC 9.8 08/05/2015    HGB 12.8 (L) 05/04/2018    HCT 47.5 04/16/2018    MCV 90 04/16/2018     04/16/2018     Lab Results   Component Value Date    CHOL 167 09/26/2017    TRIG 143 09/26/2017    HDL 40 09/26/2017    LDLCALC 98 09/26/2017     Lab Results   Component Value Date    INR 1.12 (H) 05/03/2018     Lab Results   Component Value Date    TSH 1.55 01/26/2018

## 2021-06-26 NOTE — PROGRESS NOTES
Progress Notes by Rakel Joseph MD at 3/19/2018  8:10 AM     Author: Rakel Joseph MD Service: -- Author Type: Physician    Filed: 3/19/2018  8:30 AM Encounter Date: 3/19/2018 Status: Signed    : Rakel Joseph MD (Physician)                  [email or web address]  [phone]         Thank you Dr. Miguel for asking the [facility] team to participate in the care of your patient, Rogers Huffman.     Impression and Plan     1.  Atrial fibrillation.  Patient with newly diagnosed atrial fibrillation of uncertain duration and frequency.  Holter monitor earlier this month revealed some mild tendency toward increased ventricular response.  His metoprolol was increased from 50 mg twice a day to 75 mg twice a day.  Patient has been monitoring his heart rate on a fit bit type device and is still having some tendency toward high readings and his heart rate is in the mid 90s at rest today.  I suspect he could benefit from a higher dose of the metoprolol as a consequence.Patient is completely unaware of the rhythm disturbance.  Rogers [id number] Score is 2 yielding an annual embolic risk of 2.2-2.9%.  Rogers was recently started on apixaban (Eliquis ) for CVA prophylaxis. Recommended:    Continue metoprolol, but increase to 100 mg twice a day    Continue apixaban (Eliquis ).    Repeat Holter monitor in approximately 2-3 weeks to reassess ventricular response.     2.  Hypertension.  Blood pressure is somewhat elevated in the office today.    Increase metoprolol as per problem #1.    Follow-up pending Holter monitor results.         History of Present Illness    Once again I would like to thank you again for asking me to participate in the care of your patient, Rogers Huffman.  As you know, but to reiterate for my own records, Rogers Huffman is a 67 y.o. male who was recently seen in the clinic for preoperative evaluation in anticipation of prostate surgery.  His ECG revealed  "evidence of atrial fibrillation.  Patient, however, was completely unaware of the rhythm disturbance.     At time of initial consultation, Rogers did indicate that he had been told in the past that he has had some irregular in his heart rhythm though has never been actually diagnosed with atrial fibrillation.  Patient reports no concerning cardiac symptoms.  He specifically denies chest pain, shortness of breath, or any subjective decline in exercise tolerance other than that related to some knee discomfort.    Rogers was recently started on apixaban (Eliquis ) for CVA prophylaxis.    Further review of systems is otherwise negative/noncontributory (medical record and 13 point review of systems reviewed as well and pertinent positives noted).         Cardiac Diagnostics      Echocardiogram 30 January 2018:  1. Normal left ventricular size and systolic performance with ejection fraction of 60-65%.  2. No significant valvular heart disease.  3. Normal right ventricular size and systolic performance.  4. Moderate left atrial enlargement.  Mild right atrial enlargement.    Holter monitor March 2018:  1. Persistent atrial fibrillation with some mild tendency towards rapid ventricular response during daytime hours.     12-lead ECG (personally reviewed) 26 January 2018: Sinus rhythm with heart rate of 69 bpm.  Solitary PVC.     12-lead ECG (personally reviewed) 24 January 2018: Atrial fibrillation.  Cannot rule out inferior infarction.         Physical Examination       BP (!) 142/104 (Patient Site: Left Arm, Patient Position: Sitting, Cuff Size: Adult Large)  Pulse 92  Resp 16  Ht 5' 11.5\" (1.816 m)  Wt (!) 225 lb (102.1 kg)  BMI 30.94 kg/m2        Wt Readings from Last 3 Encounters:   03/19/18 (!) 225 lb (102.1 kg)   02/07/18 (!) 224 lb 9.6 oz (101.9 kg)   01/30/18 (!) 224 lb (101.6 kg)       The patient is alert and oriented times three. Sclerae are anicteric. Mucosal membranes are moist. Jugular venous pressure is " normal. No significant adenopathy/thyromegally appreciated. Lungs are clear with good expansion. On cardiovascular exam, the patient has an irregular S1 and S2. Abdomen is soft and non-tender. Extremities reveal no clubbing, cyanosis, or edema.           Family History/Social History/Risk Factors   Patient does not smoke.  Family history of hypertension.         Medications  Allergies   Current Outpatient Prescriptions   Medication Sig Dispense Refill   ? apixaban (ELIQUIS) 5 mg Tab tablet Take 1 tablet (5 mg total) by mouth 2 (two) times a day. 60 tablet 0   ? CALCIUM CARBONATE/VITAMIN D3 (CALCIUM 600 + D,3, ORAL) Take 1 tablet by mouth daily.     ? DOCOSAHEXANOIC ACID/EPA (FISH OIL ORAL) Take 1,200 mg by mouth daily.     ? FOLIC ACID/MULTIVITS-MIN/LUT (CENTRUM SILVER ORAL) Take 1 tablet by mouth daily.     ? oxyCODONE-acetaminophen (PERCOCET) 5-325 mg per tablet Take 1-2 tablets by mouth every 4 (four) hours as needed for pain. 30 tablet 0   ? pramipexole (MIRAPEX) 0.5 MG tablet Take 0.5 mg by mouth at bedtime.     ? metoprolol tartrate (LOPRESSOR) 100 MG tablet Take 1 tablet (100 mg total) by mouth 2 (two) times a day.  0     No current facility-administered medications for this visit.       No Known Allergies       Lab Results   Lab Results   Component Value Date     02/06/2018    K 4.6 02/06/2018     02/06/2018    CO2 29 02/06/2018    BUN 14 02/06/2018    CREATININE 1.05 02/06/2018    CALCIUM 10.1 02/06/2018     Lab Results   Component Value Date    WBC 7.8 02/06/2018    WBC 9.8 08/05/2015    HGB 13.6 (L) 02/07/2018    HCT 46.6 02/06/2018    MCV 88 02/06/2018     02/06/2018     Lab Results   Component Value Date    CHOL 167 09/26/2017    TRIG 143 09/26/2017    HDL 40 09/26/2017    LDLCALC 98 09/26/2017     Lab Results   Component Value Date    TSH 1.55 01/26/2018

## 2021-06-28 NOTE — PROGRESS NOTES
Progress Notes by Behr-Holewinski, Sierra, RN at 11/20/2019  8:00 AM     Author: Behr-Holewinski, Sierra, RN Service: -- Author Type: Patient Access    Filed: 11/20/2019  9:34 AM Encounter Date: 11/20/2019 Status: Signed    : Behr-Holewinski, Sierra, RN (Registered Nurse)            Zestar Study Consent Visit    Study description: ECG and PPG Study: Zestar Study      Note time seated: 0807     Rogers Huffman a 69 y.o. male , was seen in [facility] today to discuss participation in the Zestar study.   The consent discussion began on 11/14/19.  Please refer to phone call note from Rosa Tavera for more details.  The consent form was reviewed with the patient.     The review of the study included:    Study purpose     Conflict of interest    Device description      Study visits    Risks of participation    Benefits (if any)    Alternatives    Voluntary participation    Confidentiality     Compensation/costs of participation    Study stipends    Injury and legal rights    The subject was provided time to review the consent form and consider participation. his questions were answered to his satisfaction.   The patient has voluntarily agreed to participate in the above noted study.     The consent form version 12 Nov 2019 and HIPAA form version 11 June 2019 was signed 11/20/19 at 0810    The subject was provided with a copy of the consent form and HIPAA. A copy of the signed forms was forwarded to medical records.    No study procedures were done prior to Rogers Huffman providing informed consent.     Sierra Behr-Holewinski    Subject Restrictions During Study -Confirmed with Subject prior to any study procedures completed    Restrictions on jewelry, recreational drugs, caffeine, and exercise few days prior and during study.   1. Subjects should not consume excessive amount of caffeine (6 or more 8-oz cups of coffee, or more than 570 mg of caffeine from energy drinks, pills or similar substance) during their participation in  the study.   2. Subjects should not consume excessive amount of alcohol for the duration of their participation in the study. A typical moderate amount is allowed during stage 3.   3. Subjects should not take any recreational drugs (including, but not limited to methamphetamines, cocaine, opioids, cannabis, LSD) for the duration of their participation in the study.   4. Subjects should not wear underwire bra or jewelry during the in-lab study (to not interfere with electrode placement and ECG data recordings).   5. Subject will not be permitted to have their cell phone or any electronic recording device on or with them during the in-lab test session(s).   6. Subjects under 22 years old will not be permitted to take ECG recordings through the ECG yanna on the wrist-worn devices.     For study stage 3 only   1. Subjects should only do high intensity exercise (e.g. sprinting, heavy lifting, etc.) in the morning upon awakening or else not at all   2. Subjects should abstain from swimming during the time of the study   3. Subjects should only shower in the morning upon awakening (or else not at all)   4. Female subjects are strongly suggested to wear non-underwire bras throughout this stage of the study     Sierra BehrRina      Study Data collections   Vitals  (TPBP)     Vitals:    11/20/19 0822 11/20/19 0838 11/20/19 0845 11/20/19 0846   BP: (!) 142/105 (!) 134/101 139/90 (!) 138/105   Patient Site: Left Arm Left Arm Left Arm Left Arm   Patient Position: Sitting Sitting Sitting Sitting   Cuff Size: Adult Regular Adult Regular Adult Regular Adult Regular   Pulse: 77 75 78 (!) 102   Resp:       Temp:       TempSrc:          VS taken after 5 min rest     MAP 1    113  MAP 2      120   MAP 3             113     Subject did not meet Exclusion criteria due to blood pressures. Said that he was going to talk to Cardiologist and get medications figured out and then contact us back to try again.      Asha  Behr-Holewinski

## 2021-06-28 NOTE — PROGRESS NOTES
Progress Notes by Behr-Holewinski, Sierra, RN at 11/18/2019  8:00 AM     Author: Behr-Holewinski, Sierra, RN Service: -- Author Type: Patient Access    Filed: 11/18/2019  9:00 AM Encounter Date: 11/18/2019 Status: Signed    : Behr-Holewinski, Sierra, RN (Registered Nurse)            Zestar Study Consent Visit    Study description: ECG and PPG Study: Zestar Study      Note time seated: 0759     Rogers Huffman a 69 y.o. male , was seen in [facility] today to discuss participation in the Zestar study.   The consent discussion began on 11/14/19.  Please refer to phone call note from Rosa Tavera for more details.  The consent form was reviewed with the patient.     The review of the study included:    Study purpose     Conflict of interest    Device description      Study visits    Risks of participation    Benefits (if any)    Alternatives    Voluntary participation    Confidentiality     Compensation/costs of participation    Study stipends    Injury and legal rights    The subject was provided time to review the consent form and consider participation. his questions were answered to his satisfaction.   The patient has voluntarily agreed to participate in the above noted study.     The consent form version 12 Nov 2019 and HIPAA form version 11 June 2019 was signed 11/18/19 at 0808    The subject was provided with a copy of the consent form and HIPAA. A copy of the signed forms was forwarded to medical records.    No study procedures were done prior to Rogers JAVIER Laignacio providing informed consent.     Sierra Behr-Holewinski    Subject Restrictions During Study -Confirmed with Subject prior to any study procedures completed    Restrictions on jewelry, recreational drugs, caffeine, and exercise few days prior and during study.   1. Subjects should not consume excessive amount of caffeine (6 or more 8-oz cups of coffee, or more than 570 mg of caffeine from energy drinks, pills or similar substance) during their participation in  the study.   2. Subjects should not consume excessive amount of alcohol for the duration of their participation in the study. A typical moderate amount is allowed during stage 3.   3. Subjects should not take any recreational drugs (including, but not limited to methamphetamines, cocaine, opioids, cannabis, LSD) for the duration of their participation in the study.   4. Subjects should not wear underwire bra or jewelry during the in-lab study (to not interfere with electrode placement and ECG data recordings).   5. Subject will not be permitted to have their cell phone or any electronic recording device on or with them during the in-lab test session(s).   6. Subjects under 22 years old will not be permitted to take ECG recordings through the ECG yanna on the wrist-worn devices.     For study stage 3 only   1. Subjects should only do high intensity exercise (e.g. sprinting, heavy lifting, etc.) in the morning upon awakening or else not at all   2. Subjects should abstain from swimming during the time of the study   3. Subjects should only shower in the morning upon awakening (or else not at all)   4. Female subjects are strongly suggested to wear non-underwire bras throughout this stage of the study     S[facility]      Study Data collections   Vitals  (TPBP)     Vitals:    11/18/19 0812 11/18/19 0817 11/18/19 0828   BP: (!) 150/110 (!) 153/115 (!) 156/110   Patient Site: Left Arm Left Arm Right Arm   Patient Position: Sitting Sitting Sitting   Cuff Size: Adult Regular Adult Regular Adult Regular   Pulse: 78 88 82   Resp: 14     Temp: 97.3  F (36.3  C)     TempSrc: Oral        VS taken after 5 min rest     MAP 1    120  MAP 2      124   MAP 3             125          There is no height or weight on file to calculate BMI.  male  1950  69 y.o.    Patient did not meet Exclusion criteria due to blood pressures being too high. Did reschedule for a future appointment.     Sierra Behr-Holewinski

## 2021-06-28 NOTE — PROGRESS NOTES
Progress Notes by Rakel Joseph MD at 10/1/2019  1:30 PM     Author: Rakel Joseph MD Service: -- Author Type: Physician    Filed: 10/1/2019  2:04 PM Encounter Date: 10/1/2019 Status: Signed    : Rakel Joseph MD (Physician)                  [email or web address]  [phone]         Thank you Dr. Miguel for asking the [facility] team to participate in the care of your patient, Rogers Huffman.     Impression and Plan     1.  Atrial fibrillation (suspected paroxysmal).  Patient with diagnosis of atrial fibrillation earlier this year of uncertain duration and frequency. Holter monitor 2 March 2018 revealed some mild tendency toward increased ventricular response. His metoprolol was increased from 50 mg twice a day to 100 mg twice a day. Follow-up Holter monitor 6 April 2018 revealed well controlled ventricular response. Patient is completely unaware of the rhythm disturbance.      I suspect that his atrial fibrillation is indeed paroxysmal.  Given he is completely asymptomatic with the rhythm disturbance, aggressive measures to maintain sinus rhythm have not been pursued.  This in part is in accordance with patient wishes.      Rogers [id number] Score is 2 yielding an annual embolic risk of 2.2-2.9%. Rogers was started on apixaban (Eliquis ) for CVA prophylaxis at time of original diagnosis. Recommended:    Continue metoprolol 100 mg twice a day    Continue apixaban (Eliquis ) for CVA prophylaxis.     2.  Hypertension.  Blood pressure is fairly reasonable in the office today.     Plan on follow-up in 1 year.         History of Present Illness    Once again I would like to thank you again for asking me to participate in the care of your patient, Rogers Huffman.  As you know, but to reiterate for my own records, Rogers Huffman is a 69 y.o. male who had initially been seen in the clinic for preoperative evaluation in anticipation of prostate surgery.  His ECG revealed  evidence of atrial fibrillation.  Patient, however, was completely unaware of the rhythm disturbance.     At time of initial consultation, Rogers did indicate that he had been told in the past that he has had some irregular in his heart rhythm though has never been actually diagnosed with atrial fibrillation.  Patient reports no concerning cardiac symptoms.  He specifically denies chest pain, shortness of breath, or any subjective decline in exercise tolerance.  He remains essentially completely asymptomatic with the rhythm disturbance.     Rogers has been maintained on apixaban (Eliquis ) for CVA prophylaxis.    Further review of systems is otherwise negative/noncontributory (medical record and 13 point review of systems reviewed as well and pertinent positives noted).         Cardiac Diagnostics      Echocardiogram 30 January 2018:  1. Normal left ventricular size and systolic performance with ejection fraction of 60-65%.  2. No significant valvular heart disease.  3. Normal right ventricular size and systolic performance.  4. Moderate left atrial enlargement. Mild right atrial enlargement.    Holter monitor 6 April 2018:  1. Abnormal Holter monitor tracing by virtue of the persistent atrial fibrillation demonstrated throughout the recording interval.  2. The ventricular response appears to be well controlled.  3. No symptomatic recordings therefore correlation with patient's clinical events cannot be made.  4. No significant ventricular arrhythmia  5. No profound bradycardia or pauses    Holter monitor 2 March 2018:  1. Persistent atrial fibrillation with some mild tendency towards rapid ventricular response during daytime hours.    12-lead ECG (personally reviewed) 26 January 2018: Sinus rhythm with heart rate of 69 bpm.  Solitary PVC.     12-lead ECG (personally reviewed) 24 January 2018: Atrial fibrillation.  Cannot rule out inferior infarction.            Physical Examination       /86 (Patient Site: Left  "Arm, Patient Position: Sitting, Cuff Size: Adult Regular)   Pulse 64   Resp 16   Ht 5' 11.26\" (1.81 m)   Wt (!) 227 lb (103 kg)   BMI 31.43 kg/m          Wt Readings from Last 3 Encounters:   10/01/19 (!) 227 lb (103 kg)   06/19/19 (!) 234 lb 3 oz (106.2 kg)   10/25/18 (!) 223 lb (101.2 kg)       The patient is alert and oriented times three. Sclerae are anicteric. Mucosal membranes are moist. Jugular venous pressure is normal. No significant adenopathy/thyromegally appreciated. Lungs are clear with good expansion. On cardiovascular exam, the patient has an irregular S1 and S2. Abdomen is soft and non-tender. Extremities reveal no clubbing, cyanosis, or edema.       Family History/Social History/Risk Factors   Patient does not smoke.  Family history of hypertension.         Medications  Allergies   Current Outpatient Medications   Medication Sig Dispense Refill   ? apixaban (ELIQUIS) 5 mg Tab tablet Take 1 tablet (5 mg total) by mouth 2 (two) times a day. 180 tablet 1   ? DOCOSAHEXANOIC ACID/EPA (FISH OIL ORAL) Take 600 mg by mouth daily.      ? metoprolol tartrate (LOPRESSOR) 100 MG tablet TAKE 1 TABLET BY MOUTH TWICE DAILY 180 tablet 2   ? multivitamin therapeutic tablet Take 1 tablet by mouth daily.     ? pramipexole (MIRAPEX) 0.5 MG tablet Take 1.5 tablets (0.75 mg total) by mouth at bedtime. 135 tablet 3   ? sildenafil (VIAGRA) 50 MG tablet Take 100 mg by mouth every other day.      ? ELIQUIS 5 mg Tab tablet TAKE 1 TABLET BY MOUTH TWICE DAILY 180 tablet 1     No current facility-administered medications for this visit.       No Known Allergies       Lab Results   Lab Results   Component Value Date     06/19/2019    K 4.6 06/19/2019     06/19/2019    CO2 28 06/19/2019    BUN 15 06/19/2019    CREATININE 1.05 06/19/2019    CALCIUM 9.6 06/19/2019     Lab Results   Component Value Date    WBC 7.4 04/16/2018    WBC 9.8 08/05/2015    HGB 12.8 (L) 05/04/2018    HCT 47.5 04/16/2018    MCV 90 04/16/2018 "     04/16/2018     Lab Results   Component Value Date    CHOL 196 06/19/2019    TRIG 185 (H) 06/19/2019    HDL 37 (L) 06/19/2019    LDLCALC 122 06/19/2019     Lab Results   Component Value Date    INR 1.12 (H) 05/03/2018     Lab Results   Component Value Date    TSH 1.55 01/26/2018

## 2021-06-30 NOTE — PROGRESS NOTES
Progress Notes by Rakel Joseph MD at 3/25/2021  8:00 AM     Author: Rakel Joseph MD Service: -- Author Type: Physician    Filed: 3/25/2021  8:04 AM Encounter Date: 3/25/2021 Status: Signed    : Rakel Joseph MD (Physician)                                       Thank you Dr. Miguel for asking the [facility] team to participate in the care of your patient, Rogers Huffman.     Impression and Plan     1.  Atrial fibrillation (suspected paroxysmal).  Patient with diagnosis of atrial fibrillation earlier this year of uncertain duration and frequency. Holter monitor 2 March 2018 revealed some mild tendency toward increased ventricular response. His metoprolol was increased from 50 mg twice a day to 100 mg twice a day. Follow-up Holter monitor 6 April 2018 revealed well controlled ventricular response. Patient is completely unaware of the rhythm disturbance.      I suspect that his atrial fibrillation is indeed paroxysmal.  Given he is completely asymptomatic with the rhythm disturbance, aggressive measures to maintain sinus rhythm have not been pursued.  This in part is in accordance with patient wishes.  Quentin has noted some slight increase in heart rates and brought in a chart of his readings.  His metoprolol was increased to 200 mg daily in January 2021.      Rogers [id number] Score is 2 yielding an annual embolic risk of 2.2-2.9%. Rogers was started on apixaban (Eliquis ) for CVA prophylaxis at time of original diagnosis. Recommended:    Continue metoprolol succinate 200 mg daily.    In addition, will initiate Cardizem  mg daily.    Continue apixaban (Eliquis ) for CVA prophylaxis.     2.  Hypertension.  Blood pressure somewhat elevated in the office today and he has been having higher readings at home as well.  Plan:    As aforementioned, will initiate Cardizem  mg daily.     Plan on follow-up in in 3 months to reassess blood pressure as well as heart  rate.    25 minutes spent reviewing prior records (including documentation, laboratory studies, cardiac testing/imaging), interview with patient along with physical exam, planning, and subsequent documentation/crafting of note.           History of Present Illness    Once again I would like to thank you again for asking me to participate in the care of your patient, Rogers Huffman.  As you know, but to reiterate for my own records, Rogers Huffman is a 70 y.o. male who had initially been seen in the clinic for preoperative evaluation in anticipation of prostate surgery.  His ECG revealed evidence of atrial fibrillation.  Patient, however, was completely unaware of the rhythm disturbance.     At time of initial consultation, Rogers did indicate that he had been told in the past that he has had some irregular in his heart rhythm though has never been actually diagnosed with atrial fibrillation.  Patient reports no concerning cardiac symptoms.  He specifically denies chest pain, shortness of breath, or any subjective decline in exercise tolerance.  He remains essentially completely asymptomatic with the rhythm disturbance.    He has been recording some tendency toward higher blood pressure readings at home as well as slight increase in pulse as well.     Rogers has been maintained on apixaban (Eliquis ) for CVA prophylaxis.    Further review of systems is otherwise negative/noncontributory (medical record and 13 point review of systems reviewed as well and pertinent positives noted).         Cardiac Diagnostics      Echocardiogram 30 January 2018:  1. Normal left ventricular size and systolic performance with ejection fraction of 60-65%.  2. No significant valvular heart disease.  3. Normal right ventricular size and systolic performance.  4. Moderate left atrial enlargement. Mild right atrial enlargement.    Holter monitor 6 April 2018:  1. Abnormal Holter monitor tracing by virtue of the persistent atrial fibrillation  demonstrated throughout the recording interval.  2. The ventricular response appears to be well controlled.  3. No symptomatic recordings therefore correlation with patient's clinical events cannot be made.  4. No significant ventricular arrhythmia  5. No profound bradycardia or pauses    Holter monitor 2 March 2018:  1. Persistent atrial fibrillation with some mild tendency towards rapid ventricular response during daytime hours.    12-lead ECG (personally reviewed) 26 January 2018: Sinus rhythm with heart rate of 69 bpm.  Solitary PVC.     12-lead ECG (personally reviewed) 24 January 2018: Atrial fibrillation.  Cannot rule out inferior infarction.            Physical Examination       BP (!) 142/92   Pulse 92   Resp 20   Wt (!) 235 lb (106.6 kg)   BMI 32.55 kg/m          Wt Readings from Last 3 Encounters:   03/25/21 (!) 235 lb (106.6 kg)   09/18/20 (!) 234 lb 6 oz (106.3 kg)   12/04/19 (!) 230 lb 6.4 oz (104.5 kg)       The patient is alert and oriented times three. Sclerae are anicteric. Mucosal membranes are moist. Jugular venous pressure is normal. No significant adenopathy/thyromegally appreciated. Lungs are clear with good expansion. On cardiovascular exam, the patient has an irregular S1 and S2. Abdomen is soft and non-tender. Extremities reveal no clubbing, cyanosis, or edema.         Family History/Social History/Risk Factors   Patient does not smoke.  Family history reviewed, and family history includes Cancer in his maternal aunt; Hypertension in his father; Leukemia in his mother.          Medications  Allergies   Current Outpatient Medications   Medication Sig Dispense Refill   ? atorvastatin (LIPITOR) 10 MG tablet Take 1 tablet by mouth once daily 90 tablet 2   ? cholecalciferol, vitamin D3, 5,000 unit Tab Take by mouth.     ? DOCOSAHEXANOIC ACID/EPA (FISH OIL ORAL) Take 600 mg by mouth daily.      ? ELIQUIS 5 mg Tab tablet Take 1 tablet by mouth twice daily 180 tablet 0   ? metoprolol succinate  (TOPROL-XL) 100 MG 24 hr tablet Take 1 tablet (100 mg total) by mouth daily. (Patient taking differently: Take 200 mg by mouth daily. ) 90 tablet 1   ? multivitamin therapeutic tablet Take 1 tablet by mouth daily.     ? pramipexole (MIRAPEX) 0.5 MG tablet TAKE 1 & 1/2 (ONE & ONE-HALF) TABLETS BY MOUTH AT BEDTIME 135 tablet 1   ? sildenafil (VIAGRA) 50 MG tablet Take 100 mg by mouth every other day.      ? diltiazem (CARDIZEM CD) 120 MG 24 hr capsule Take 1 capsule (120 mg total) by mouth daily. 90 capsule 3     No current facility-administered medications for this visit.       No Known Allergies       Lab Results   Lab Results   Component Value Date     09/18/2020    K 5.3 (H) 09/18/2020     09/18/2020    CO2 27 09/18/2020    BUN 15 09/18/2020    CREATININE 1.01 09/18/2020    CALCIUM 9.8 09/18/2020     Lab Results   Component Value Date    WBC 7.4 04/16/2018    WBC 9.8 08/05/2015    HGB 12.8 (L) 05/04/2018    HCT 47.5 04/16/2018    MCV 90 04/16/2018     04/16/2018     Lab Results   Component Value Date    CHOL 139 09/18/2020    TRIG 82 09/18/2020    HDL 46 09/18/2020    LDLCALC 77 09/18/2020     Lab Results   Component Value Date    INR 1.12 (H) 05/03/2018     Lab Results   Component Value Date    TSH 1.55 01/26/2018

## 2021-07-03 NOTE — ADDENDUM NOTE
Addendum Note by Vivi Miguel MD at 10/27/2020  8:00 AM     Author: Vivi Miguel MD Service: -- Author Type: Physician    Filed: 10/27/2020  8:00 AM Encounter Date: 10/25/2020 Status: Signed    : Vivi Miguel MD (Physician)    Addended by: VIVI MIGUEL on: 10/27/2020 08:00 AM        Modules accepted: Orders

## 2021-07-03 NOTE — ANESTHESIA PREPROCEDURE EVALUATION
Anesthesia Preprocedure Evaluation by Sneha Rod MD at 2/6/2018  9:27 AM     Author: Sneha Rod MD Service: -- Author Type: Physician    Filed: 2/6/2018  9:31 AM Date of Service: 2/6/2018  9:27 AM Status: Signed    : Sneha Rod MD (Physician)       Anesthesia Evaluation      Patient summary reviewed   No history of anesthetic complications     Airway   Mallampati: II   Pulmonary - negative ROS and normal exam                          Cardiovascular   Exercise tolerance: > or = 4 METS  (+) hypertension, dysrhythmias (A-fib), ,     (-) murmur  Rhythm: irregular  Rate: normal,    no murmur   PE comment: Currently in atrial fibrillation,     Neuro/Psych      Endo/Other    (+) obesity (Obesity),      GI/Hepatic/Renal            Dental                             Anesthesia Plan  Planned anesthetic: general endotracheal  Patient is a 68 yo M presenting for Robot-asst retropubic prostatecomy bilateral pelvic lymph node dissection  ASA 2   Induction: intravenous   Anesthetic plan and risks discussed with: patient and spouse  Anesthesia plan special considerations: antiemetics,   Post-op plan: routine recovery

## 2021-07-17 DIAGNOSIS — I48.0 PAROXYSMAL ATRIAL FIBRILLATION (H): Primary | ICD-10-CM

## 2021-07-19 RX ORDER — APIXABAN 5 MG/1
TABLET, FILM COATED ORAL
Qty: 180 TABLET | Refills: 0 | Status: SHIPPED | OUTPATIENT
Start: 2021-07-19 | End: 2021-11-22

## 2021-08-08 ENCOUNTER — MYC MEDICAL ADVICE (OUTPATIENT)
Dept: FAMILY MEDICINE | Facility: CLINIC | Age: 71
End: 2021-08-08

## 2021-08-16 DIAGNOSIS — G25.81 RESTLESS LEGS SYNDROME (RLS): Primary | ICD-10-CM

## 2021-08-17 NOTE — TELEPHONE ENCOUNTER
Routing refill request to provider for review/approval because:  Would like Provider to decide SIG as office visit says 0.25 mg 2 times daily; request SIG is 1.5 tablets at bedtime of a 0.5 mg tablet.  Thank you.   Luis Manuel Zavala RN

## 2021-08-18 RX ORDER — PRAMIPEXOLE DIHYDROCHLORIDE 0.5 MG/1
0.75 TABLET ORAL AT BEDTIME
Qty: 135 TABLET | Refills: 0 | Status: SHIPPED | OUTPATIENT
Start: 2021-08-18 | End: 2021-11-10

## 2021-09-16 ENCOUNTER — NURSE TRIAGE (OUTPATIENT)
Dept: NURSING | Facility: CLINIC | Age: 71
End: 2021-09-16

## 2021-09-16 NOTE — TELEPHONE ENCOUNTER
Noticed blood in urine today. He is on Eliquis. He said his wife will help him get to the ER today. He said they are down in [place].  Leonie Tran RN  [facility]      Reason for Disposition    Passing pure blood or large blood clots (i.e., larger than a dime or grape)    Additional Information    Negative: Shock suspected (e.g., cold/pale/clammy skin, too weak to stand, low BP, rapid pulse)    Negative: Sounds like a life-threatening emergency to the triager    Negative: Urinary catheter, questions about    Negative: Recent back or abdominal injury    Negative: Recent genital injury    Negative: Unable to urinate (or only a few drops) > 4 hours and bladder feels very full (e.g., palpable bladder or strong urge to urinate)    Protocols used: URINE - BLOOD IN-A-OH

## 2021-09-26 ENCOUNTER — HEALTH MAINTENANCE LETTER (OUTPATIENT)
Age: 71
End: 2021-09-26

## 2021-10-23 DIAGNOSIS — I48.0 PAROXYSMAL ATRIAL FIBRILLATION (H): ICD-10-CM

## 2021-10-23 NOTE — TELEPHONE ENCOUNTER
After reviewing 9/16/21 Nurse Triage note;  Routing metoprolol and eliquis refill requests to Provider to decide.  Thank you.  Luis Manuel Zavala, RN

## 2021-10-24 NOTE — TELEPHONE ENCOUNTER
I have not seen him in over a year - please make an appt for an annual wellness visit and route back for refills if needed    EB

## 2021-10-25 RX ORDER — METOPROLOL SUCCINATE 100 MG/1
TABLET, EXTENDED RELEASE ORAL
Qty: 180 TABLET | Refills: 0 | OUTPATIENT
Start: 2021-10-25

## 2021-10-25 RX ORDER — APIXABAN 5 MG/1
TABLET, FILM COATED ORAL
Qty: 180 TABLET | Refills: 0 | OUTPATIENT
Start: 2021-10-25

## 2021-10-26 RX ORDER — METOPROLOL SUCCINATE 100 MG/1
2 TABLET, EXTENDED RELEASE ORAL DAILY
COMMUNITY
Start: 2021-07-17 | End: 2021-10-26

## 2021-10-26 RX ORDER — METOPROLOL SUCCINATE 100 MG/1
200 TABLET, EXTENDED RELEASE ORAL DAILY
Qty: 60 TABLET | Refills: 0 | Status: SHIPPED | OUTPATIENT
Start: 2021-10-26 | End: 2021-11-22

## 2021-10-30 ASSESSMENT — ENCOUNTER SYMPTOMS
EYE PAIN: 0
DYSURIA: 0
FEVER: 0
CONSTIPATION: 0
HEMATURIA: 1
SORE THROAT: 0
PALPITATIONS: 0
ARTHRALGIAS: 1
NAUSEA: 0
DIZZINESS: 0
DIARRHEA: 0
COUGH: 0
ABDOMINAL PAIN: 0
CHILLS: 0
HEADACHES: 0
HEARTBURN: 0
WEAKNESS: 0
NERVOUS/ANXIOUS: 0
PARESTHESIAS: 0
MYALGIAS: 0
JOINT SWELLING: 0
SHORTNESS OF BREATH: 0
FREQUENCY: 0
HEMATOCHEZIA: 0

## 2021-10-30 ASSESSMENT — ACTIVITIES OF DAILY LIVING (ADL): CURRENT_FUNCTION: NO ASSISTANCE NEEDED

## 2021-11-01 ENCOUNTER — OFFICE VISIT (OUTPATIENT)
Dept: FAMILY MEDICINE | Facility: CLINIC | Age: 71
End: 2021-11-01
Payer: COMMERCIAL

## 2021-11-01 VITALS
RESPIRATION RATE: 16 BRPM | HEART RATE: 76 BPM | SYSTOLIC BLOOD PRESSURE: 138 MMHG | BODY MASS INDEX: 32.62 KG/M2 | WEIGHT: 233 LBS | HEIGHT: 71 IN | TEMPERATURE: 96.8 F | DIASTOLIC BLOOD PRESSURE: 84 MMHG

## 2021-11-01 DIAGNOSIS — C61 PROSTATE CANCER (H): ICD-10-CM

## 2021-11-01 DIAGNOSIS — E78.5 HYPERLIPIDEMIA LDL GOAL <100: ICD-10-CM

## 2021-11-01 DIAGNOSIS — Z00.00 ENCOUNTER FOR MEDICARE ANNUAL WELLNESS EXAM: Primary | ICD-10-CM

## 2021-11-01 DIAGNOSIS — I48.0 PAROXYSMAL ATRIAL FIBRILLATION (H): ICD-10-CM

## 2021-11-01 PROBLEM — R79.9 ABNORMAL BLOOD CHEMISTRY: Status: ACTIVE | Noted: 2021-11-01

## 2021-11-01 PROBLEM — G25.81 RESTLESS LEGS SYNDROME: Status: ACTIVE | Noted: 2021-11-01

## 2021-11-01 PROBLEM — D12.2 BENIGN NEOPLASM OF ASCENDING COLON: Status: ACTIVE | Noted: 2019-09-30

## 2021-11-01 PROBLEM — B35.6 TINEA CRURIS: Status: RESOLVED | Noted: 2021-11-01 | Resolved: 2021-11-01

## 2021-11-01 PROBLEM — R10.31 ABDOMINAL PAIN, RIGHT LOWER QUADRANT: Status: ACTIVE | Noted: 2021-11-01

## 2021-11-01 PROBLEM — M71.9 DISORDER OF BURSAE AND TENDONS IN SHOULDER REGION: Status: ACTIVE | Noted: 2021-11-01

## 2021-11-01 PROBLEM — L03.90 CELLULITIS: Status: ACTIVE | Noted: 2021-11-01

## 2021-11-01 PROBLEM — B35.6 TINEA CRURIS: Status: ACTIVE | Noted: 2021-11-01

## 2021-11-01 PROBLEM — R10.31 ABDOMINAL PAIN, RIGHT LOWER QUADRANT: Status: RESOLVED | Noted: 2021-11-01 | Resolved: 2021-11-01

## 2021-11-01 PROBLEM — R79.9 ABNORMAL BLOOD CHEMISTRY: Status: RESOLVED | Noted: 2021-11-01 | Resolved: 2021-11-01

## 2021-11-01 PROBLEM — L03.90 CELLULITIS: Status: RESOLVED | Noted: 2021-11-01 | Resolved: 2021-11-01

## 2021-11-01 PROBLEM — R00.0 TACHYCARDIA: Status: ACTIVE | Noted: 2018-02-07

## 2021-11-01 PROBLEM — D12.0 BENIGN NEOPLASM OF CECUM: Status: ACTIVE | Noted: 2019-09-30

## 2021-11-01 PROBLEM — Z96.659 S/P TOTAL KNEE ARTHROPLASTY: Status: ACTIVE | Noted: 2018-05-03

## 2021-11-01 PROBLEM — K64.9 HEMORRHOIDS: Status: RESOLVED | Noted: 2019-09-26 | Resolved: 2021-11-01

## 2021-11-01 PROBLEM — K64.9 HEMORRHOIDS: Status: ACTIVE | Noted: 2019-09-26

## 2021-11-01 PROBLEM — M67.919 DISORDER OF BURSAE AND TENDONS IN SHOULDER REGION: Status: ACTIVE | Noted: 2021-11-01

## 2021-11-01 PROBLEM — K63.5 POLYP OF COLON: Status: ACTIVE | Noted: 2019-09-26

## 2021-11-01 PROBLEM — R00.0 TACHYCARDIA: Status: RESOLVED | Noted: 2018-02-07 | Resolved: 2021-11-01

## 2021-11-01 PROCEDURE — 99397 PER PM REEVAL EST PAT 65+ YR: CPT | Performed by: FAMILY MEDICINE

## 2021-11-01 RX ORDER — MULTIVIT WITH MINERALS/LUTEIN
1 TABLET ORAL DAILY
COMMUNITY
Start: 2015-01-30

## 2021-11-01 RX ORDER — SILDENAFIL CITRATE 20 MG/1
20 TABLET ORAL DAILY PRN
COMMUNITY
Start: 2021-08-30

## 2021-11-01 RX ORDER — IPRATROPIUM BROMIDE 21 UG/1
SPRAY, METERED NASAL
COMMUNITY
Start: 2021-03-19 | End: 2021-11-01

## 2021-11-01 RX ORDER — MULTIVITAMIN WITH IRON
1 TABLET ORAL DAILY
COMMUNITY
Start: 2021-08-30

## 2021-11-01 RX ORDER — DILTIAZEM HYDROCHLORIDE 120 MG/1
120 CAPSULE, COATED, EXTENDED RELEASE ORAL DAILY
COMMUNITY
Start: 2021-09-19 | End: 2022-03-24

## 2021-11-01 ASSESSMENT — MIFFLIN-ST. JEOR: SCORE: 1844.01

## 2021-11-01 NOTE — PATIENT INSTRUCTIONS
Patient Education   Personalized Prevention Plan  You are due for the preventive services outlined below.  Your care team is available to assist you in scheduling these services.  If you have already completed any of these items, please share that information with your care team to update in your medical record.  Health Maintenance Due   Topic Date Due     ANNUAL REVIEW OF HM ORDERS  Never done     Hepatitis C Screening  Never done     AORTIC ANEURYSM SCREENING (SYSTEM ASSIGNED)  Never done     Annual Wellness Visit  09/18/2021     FALL RISK ASSESSMENT  09/18/2021

## 2021-11-01 NOTE — PROGRESS NOTES
"  SUBJECTIVE:   Rogers Huffman is a 71 year old male who presents for Preventive Visit.    Patient has been advised of split billing requirements and indicates understanding: Yes  Are you in the first 12 months of your Medicare Part B coverage?  No      Answers for HPI/ROS submitted by the patient on 10/30/2021  In general, how would you rate your overall physical health?: good  Frequency of exercise:: 4-5 days/week  Do you usually eat at least 4 servings of fruit and vegetables a day, include whole grains & fiber, and avoid regularly eating high fat or \"junk\" foods? : Yes  Taking medications regularly:: Yes  Medication side effects:: None  Activities of Daily Living: no assistance needed  Home safety: lack of grab bars in the bathroom  Hearing Impairment:: difficulty following a conversation in a noisy restaurant or crowded room, difficulty understanding soft or whispered speech  In the past 6 months, have you been bothered by leaking of urine?: No  abdominal pain: No  Blood in stool: No  Blood in urine: Yes  chest pain: No  chills: No  congestion: No  constipation: No  cough: No  diarrhea: No  dizziness: No  ear pain: No  eye pain: No  nervous/anxious: No  fever: No  frequency: No  genital sores: No  headaches: No  hearing loss: No  heartburn: No  arthralgias: Yes  joint swelling: No  peripheral edema: No  mood changes: No  myalgias: No  nausea: No  dysuria: No  palpitations: No  Skin sensation changes: No  sore throat: No  urgency: No  rash: No  shortness of breath: No  visual disturbance: No  weakness: No  impotence: Yes  In general, how would you rate your overall mental or emotional health?: excellent  Additional concerns today:: No  Duration of exercise:: 30-45 minutes    Do you feel safe in your environment? Yes    Have you ever done Advance Care Planning? (For example, a Health Directive, POLST, or a discussion with a medical provider or your loved ones about your wishes): Yes, advance care planning is on " file.    Additional concerns to address?      Fall risk:     Cognitive Screenin) Repeat 3 items (Leader, Season, Table)    2) Clock draw: NORMAL  3) 3 item recall: Recalls 3 objects  Results: 3 items recalled: COGNITIVE IMPAIRMENT LESS LIKELY    Mini-CogTM Copyright JOSSIE Johnson. Licensed by the author for use in [facility]; reprinted with permission (martin@[facility]). All rights reserved.      Do you have sleep apnea, excessive snoring or daytime drowsiness?: no    Reviewed and updated as needed this visit by clinical staff  Tobacco  Allergies  Meds              Reviewed and updated as needed this visit by Provider                Social History     Tobacco Use     Smoking status: Former Smoker     Packs/day: 0.50     Years: 3.00     Pack years: 1.50     Types: Cigarettes     Quit date: 1975     Years since quittin.8     Smokeless tobacco: Never Used     Tobacco comment: Smoked during college   Substance Use Topics     Alcohol use: Yes     Alcohol/week: 1.0 standard drinks     Types: 1 Glasses of wine per week     Comment: 5 days/week                           Current providers sharing in care for this patient include:   Patient Care Team:  Vivi Miguel MD as PCP - General (Family Practice)  Rakel Joseph MD as Assigned Heart and Vascular Provider  Vivi Miguel MD as Assigned PCP    The following health maintenance items are reviewed in Epic and correct as of today:  Health Maintenance   Topic Date Due     HEPATITIS C SCREENING  Never done     AORTIC ANEURYSM SCREENING (SYSTEM ASSIGNED)  Never done     FALL RISK ASSESSMENT  2021     MEDICARE ANNUAL WELLNESS VISIT  2022     ANNUAL REVIEW OF HM ORDERS  2022     LIPID  2025     ADVANCE CARE PLANNING  2026     DTAP/TDAP/TD IMMUNIZATION (3 - Td or Tdap) 2029     COLORECTAL CANCER SCREENING  2029     PHQ-2  Completed     INFLUENZA VACCINE  Completed     Pneumococcal Vaccine:  "65+ Years  Completed     ZOSTER IMMUNIZATION  Completed     COVID-19 Vaccine  Completed     IPV IMMUNIZATION  Aged Out     MENINGITIS IMMUNIZATION  Aged Out     HEPATITIS B IMMUNIZATION  Aged Out       ROS:  Constitutional, HEENT, cardiovascular, pulmonary, GI, , musculoskeletal, neuro, skin, endocrine and psych systems are negative, except as otherwise noted.    OBJECTIVE:   /84   Pulse 76   Temp 96.8  F (36  C) (Tympanic)   Resp 16   Ht 1.803 m (5' 11\")   Wt 105.7 kg (233 lb)   BMI 32.50 kg/m   Estimated body mass index is 32.5 kg/m  as calculated from the following:    Height as of this encounter: 1.803 m (5' 11\").    Weight as of this encounter: 105.7 kg (233 lb).  EXAM:   Objective:  Vital signs reviewed and stable  General: No acute distress  Psych: Appropriate affect  HEENT: moist mucous membranes, pupils equal, round, reactive to light and accomodation, tympanic membranes are pearly grey bilaterally  Lymph: no cervical or supraclavicular lymphadenopathy  Cardiovascular: regular rate and rhythm with no murmur  Pulmonary: clear to auscultation bilaterally with no wheeze  Abdomen: soft, non tender, non distended with normo-active bowel sounds  Extremities: warm and well perfused with no edema  Skin: warm and dry with no rash      Diagnostic Test Results:  Labs reviewed in Epic    ASSESSMENT / PLAN:   (Z00.00) Encounter for Medicare annual wellness exam  (primary encounter diagnosis)    Purchased a condo in [place] and is excited to go there for a few months during the winter with his wife.    Comment:   Plan: COMPREHENSIVE METABOLIC PANEL, Lipid panel         reflex to direct LDL Fasting, CBC with         Platelets, REVIEW OF HEALTH MAINTENANCE         PROTOCOL ORDERS, Hepatitis C Screen Reflex to         HCV RNA Quant and Genotype           (I48.0) Paroxysmal atrial fibrillation (H)  Comment: Patient will be following with cardiology  Plan: COMPREHENSIVE METABOLIC PANEL, CBC with         " "Platelets            (E78.5) Hyperlipidemia LDL goal <100  Comment: Patient will come back fasting for cholesterol levels  Plan: Lipid panel reflex to direct LDL Fasting          (C61) Prostate cancer (H)  Comment: Continue to follow with urology    Patient has been advised of split billing requirements and indicates understanding: Yes    COUNSELING:  Reviewed preventive health counseling, as reflected in patient instructions    Estimated body mass index is 32.5 kg/m  as calculated from the following:    Height as of this encounter: 1.803 m (5' 11\").    Weight as of this encounter: 105.7 kg (233 lb).    Weight management plan: Discussed healthy diet and exercise guidelines    He reports that he quit smoking about 46 years ago. His smoking use included cigarettes. He has a 1.50 pack-year smoking history. He has never used smokeless tobacco.    Appropriate preventive services were discussed with this patient, including applicable screening as appropriate for cardiovascular disease, diabetes, osteopenia/osteoporosis, and glaucoma.  As appropriate for age/gender, discussed screening for colorectal cancer, prostate cancer, breast cancer, and cervical cancer. Checklist reviewing preventive services available has been given to the patient.    Reviewed patients plan of care and provided an AVS. The Basic Care Plan (routine screening as documented in Health Maintenance) for Rogers meets the Care Plan requirement. This Care Plan has been established and reviewed with the Patient.    Counseling Resources:  ATP IV Guidelines  Pooled Cohorts Equation Calculator  Breast Cancer Risk Calculator  BRCA-Related Cancer Risk Assessment: FHS-7 Tool  FRAX Risk Assessment  ICSI Preventive Guidelines  Dietary Guidelines for Americans, 2010  [employer] MyPlate  ASA Prophylaxis  Lung CA Screening    Vivi Miguel MD  [facility]"

## 2021-11-03 ENCOUNTER — LAB (OUTPATIENT)
Dept: LAB | Facility: CLINIC | Age: 71
End: 2021-11-03
Payer: COMMERCIAL

## 2021-11-03 DIAGNOSIS — Z00.00 ENCOUNTER FOR MEDICARE ANNUAL WELLNESS EXAM: ICD-10-CM

## 2021-11-03 DIAGNOSIS — I48.0 PAROXYSMAL ATRIAL FIBRILLATION (H): ICD-10-CM

## 2021-11-03 DIAGNOSIS — E78.5 HYPERLIPIDEMIA LDL GOAL <100: ICD-10-CM

## 2021-11-03 LAB
ALBUMIN SERPL-MCNC: 3.6 G/DL (ref 3.4–5)
ALP SERPL-CCNC: 120 U/L (ref 40–150)
ALT SERPL W P-5'-P-CCNC: 32 U/L (ref 0–70)
ANION GAP SERPL CALCULATED.3IONS-SCNC: 2 MMOL/L (ref 3–14)
AST SERPL W P-5'-P-CCNC: 26 U/L (ref 0–45)
BILIRUB SERPL-MCNC: 0.7 MG/DL (ref 0.2–1.3)
BUN SERPL-MCNC: 15 MG/DL (ref 7–30)
CALCIUM SERPL-MCNC: 9.3 MG/DL (ref 8.5–10.1)
CHLORIDE BLD-SCNC: 105 MMOL/L (ref 94–109)
CHOLEST SERPL-MCNC: 112 MG/DL
CO2 SERPL-SCNC: 32 MMOL/L (ref 20–32)
CREAT SERPL-MCNC: 0.94 MG/DL (ref 0.66–1.25)
ERYTHROCYTE [DISTWIDTH] IN BLOOD BY AUTOMATED COUNT: 12.8 % (ref 10–15)
FASTING STATUS PATIENT QL REPORTED: YES
GFR SERPL CREATININE-BSD FRML MDRD: 82 ML/MIN/1.73M2
GLUCOSE BLD-MCNC: 100 MG/DL (ref 70–99)
HCT VFR BLD AUTO: 45.1 % (ref 40–53)
HCV AB SERPL QL IA: NONREACTIVE
HDLC SERPL-MCNC: 49 MG/DL
HGB BLD-MCNC: 15.9 G/DL (ref 13.3–17.7)
LDLC SERPL CALC-MCNC: 34 MG/DL
MCH RBC QN AUTO: 31.5 PG (ref 26.5–33)
MCHC RBC AUTO-ENTMCNC: 35.3 G/DL (ref 31.5–36.5)
MCV RBC AUTO: 90 FL (ref 78–100)
NONHDLC SERPL-MCNC: 63 MG/DL
PLATELET # BLD AUTO: 186 10E3/UL (ref 150–450)
POTASSIUM BLD-SCNC: 4.7 MMOL/L (ref 3.4–5.3)
PROT SERPL-MCNC: 7.1 G/DL (ref 6.8–8.8)
RBC # BLD AUTO: 5.04 10E6/UL (ref 4.4–5.9)
SODIUM SERPL-SCNC: 139 MMOL/L (ref 133–144)
TRIGL SERPL-MCNC: 146 MG/DL
WBC # BLD AUTO: 7.1 10E3/UL (ref 4–11)

## 2021-11-03 PROCEDURE — 80053 COMPREHEN METABOLIC PANEL: CPT

## 2021-11-03 PROCEDURE — 85027 COMPLETE CBC AUTOMATED: CPT

## 2021-11-03 PROCEDURE — 86803 HEPATITIS C AB TEST: CPT

## 2021-11-03 PROCEDURE — 36415 COLL VENOUS BLD VENIPUNCTURE: CPT

## 2021-11-03 PROCEDURE — 80061 LIPID PANEL: CPT

## 2021-11-10 ENCOUNTER — OFFICE VISIT (OUTPATIENT)
Dept: CARDIOLOGY | Facility: CLINIC | Age: 71
End: 2021-11-10
Payer: COMMERCIAL

## 2021-11-10 VITALS
HEIGHT: 72 IN | BODY MASS INDEX: 32.02 KG/M2 | HEART RATE: 80 BPM | DIASTOLIC BLOOD PRESSURE: 82 MMHG | RESPIRATION RATE: 16 BRPM | WEIGHT: 236.4 LBS | SYSTOLIC BLOOD PRESSURE: 108 MMHG

## 2021-11-10 DIAGNOSIS — I10 HYPERTENSION, UNSPECIFIED TYPE: ICD-10-CM

## 2021-11-10 DIAGNOSIS — I48.19 PERSISTENT ATRIAL FIBRILLATION (H): Primary | ICD-10-CM

## 2021-11-10 DIAGNOSIS — G25.81 RESTLESS LEGS SYNDROME (RLS): ICD-10-CM

## 2021-11-10 PROCEDURE — 99214 OFFICE O/P EST MOD 30 MIN: CPT | Performed by: INTERNAL MEDICINE

## 2021-11-10 RX ORDER — CHLORAL HYDRATE 500 MG
2 CAPSULE ORAL DAILY
COMMUNITY

## 2021-11-10 RX ORDER — PRAMIPEXOLE DIHYDROCHLORIDE 0.5 MG/1
0.75 TABLET ORAL AT BEDTIME
Qty: 135 TABLET | Refills: 3 | Status: SHIPPED | OUTPATIENT
Start: 2021-11-10 | End: 2022-11-25

## 2021-11-10 ASSESSMENT — MIFFLIN-ST. JEOR: SCORE: 1875.3

## 2021-11-10 NOTE — LETTER
11/10/2021    Vivi Miguel MD  [address] [facility] [address]    RE: Rogers Huffman       Dear Colleague,    I had the pleasure of seeing Rogers Huffman in the [facility] Heart Care.         [facility] HEART CARE   [address], [place] [address]   [email or web address]   OFFICE: [phone]          Thank you Vivi Machado for asking the [facility] Heart Care team to participate in the care of your patient, Rogers Huffman.     Impression and Plan     1.  Atrial fibrillation (persistent).  Patient with diagnosis of atrial fibrillation earlier this year of uncertain duration and frequency. Holter monitor 2 March 2018 revealed some mild tendency toward increased ventricular response. His metoprolol was increased from 50 mg twice a day to 100 mg twice a day. Follow-up Holter monitor 6 April 2018 revealed well controlled ventricular response. Patient is completely unaware of the rhythm disturbance.      I suspect that his atrial fibrillation is indeed paroxysmal.  Given he is completely asymptomatic with the rhythm disturbance, aggressive measures to maintain sinus rhythm have not been pursued.  This in part is in accordance with patient wishes.  Quentin has noted some slight increase in heart rates and brought in a chart of his readings.  His metoprolol was increased to 200 mg daily in January 2021.     Rogers' [id number] Score is 2 yielding an annual embolic risk of 2.2-2.9%. Rogers was started on apixaban (Eliquis ) for CVA prophylaxis at time of original diagnosis. Recommended:    Continue metoprolol succinate 200 mg daily.    In addition, will initiate Cardizem  mg daily.    Continue apixaban (Eliquis ) for CVA prophylaxis.     2.  Hypertension.  Blood pressure is very reasonable in the office today at 108/82 mmHg.    Plan on follow-up in 1 year.    30 minutes spent reviewing prior records (including  documentation, laboratory studies, cardiac testing/imaging), interview with patient along with physical exam, planning, and subsequent documentation/crafting of note.           History of Present Illness    Once again I would like to thank you again for asking me to participate in the care of your patient, Rogers Huffman.  As you know, but to reiterate for my own records, Rogers Huffman is a 71 year old male who had initially been seen in the clinic for preoperative evaluation in anticipation of prostate surgery.  His ECG revealed evidence of atrial fibrillation.  Patient, however, was completely unaware of the rhythm disturbance.     At time of initial consultation, Rogers did indicate that he had been told in the past that he has had some irregular in his heart rhythm though has never been actually diagnosed with atrial fibrillation.  Patient reports no concerning cardiac symptoms.  He specifically denies chest pain, shortness of breath, or any subjective decline in exercise tolerance.  He remains essentially completely asymptomatic with the rhythm disturbance.    Rogers has been maintained on apixaban (Eliquis ) for CVA prophylaxis.    Further review of systems is otherwise negative/noncontributory (medical record and 13 point review of systems reviewed as well and pertinent positives noted).         Cardiac Diagnostics      Echocardiogram 30 January 2018:  1. Normal left ventricular size and systolic performance with ejection fraction of 60-65%.  2. No significant valvular heart disease.  3. Normal right ventricular size and systolic performance.  4. Moderate left atrial enlargement. Mild right atrial enlargement.    Holter monitor 6 April 2018:  1. Abnormal Holter monitor tracing by virtue of the persistent atrial fibrillation demonstrated throughout the recording interval.  2. The ventricular response appears to be well controlled.  3. No symptomatic recordings therefore correlation with patient's clinical  events cannot be made.  4. No significant ventricular arrhythmia  5. No profound bradycardia or pauses    Holter monitor 2 March 2018:  1. Persistent atrial fibrillation with some mild tendency towards rapid ventricular response during daytime hours.    12-lead ECG (personally reviewed) 26 January 2018: Sinus rhythm with heart rate of 69 bpm.  Solitary PVC.     12-lead ECG (personally reviewed) 24 January 2018: Atrial fibrillation.  Cannot rule out inferior infarction.            Physical Examination       /82 (BP Location: Right arm, Patient Position: Sitting, Cuff Size: Adult Large)   Pulse 80   Resp 16   Ht 1.829 m (6')   Wt 107.2 kg (236 lb 6.4 oz)   BMI 32.06 kg/m          Wt Readings from Last 3 Encounters:   11/10/21 107.2 kg (236 lb 6.4 oz)   11/01/21 105.7 kg (233 lb)   03/25/21 106.6 kg (235 lb)       The patient is alert and oriented times three. Sclerae are anicteric. Mucosal membranes are moist. Jugular venous pressure is normal. No significant adenopathy/thyromegally appreciated. Lungs are clear with good expansion. On cardiovascular exam, the patient has an irregular S1 and S2. Abdomen is soft and non-tender. Extremities reveal no clubbing, cyanosis, or edema.         Medications  Allergies   Current Outpatient Medications   Medication Sig Dispense Refill     atorvastatin (LIPITOR) 10 MG tablet Take 1 tablet (10 mg) by mouth daily 90 tablet 0     cholecalciferol (VITAMIN D3) 125 MCG (5000 UT) TABS tablet Take 5,000 Units by mouth daily       diltiazem ER COATED BEADS (CARDIZEM CD/CARTIA XT) 120 MG 24 hr capsule Take 120 mg by mouth daily        ELIQUIS ANTICOAGULANT 5 MG tablet Take 1 tablet by mouth twice daily 180 tablet 0     fish oil-omega-3 fatty acids 1000 MG capsule Take 2 g by mouth daily       magnesium 250 MG tablet Take 1 tablet by mouth daily        metoprolol succinate ER (TOPROL-XL) 100 MG 24 hr tablet Take 2 tablets (200 mg) by mouth daily 60 tablet 0     multivitamin  (CENTRUM SILVER) tablet Take 1 tablet by mouth daily        pramipexole (MIRAPEX) 0.5 MG tablet Take 1.5 tablets (0.75 mg) by mouth At Bedtime 135 tablet 3     sildenafil (REVATIO) 20 MG tablet Take 20 mg by mouth daily as needed        Turmeric (CURCUMIN 95 PO) Take 1 tablet by mouth daily       No Known Allergies       Lab Results    Chemistry/lipid CBC Cardiac Enzymes/BNP/TSH/INR   Recent Labs   Lab Test 11/03/21  0939   CHOL 112   HDL 49   LDL 34   TRIG 146     Recent Labs   Lab Test 11/03/21  0939 09/18/20  0917 01/27/20  0849   LDL 34 77 101*     Recent Labs   Lab Test 11/03/21  0939      POTASSIUM 4.7   CHLORIDE 105   CO2 32   *   BUN 15   CR 0.94   GFRESTIMATED 82   KOKO 9.3     Recent Labs   Lab Test 11/03/21  0939 09/18/20  0917 06/19/19  0919   CR 0.94 1.01 1.05     No results for input(s): A1C in the last 21936 hours.       Recent Labs   Lab Test 11/03/21  0939   WBC 7.1   HGB 15.9   HCT 45.1   MCV 90        Recent Labs   Lab Test 11/03/21  0939 05/04/18  0503 04/16/18  1000   HGB 15.9 12.8* 16.2    No results for input(s): TROPONINI in the last 38812 hours.  Recent Labs   Lab Test 01/27/20  0849   NTBNP 1,325*     Recent Labs   Lab Test 01/26/18  1320   TSH 1.55     Recent Labs   Lab Test 05/03/18  0645 04/16/18  1000   INR 1.12* 1.07        Medical History  Surgical History Family History Social History   Past Medical History:   Diagnosis Date     Arthritis 2004    R knee 2018 replaced, Thumbs     Atrial fibrillation (H)      Atrial fibrillation (H) 01/2018     pre surgical for prostate cancer     Hypertension      Hypertension 2004     Prostate cancer (H) 2018     Prostate cancer (H) 2018     Restless legs 2014     only at night time     Past Surgical History:   Procedure Laterality Date     ANTERIOR CRUCIATE LIGAMENT REPAIR Right 2018     APPENDECTOMY       APPENDECTOMY  1969     C TOTAL KNEE ARTHROPLASTY Right 5/3/2018    Procedure: RIGHT TOTAL KNEE ARTHROPLASTY, COMPUTER-ASSIST;   Surgeon: Twan Yin MD;  Location: [facility] Main OR;  Service: Orthopedics     [place] LAP,PROSTATECTOMY,RADICAL,W/NERVE SPARE,INCL ROBOTIC Bilateral 2018    Procedure: ROBOTIC ASSISTED RADICAL RETROPUBIC PROSTATECTOMY BILATERAL PELVIC LYMPH NODE DISSECTION LYSIS OF ADHESIONS;  Surgeon: Wm Koo MD;  Location: [facility] Main OR;  Service: Urology     TN TOTAL KNEE ARTHROPLASTY  2018    right     PROSTATECTOMY  2018     PROSTATECTOMY, LYMPHADENECTOMY  2018     REMOVE HARDWARE LOWER EXTREMITY Right 5/3/2018    Procedure: HARDWARE REMOVAL RIGHT KNEE, LATERAL RELEASE;  Surgeon: Twan Yin MD;  Location: [facility] Main OR;  Service:      SHOULDER SURGERY  1985    left     SHOULDER SURGERY Left      TONSILLECTOMY       TONSILLECTOMY       Family History   Problem Relation Age of Onset     Leukemia Mother      Pulmonary fibrosis Father      Hypertension Brother      Hypertension Brother      Hyperlipidemia Brother      Hypertension Father      Cancer Maternal Aunt         bone cancer        Social History     Socioeconomic History     Marital status:      Spouse name: Not on file     Number of children: Not on file     Years of education: Not on file     Highest education level: Not on file   Occupational History     Not on file   Tobacco Use     Smoking status: Former Smoker     Packs/day: 0.50     Years: 3.00     Pack years: 1.50     Types: Cigarettes     Quit date: 1975     Years since quittin.8     Smokeless tobacco: Never Used     Tobacco comment: Smoked during college   Substance and Sexual Activity     Alcohol use: Yes     Alcohol/week: 1.0 standard drink     Types: 1 Glasses of wine per week     Comment: 5 days/week     Drug use: No     Sexual activity: Yes     Partners: Female   Other Topics Concern     Not on file   Social History Narrative     Not on file     Social Determinants of Health     Financial Resource Strain: Not on file   Food Insecurity: Not on file    Transportation Needs: Not on file   Physical Activity: Not on file   Stress: Not on file   Social Connections: Not on file   Intimate Partner Violence: Not on file   Housing Stability: Not on file

## 2021-11-10 NOTE — TELEPHONE ENCOUNTER
Prescription approved per [facility] Refill Protocol.  Luis Manuel Zavala RN

## 2021-11-11 NOTE — PROGRESS NOTES
[facility] HEART CARE   [address], [place] [address]   [email or web address]   OFFICE: [phone]          Thank you Vivi Machado for asking the [facility] Heart Care team to participate in the care of your patient, Rogers Huffman.     Impression and Plan     1.  Atrial fibrillation (persistent).  Patient with diagnosis of atrial fibrillation earlier this year of uncertain duration and frequency. Holter monitor 2 March 2018 revealed some mild tendency toward increased ventricular response. His metoprolol was increased from 50 mg twice a day to 100 mg twice a day. Follow-up Holter monitor 6 April 2018 revealed well controlled ventricular response. Patient is completely unaware of the rhythm disturbance.      I suspect that his atrial fibrillation is indeed paroxysmal.  Given he is completely asymptomatic with the rhythm disturbance, aggressive measures to maintain sinus rhythm have not been pursued.  This in part is in accordance with patient wishes.  Quentin has noted some slight increase in heart rates and brought in a chart of his readings.  His metoprolol was increased to 200 mg daily in January 2021.     Rogers' [id number] Score is 2 yielding an annual embolic risk of 2.2-2.9%. Rogers was started on apixaban (Eliquis ) for CVA prophylaxis at time of original diagnosis. Recommended:  Continue metoprolol succinate 200 mg daily.  In addition, will initiate Cardizem  mg daily.  Continue apixaban (Eliquis ) for CVA prophylaxis.     2.  Hypertension.  Blood pressure is very reasonable in the office today at 108/82 mmHg.    Plan on follow-up in 1 year.    30 minutes spent reviewing prior records (including documentation, laboratory studies, cardiac testing/imaging), interview with patient along with physical exam, planning, and subsequent documentation/crafting of note.           History of Present Illness    Once again I would like to thank you again for  asking me to participate in the care of your patient, Rogers Huffman.  As you know, but to reiterate for my own records, Rogers Huffman is a 71 year old male who had initially been seen in the clinic for preoperative evaluation in anticipation of prostate surgery.  His ECG revealed evidence of atrial fibrillation.  Patient, however, was completely unaware of the rhythm disturbance.     At time of initial consultation, Rogers did indicate that he had been told in the past that he has had some irregular in his heart rhythm though has never been actually diagnosed with atrial fibrillation.  Patient reports no concerning cardiac symptoms.  He specifically denies chest pain, shortness of breath, or any subjective decline in exercise tolerance.  He remains essentially completely asymptomatic with the rhythm disturbance.    Rogers has been maintained on apixaban (Eliquis ) for CVA prophylaxis.    Further review of systems is otherwise negative/noncontributory (medical record and 13 point review of systems reviewed as well and pertinent positives noted).         Cardiac Diagnostics      Echocardiogram 30 January 2018:  Normal left ventricular size and systolic performance with ejection fraction of 60-65%.  No significant valvular heart disease.  Normal right ventricular size and systolic performance.  Moderate left atrial enlargement. Mild right atrial enlargement.    Holter monitor 6 April 2018:  Abnormal Holter monitor tracing by virtue of the persistent atrial fibrillation demonstrated throughout the recording interval.  The ventricular response appears to be well controlled.  No symptomatic recordings therefore correlation with patient's clinical events cannot be made.  No significant ventricular arrhythmia  No profound bradycardia or pauses    Holter monitor 2 March 2018:  Persistent atrial fibrillation with some mild tendency towards rapid ventricular response during daytime hours.    12-lead ECG (personally reviewed)  26 January 2018: Sinus rhythm with heart rate of 69 bpm.  Solitary PVC.     12-lead ECG (personally reviewed) 24 January 2018: Atrial fibrillation.  Cannot rule out inferior infarction.            Physical Examination       /82 (BP Location: Right arm, Patient Position: Sitting, Cuff Size: Adult Large)   Pulse 80   Resp 16   Ht 1.829 m (6')   Wt 107.2 kg (236 lb 6.4 oz)   BMI 32.06 kg/m          Wt Readings from Last 3 Encounters:   11/10/21 107.2 kg (236 lb 6.4 oz)   11/01/21 105.7 kg (233 lb)   03/25/21 106.6 kg (235 lb)       The patient is alert and oriented times three. Sclerae are anicteric. Mucosal membranes are moist. Jugular venous pressure is normal. No significant adenopathy/thyromegally appreciated. Lungs are clear with good expansion. On cardiovascular exam, the patient has an irregular S1 and S2. Abdomen is soft and non-tender. Extremities reveal no clubbing, cyanosis, or edema.         Medications  Allergies   Current Outpatient Medications   Medication Sig Dispense Refill     atorvastatin (LIPITOR) 10 MG tablet Take 1 tablet (10 mg) by mouth daily 90 tablet 0     cholecalciferol (VITAMIN D3) 125 MCG (5000 UT) TABS tablet Take 5,000 Units by mouth daily       diltiazem ER COATED BEADS (CARDIZEM CD/CARTIA XT) 120 MG 24 hr capsule Take 120 mg by mouth daily        ELIQUIS ANTICOAGULANT 5 MG tablet Take 1 tablet by mouth twice daily 180 tablet 0     fish oil-omega-3 fatty acids 1000 MG capsule Take 2 g by mouth daily       magnesium 250 MG tablet Take 1 tablet by mouth daily        metoprolol succinate ER (TOPROL-XL) 100 MG 24 hr tablet Take 2 tablets (200 mg) by mouth daily 60 tablet 0     multivitamin (CENTRUM SILVER) tablet Take 1 tablet by mouth daily        pramipexole (MIRAPEX) 0.5 MG tablet Take 1.5 tablets (0.75 mg) by mouth At Bedtime 135 tablet 3     sildenafil (REVATIO) 20 MG tablet Take 20 mg by mouth daily as needed        Turmeric (CURCUMIN 95 PO) Take 1 tablet by mouth daily        No Known Allergies       Lab Results    Chemistry/lipid CBC Cardiac Enzymes/BNP/TSH/INR   Recent Labs   Lab Test 11/03/21  0939   CHOL 112   HDL 49   LDL 34   TRIG 146     Recent Labs   Lab Test 11/03/21  0939 09/18/20  0917 01/27/20  0849   LDL 34 77 101*     Recent Labs   Lab Test 11/03/21  0939      POTASSIUM 4.7   CHLORIDE 105   CO2 32   *   BUN 15   CR 0.94   GFRESTIMATED 82   KOKO 9.3     Recent Labs   Lab Test 11/03/21  0939 09/18/20  0917 06/19/19  0919   CR 0.94 1.01 1.05     No results for input(s): A1C in the last 92521 hours.       Recent Labs   Lab Test 11/03/21  0939   WBC 7.1   HGB 15.9   HCT 45.1   MCV 90        Recent Labs   Lab Test 11/03/21  0939 05/04/18  0503 04/16/18  1000   HGB 15.9 12.8* 16.2    No results for input(s): TROPONINI in the last 49734 hours.  Recent Labs   Lab Test 01/27/20  0849   NTBNP 1,325*     Recent Labs   Lab Test 01/26/18  1320   TSH 1.55     Recent Labs   Lab Test 05/03/18  0645 04/16/18  1000   INR 1.12* 1.07        Medical History  Surgical History Family History Social History   Past Medical History:   Diagnosis Date     Arthritis 2004    R knee 2018 replaced, Thumbs     Atrial fibrillation (H)      Atrial fibrillation (H) 01/2018     pre surgical for prostate cancer     Hypertension      Hypertension 2004     Prostate cancer (H) 2018     Prostate cancer (H) 2018     Restless legs 2014     only at night time     Past Surgical History:   Procedure Laterality Date     ANTERIOR CRUCIATE LIGAMENT REPAIR Right 2018     APPENDECTOMY       APPENDECTOMY  1969     C TOTAL KNEE ARTHROPLASTY Right 5/3/2018    Procedure: RIGHT TOTAL KNEE ARTHROPLASTY, COMPUTER-ASSIST;  Surgeon: Twan Yin MD;  Location: [facility] OR;  Service: Orthopedics     [place] LAP,PROSTATECTOMY,RADICAL,W/NERVE SPARE,INCL ROBOTIC Bilateral 2/6/2018    Procedure: ROBOTIC ASSISTED RADICAL RETROPUBIC PROSTATECTOMY BILATERAL PELVIC LYMPH NODE DISSECTION LYSIS OF ADHESIONS;   Surgeon: Wm Koo MD;  Location: [facility] Main OR;  Service: Urology     OR TOTAL KNEE ARTHROPLASTY  2018    right     PROSTATECTOMY  2018     PROSTATECTOMY, LYMPHADENECTOMY  2018     REMOVE HARDWARE LOWER EXTREMITY Right 5/3/2018    Procedure: HARDWARE REMOVAL RIGHT KNEE, LATERAL RELEASE;  Surgeon: Twan Yin MD;  Location: [facility] Main OR;  Service:      SHOULDER SURGERY  1985    left     SHOULDER SURGERY Left      TONSILLECTOMY       TONSILLECTOMY       Family History   Problem Relation Age of Onset     Leukemia Mother      Pulmonary fibrosis Father      Hypertension Brother      Hypertension Brother      Hyperlipidemia Brother      Hypertension Father      Cancer Maternal Aunt         bone cancer        Social History     Socioeconomic History     Marital status:      Spouse name: Not on file     Number of children: Not on file     Years of education: Not on file     Highest education level: Not on file   Occupational History     Not on file   Tobacco Use     Smoking status: Former Smoker     Packs/day: 0.50     Years: 3.00     Pack years: 1.50     Types: Cigarettes     Quit date: 1975     Years since quittin.8     Smokeless tobacco: Never Used     Tobacco comment: Smoked during college   Substance and Sexual Activity     Alcohol use: Yes     Alcohol/week: 1.0 standard drink     Types: 1 Glasses of wine per week     Comment: 5 days/week     Drug use: No     Sexual activity: Yes     Partners: Female   Other Topics Concern     Not on file   Social History Narrative     Not on file     Social Determinants of Health     Financial Resource Strain: Not on file   Food Insecurity: Not on file   Transportation Needs: Not on file   Physical Activity: Not on file   Stress: Not on file   Social Connections: Not on file   Intimate Partner Violence: Not on file   Housing Stability: Not on file

## 2021-11-21 ENCOUNTER — MYC MEDICAL ADVICE (OUTPATIENT)
Dept: FAMILY MEDICINE | Facility: CLINIC | Age: 71
End: 2021-11-21
Payer: COMMERCIAL

## 2021-11-21 DIAGNOSIS — I48.0 PAROXYSMAL ATRIAL FIBRILLATION (H): ICD-10-CM

## 2021-11-22 RX ORDER — METOPROLOL SUCCINATE 100 MG/1
200 TABLET, EXTENDED RELEASE ORAL DAILY
Qty: 180 TABLET | Refills: 3 | Status: SHIPPED | OUTPATIENT
Start: 2021-11-22 | End: 2022-11-21

## 2021-12-18 DIAGNOSIS — E78.5 HYPERLIPIDEMIA LDL GOAL <100: ICD-10-CM

## 2021-12-18 DIAGNOSIS — I10 BENIGN ESSENTIAL HYPERTENSION: ICD-10-CM

## 2021-12-20 RX ORDER — ATORVASTATIN CALCIUM 10 MG/1
TABLET, FILM COATED ORAL
Qty: 90 TABLET | Refills: 2 | Status: SHIPPED | OUTPATIENT
Start: 2021-12-20 | End: 2022-09-20

## 2021-12-20 NOTE — TELEPHONE ENCOUNTER
Prescription approved per [facility] Refill Protocol.  Luis Manuel Zavala RN

## 2022-03-24 DIAGNOSIS — I48.19 PERSISTENT ATRIAL FIBRILLATION (H): Primary | ICD-10-CM

## 2022-03-24 RX ORDER — DILTIAZEM HYDROCHLORIDE 120 MG/1
120 CAPSULE, COATED, EXTENDED RELEASE ORAL DAILY
Qty: 90 CAPSULE | Refills: 1 | Status: SHIPPED | OUTPATIENT
Start: 2022-03-24 | End: 2022-09-20

## 2022-07-29 ENCOUNTER — TRANSFERRED RECORDS (OUTPATIENT)
Dept: FAMILY MEDICINE | Facility: CLINIC | Age: 72
End: 2022-07-29

## 2022-08-16 ASSESSMENT — ENCOUNTER SYMPTOMS
NERVOUS/ANXIOUS: 0
MYALGIAS: 0
ARTHRALGIAS: 1
HEADACHES: 0
SHORTNESS OF BREATH: 0
PARESTHESIAS: 0
CONSTIPATION: 0
DIARRHEA: 0
COUGH: 0
DIZZINESS: 0
FEVER: 0
HEMATURIA: 0
EYE PAIN: 0
DYSURIA: 0
ABDOMINAL PAIN: 0
FREQUENCY: 0
SORE THROAT: 0
NAUSEA: 0
WEAKNESS: 0
JOINT SWELLING: 1
HEARTBURN: 0
HEMATOCHEZIA: 0
PALPITATIONS: 0
CHILLS: 0

## 2022-08-16 ASSESSMENT — ACTIVITIES OF DAILY LIVING (ADL): CURRENT_FUNCTION: NO ASSISTANCE NEEDED

## 2022-08-22 PROBLEM — Z98.49 POSTOPERATIVE CARE FOR CATARACT: Status: ACTIVE | Noted: 2018-02-15

## 2022-08-22 PROBLEM — Z98.890 HISTORY OF GENITOURINARY SURGICAL PROCEDURE: Status: ACTIVE | Noted: 2018-02-15

## 2022-08-22 PROBLEM — N39.3 STRESS INCONTINENCE: Status: ACTIVE | Noted: 2018-05-16

## 2022-08-22 PROBLEM — Z48.810 POSTOPERATIVE CARE FOR CATARACT: Status: ACTIVE | Noted: 2018-02-15

## 2022-08-22 PROBLEM — H61.20 COMPLAINT OF WAX IN EAR: Status: ACTIVE | Noted: 2022-01-01

## 2022-08-22 PROBLEM — R97.20 RAISED PROSTATE SPECIFIC ANTIGEN: Status: ACTIVE | Noted: 2017-10-23

## 2022-08-22 PROBLEM — N52.31 ERECTILE DYSFUNCTION FOLLOWING RADICAL PROSTATECTOMY: Status: ACTIVE | Noted: 2018-05-16

## 2022-08-22 PROBLEM — Z87.448 HISTORY OF GENITOURINARY SURGICAL PROCEDURE: Status: ACTIVE | Noted: 2018-02-15

## 2022-08-22 PROBLEM — Z85.46 HISTORY OF MALIGNANT NEOPLASM OF PROSTATE: Status: ACTIVE | Noted: 2018-05-21

## 2022-08-23 ENCOUNTER — OFFICE VISIT (OUTPATIENT)
Dept: FAMILY MEDICINE | Facility: CLINIC | Age: 72
End: 2022-08-23
Payer: COMMERCIAL

## 2022-08-23 VITALS
TEMPERATURE: 97.7 F | OXYGEN SATURATION: 96 % | WEIGHT: 233.13 LBS | SYSTOLIC BLOOD PRESSURE: 130 MMHG | BODY MASS INDEX: 31.58 KG/M2 | DIASTOLIC BLOOD PRESSURE: 88 MMHG | RESPIRATION RATE: 16 BRPM | HEART RATE: 90 BPM | HEIGHT: 72 IN

## 2022-08-23 DIAGNOSIS — C61 PROSTATE CANCER (H): ICD-10-CM

## 2022-08-23 DIAGNOSIS — E55.9 HYPOVITAMINOSIS D: ICD-10-CM

## 2022-08-23 DIAGNOSIS — Z13.220 LIPID SCREENING: ICD-10-CM

## 2022-08-23 DIAGNOSIS — Z13.6 SCREENING FOR AAA (ABDOMINAL AORTIC ANEURYSM): ICD-10-CM

## 2022-08-23 DIAGNOSIS — I48.19 PERSISTENT ATRIAL FIBRILLATION (H): ICD-10-CM

## 2022-08-23 DIAGNOSIS — Z12.5 SCREENING FOR PROSTATE CANCER: ICD-10-CM

## 2022-08-23 DIAGNOSIS — N52.9 ERECTILE DYSFUNCTION, UNSPECIFIED ERECTILE DYSFUNCTION TYPE: ICD-10-CM

## 2022-08-23 DIAGNOSIS — E83.42 HYPOMAGNESEMIA: ICD-10-CM

## 2022-08-23 DIAGNOSIS — I10 BENIGN ESSENTIAL HYPERTENSION: ICD-10-CM

## 2022-08-23 DIAGNOSIS — Z00.00 ENCOUNTER FOR MEDICARE ANNUAL WELLNESS EXAM: Primary | ICD-10-CM

## 2022-08-23 DIAGNOSIS — E78.5 HYPERLIPIDEMIA LDL GOAL <100: ICD-10-CM

## 2022-08-23 LAB
ANION GAP SERPL CALCULATED.3IONS-SCNC: 5 MMOL/L (ref 3–14)
BUN SERPL-MCNC: 22 MG/DL (ref 7–30)
CALCIUM SERPL-MCNC: 9.1 MG/DL (ref 8.5–10.1)
CHLORIDE BLD-SCNC: 108 MMOL/L (ref 94–109)
CHOLEST SERPL-MCNC: 115 MG/DL
CO2 SERPL-SCNC: 28 MMOL/L (ref 20–32)
CREAT SERPL-MCNC: 0.9 MG/DL (ref 0.66–1.25)
FASTING STATUS PATIENT QL REPORTED: YES
GFR SERPL CREATININE-BSD FRML MDRD: >90 ML/MIN/1.73M2
GLUCOSE BLD-MCNC: 98 MG/DL (ref 70–99)
HDLC SERPL-MCNC: 43 MG/DL
LDLC SERPL CALC-MCNC: 52 MG/DL
MAGNESIUM SERPL-MCNC: 1.9 MG/DL (ref 1.6–2.3)
NONHDLC SERPL-MCNC: 72 MG/DL
POTASSIUM BLD-SCNC: 4.6 MMOL/L (ref 3.4–5.3)
PSA SERPL-MCNC: <0.01 UG/L (ref 0–4)
SODIUM SERPL-SCNC: 141 MMOL/L (ref 133–144)
TRIGL SERPL-MCNC: 101 MG/DL

## 2022-08-23 PROCEDURE — 80061 LIPID PANEL: CPT | Performed by: FAMILY MEDICINE

## 2022-08-23 PROCEDURE — 80048 BASIC METABOLIC PNL TOTAL CA: CPT | Performed by: FAMILY MEDICINE

## 2022-08-23 PROCEDURE — G0103 PSA SCREENING: HCPCS | Performed by: FAMILY MEDICINE

## 2022-08-23 PROCEDURE — 82306 VITAMIN D 25 HYDROXY: CPT | Performed by: FAMILY MEDICINE

## 2022-08-23 PROCEDURE — 36415 COLL VENOUS BLD VENIPUNCTURE: CPT | Performed by: FAMILY MEDICINE

## 2022-08-23 PROCEDURE — 83735 ASSAY OF MAGNESIUM: CPT | Performed by: FAMILY MEDICINE

## 2022-08-23 PROCEDURE — 99213 OFFICE O/P EST LOW 20 MIN: CPT | Mod: 25 | Performed by: FAMILY MEDICINE

## 2022-08-23 PROCEDURE — G0439 PPPS, SUBSEQ VISIT: HCPCS | Performed by: FAMILY MEDICINE

## 2022-08-23 RX ORDER — TADALAFIL 10 MG/1
10-20 TABLET ORAL DAILY PRN
Qty: 30 TABLET | Refills: 0 | Status: SHIPPED | OUTPATIENT
Start: 2022-08-23

## 2022-08-23 ASSESSMENT — ENCOUNTER SYMPTOMS
HEARTBURN: 0
CHILLS: 0
NERVOUS/ANXIOUS: 0
FREQUENCY: 0
HEMATOCHEZIA: 0
ABDOMINAL PAIN: 0
PALPITATIONS: 0
DIARRHEA: 0
SHORTNESS OF BREATH: 0
NAUSEA: 0
DIZZINESS: 0
SORE THROAT: 0
MYALGIAS: 0
JOINT SWELLING: 1
EYE PAIN: 0
PARESTHESIAS: 0
HEMATURIA: 0
CONSTIPATION: 0
FEVER: 0
HEADACHES: 0
WEAKNESS: 0
ARTHRALGIAS: 1
COUGH: 0
DYSURIA: 0

## 2022-08-23 ASSESSMENT — ACTIVITIES OF DAILY LIVING (ADL): CURRENT_FUNCTION: NO ASSISTANCE NEEDED

## 2022-08-23 NOTE — PATIENT INSTRUCTIONS
Patient Education   Personalized Prevention Plan  You are due for the preventive services outlined below.  Your care team is available to assist you in scheduling these services.  If you have already completed any of these items, please share that information with your care team to update in your medical record.  Health Maintenance Due   Topic Date Due     AORTIC ANEURYSM SCREENING (SYSTEM ASSIGNED)  Never done

## 2022-08-23 NOTE — PROGRESS NOTES
"SUBJECTIVE:   Rogers Huffman is a 72 year old male who presents for Preventive Visit.    Are you in the first 12 months of your Medicare coverage?  No    Healthy Habits:     In general, how would you rate your overall health?  Good    Frequency of exercise:  4-5 days/week    Duration of exercise:  45-60 minutes    Do you usually eat at least 4 servings of fruit and vegetables a day, include whole grains    & fiber and avoid regularly eating high fat or \"junk\" foods?  Yes    Taking medications regularly:  Yes    Medication side effects:  None    Ability to successfully perform activities of daily living:  No assistance needed    Home Safety:  No safety concerns identified    Hearing Impairment:  Difficulty following a conversation in a noisy restaurant or crowded room, need to ask people to speak up or repeat themselves and difficulty understanding soft or whispered speech    In the past 6 months, have you been bothered by leaking of urine?  No    In general, how would you rate your overall mental or emotional health?  Good      PHQ-2 Total Score: 0    Additional concerns today:  No    Do you feel safe in your environment? Yes    Have you ever done Advance Care Planning? (For example, a Health Directive, POLST, or a discussion with a medical provider or your loved ones about your wishes): Yes, advance care planning is on file.    Fall risk  Fallen 2 or more times in the past year?: No  Any fall with injury in the past year?: No    Cognitive Screening   1) Repeat 3 items (Leader, Season, Table)    2) Clock draw: NORMAL  3) 3 item recall: Recalls 3 objects  Results: 3 items recalled: COGNITIVE IMPAIRMENT LESS LIKELY    Mini-CogTM Copyright JOSSIE Johnson. Licensed by the author for use in [facility]; reprinted with permission (martin@.[facility]). All rights reserved.      Do you have sleep apnea, excessive snoring or daytime drowsiness?: no    Reviewed and updated as needed this visit by clinical staff   Tobacco  " Allergies  Meds                Reviewed and updated as needed this visit by Provider                   Social History     Tobacco Use     Smoking status: Former Smoker     Packs/day: 0.50     Years: 3.00     Pack years: 1.50     Types: Cigarettes     Quit date: 1975     Years since quittin.6     Smokeless tobacco: Never Used     Tobacco comment: Smoked during college   Substance Use Topics     Alcohol use: Yes     Alcohol/week: 1.0 standard drink     Types: 1 Glasses of wine per week     Comment: 5 days/week         Alcohol Use 2022   Prescreen: >3 drinks/day or >7 drinks/week? No               Current providers sharing in care for this patient include:   Patient Care Team:  Vivi Miguel MD as PCP - General (Family Practice)  Rakel Joseph MD as Assigned Heart and Vascular Provider  Vivi Miguel MD as Assigned PCP    The following health maintenance items are reviewed in Epic and correct as of today:  Health Maintenance Due   Topic Date Due     AORTIC ANEURYSM SCREENING (SYSTEM ASSIGNED)  Never done     INFLUENZA VACCINE (1) 2022               Review of Systems   Constitutional: Negative for chills and fever.   HENT: Positive for hearing loss. Negative for congestion, ear pain and sore throat.    Eyes: Negative for pain and visual disturbance.   Respiratory: Negative for cough and shortness of breath.    Cardiovascular: Negative for chest pain, palpitations and peripheral edema.   Gastrointestinal: Negative for abdominal pain, constipation, diarrhea, heartburn, hematochezia and nausea.   Genitourinary: Positive for impotence. Negative for dysuria, frequency, genital sores, hematuria, penile discharge and urgency.   Musculoskeletal: Positive for arthralgias and joint swelling. Negative for myalgias.   Skin: Negative for rash.   Neurological: Negative for dizziness, weakness, headaches and paresthesias.   Psychiatric/Behavioral: Negative for mood changes. The  "patient is not nervous/anxious.          OBJECTIVE:   /88   Pulse 90   Temp 97.7  F (36.5  C) (Tympanic)   Resp 16   Ht 1.816 m (5' 11.5\")   Wt 105.7 kg (233 lb 2 oz)   SpO2 96%   BMI 32.06 kg/m   Estimated body mass index is 32.06 kg/m  as calculated from the following:    Height as of this encounter: 1.816 m (5' 11.5\").    Weight as of this encounter: 105.7 kg (233 lb 2 oz).  Physical Exam  Objective:  Vital signs reviewed and stable  General: No acute distress  Psych: Appropriate affect  HEENT: moist mucous membranes, pupils equal, round, reactive to light and accomodation, tympanic membranes are pearly grey bilaterally  Lymph: no cervical or supraclavicular lymphadenopathy  Cardiovascular: regular rate and irregularly irregular rhythm with no murmur  Pulmonary: clear to auscultation bilaterally with no wheeze  Abdomen: soft, non tender, non distended with normo-active bowel sounds  Extremities: warm and well perfused with no edema  Skin: warm and dry with no rash      ASSESSMENT / PLAN:   1. Encounter for Medicare annual wellness exam  Overall doing well.  Enjoying the winter condo they purchased in [place] and is planning on going back the mid fall of this year.  - REVIEW OF HEALTH MAINTENANCE PROTOCOL ORDERS    2. Screening for AAA (abdominal aortic aneurysm)  - US Aorta Medicare AAA Screening; Future    3. Screening for prostate cancer  History of prostate cancer status post surgery  - PROSTATE SPEC ANTIGEN SCREEN; Future  - PROSTATE SPEC ANTIGEN SCREEN    4. Lipid screening    5. Hyperlipidemia LDL goal <100  - Lipid panel reflex to direct LDL Fasting; Future  - Lipid panel reflex to direct LDL Fasting    6. Benign essential hypertension  At goal  - BASIC METABOLIC PANEL; Future  - BASIC METABOLIC PANEL    7. Hypovitaminosis D  - Vitamin D Deficiency; Future  - Vitamin D Deficiency    8. Hypomagnesemia  - Magnesium; Future  - Magnesium    9. Erectile dysfunction, unspecified erectile dysfunction " "type  Since prostatectomy.  Follows with urology yearly and plan to discussing with them as well.  Viagra is not very effective.  We will trial Cialis although discussed that it is a similar medication  - tadalafil (CIALIS) 10 MG tablet; Take 1-2 tablets (10-20 mg) by mouth daily as needed  Dispense: 30 tablet; Refill: 0    10. Persistent atrial fibrillation (H)  Follows with cardiology yearly.  No concerns    11. Prostate cancer (H)  Status post prostatectomy.  Follows with urology yearly.  PSA done today      Patient has been advised of split billing requirements and indicates understanding: Yes    COUNSELING:  Reviewed preventive health counseling, as reflected in patient instructions    Estimated body mass index is 32.06 kg/m  as calculated from the following:    Height as of this encounter: 1.816 m (5' 11.5\").    Weight as of this encounter: 105.7 kg (233 lb 2 oz).    Weight management plan: Discussed healthy diet and exercise guidelines    He reports that he quit smoking about 47 years ago. His smoking use included cigarettes. He has a 1.50 pack-year smoking history. He has never used smokeless tobacco.      Appropriate preventive services were discussed with this patient, including applicable screening as appropriate for cardiovascular disease, diabetes, osteopenia/osteoporosis, and glaucoma.  As appropriate for age/gender, discussed screening for colorectal cancer, prostate cancer, breast cancer, and cervical cancer. Checklist reviewing preventive services available has been given to the patient.    Reviewed patients plan of care and provided an AVS. The Basic Care Plan (routine screening as documented in Health Maintenance) for Rogers meets the Care Plan requirement. This Care Plan has been established and reviewed with the Patient.    Counseling Resources:  ATP IV Guidelines  Pooled Cohorts Equation Calculator  Breast Cancer Risk Calculator  Breast Cancer: Medication to Reduce Risk  FRAX Risk " Assessment  ICSI Preventive Guidelines  Dietary Guidelines for Americans, 2010  USDA's MyPlate  ASA Prophylaxis  Lung CA Screening    Vivi Miguel MD  [facility]    Identified Health Risks:   Admission Reconciliation is Completed  Discharge Reconciliation is Completed

## 2022-08-24 ENCOUNTER — TRANSFERRED RECORDS (OUTPATIENT)
Dept: HEALTH INFORMATION MANAGEMENT | Facility: CLINIC | Age: 72
End: 2022-08-24

## 2022-08-24 LAB — DEPRECATED CALCIDIOL+CALCIFEROL SERPL-MC: 71 UG/L (ref 20–75)

## 2022-08-31 ENCOUNTER — HOSPITAL ENCOUNTER (OUTPATIENT)
Dept: ULTRASOUND IMAGING | Facility: HOSPITAL | Age: 72
Discharge: HOME | End: 2022-08-31
Attending: FAMILY MEDICINE | Admitting: FAMILY MEDICINE
Payer: COMMERCIAL

## 2022-08-31 DIAGNOSIS — Z13.6 SCREENING FOR AAA (ABDOMINAL AORTIC ANEURYSM): ICD-10-CM

## 2022-08-31 PROCEDURE — 76706 US ABDL AORTA SCREEN AAA: CPT

## 2022-09-01 ENCOUNTER — TRANSFERRED RECORDS (OUTPATIENT)
Dept: FAMILY MEDICINE | Facility: CLINIC | Age: 72
End: 2022-09-01

## 2022-09-07 ENCOUNTER — TRANSFERRED RECORDS (OUTPATIENT)
Dept: HEALTH INFORMATION MANAGEMENT | Facility: CLINIC | Age: 72
End: 2022-09-07

## 2022-09-15 ENCOUNTER — TRANSFERRED RECORDS (OUTPATIENT)
Dept: HEALTH INFORMATION MANAGEMENT | Facility: CLINIC | Age: 72
End: 2022-09-15

## 2022-09-20 DIAGNOSIS — E78.5 HYPERLIPIDEMIA LDL GOAL <100: ICD-10-CM

## 2022-09-20 DIAGNOSIS — I10 BENIGN ESSENTIAL HYPERTENSION: ICD-10-CM

## 2022-09-20 RX ORDER — ATORVASTATIN CALCIUM 10 MG/1
TABLET, FILM COATED ORAL
Qty: 90 TABLET | Refills: 3 | Status: SHIPPED | OUTPATIENT
Start: 2022-09-20 | End: 2023-09-21

## 2022-09-26 ENCOUNTER — TRANSFERRED RECORDS (OUTPATIENT)
Dept: HEALTH INFORMATION MANAGEMENT | Facility: CLINIC | Age: 72
End: 2022-09-26

## 2022-10-17 ENCOUNTER — TRANSFERRED RECORDS (OUTPATIENT)
Dept: HEALTH INFORMATION MANAGEMENT | Facility: CLINIC | Age: 72
End: 2022-10-17

## 2022-11-20 ENCOUNTER — MYC MEDICAL ADVICE (OUTPATIENT)
Dept: FAMILY MEDICINE | Facility: CLINIC | Age: 72
End: 2022-11-20

## 2022-11-23 DIAGNOSIS — G25.81 RESTLESS LEGS SYNDROME (RLS): ICD-10-CM

## 2022-11-25 RX ORDER — PRAMIPEXOLE DIHYDROCHLORIDE 0.5 MG/1
TABLET ORAL
Qty: 135 TABLET | Refills: 0 | Status: SHIPPED | OUTPATIENT
Start: 2022-11-25 | End: 2023-02-26

## 2022-11-25 NOTE — TELEPHONE ENCOUNTER
Refilled per 90 day refill protocol, needs labs, reminder placed on pharmacy notes.      TANESHA Kitchen

## 2023-01-14 ENCOUNTER — MYC MEDICAL ADVICE (OUTPATIENT)
Dept: FAMILY MEDICINE | Facility: CLINIC | Age: 73
End: 2023-01-14
Payer: COMMERCIAL

## 2023-01-14 ENCOUNTER — HEALTH MAINTENANCE LETTER (OUTPATIENT)
Age: 73
End: 2023-01-14

## 2023-03-20 DIAGNOSIS — I10 ESSENTIAL HYPERTENSION: ICD-10-CM

## 2023-03-20 DIAGNOSIS — I48.19 PERSISTENT ATRIAL FIBRILLATION (H): Primary | ICD-10-CM

## 2023-05-19 DIAGNOSIS — G25.81 RESTLESS LEGS SYNDROME (RLS): ICD-10-CM

## 2023-05-20 DIAGNOSIS — I48.0 PAROXYSMAL ATRIAL FIBRILLATION (H): ICD-10-CM

## 2023-05-22 RX ORDER — PRAMIPEXOLE DIHYDROCHLORIDE 0.5 MG/1
TABLET ORAL
Qty: 135 TABLET | Refills: 0 | Status: SHIPPED | OUTPATIENT
Start: 2023-05-22 | End: 2023-08-09

## 2023-05-22 RX ORDER — METOPROLOL SUCCINATE 100 MG/1
TABLET, EXTENDED RELEASE ORAL
Qty: 180 TABLET | Refills: 0 | Status: SHIPPED | OUTPATIENT
Start: 2023-05-22 | End: 2023-08-09

## 2023-05-22 RX ORDER — APIXABAN 5 MG/1
TABLET, FILM COATED ORAL
Qty: 180 TABLET | Refills: 0 | Status: SHIPPED | OUTPATIENT
Start: 2023-05-22 | End: 2023-08-09

## 2023-05-22 NOTE — TELEPHONE ENCOUNTER
Routing refill request to provider for review/approval because:  Failed direct oral anticoagulant agent protocol: normal Plts in the past 12 months. Last PLT's done 11/3/21.  Uriel Singletary RN

## 2023-05-22 NOTE — TELEPHONE ENCOUNTER
Routing refill request to provider for review/approval because:  Failed Antiparkinsons agents protocol: Last CBC and ALT done 11/3/21.  Uriel Singletary RN

## 2023-06-10 DIAGNOSIS — I48.19 PERSISTENT ATRIAL FIBRILLATION (H): ICD-10-CM

## 2023-06-12 RX ORDER — DILTIAZEM HYDROCHLORIDE 120 MG/1
120 CAPSULE, COATED, EXTENDED RELEASE ORAL DAILY
Qty: 90 CAPSULE | Refills: 0 | Status: SHIPPED | OUTPATIENT
Start: 2023-06-12 | End: 2023-09-26

## 2023-06-23 ENCOUNTER — TELEPHONE (OUTPATIENT)
Dept: FAMILY MEDICINE | Facility: CLINIC | Age: 73
End: 2023-06-23
Payer: COMMERCIAL

## 2023-06-23 NOTE — TELEPHONE ENCOUNTER
Noted. Called back and spoke with Tori. VO okay for holding Eliquis for 2 days given.  She will let the patient know.  Shira Arevalo RN on 6/23/2023 at 2:55 PM

## 2023-06-23 NOTE — TELEPHONE ENCOUNTER
Order/Referral Request    Who is requesting: [facility]    Orders being requested: Needing order to hold Eliquis for 2 full days prior to colonoscopy on 7-26. If you would like less then a 2 day hold they will need recent creatinine     Reason service is needed/diagnosis: colonoscopy    When are orders needed by: asap    Has this been discussed with Provider: No    Does patient have a preference on a Group/Provider/Facility? [facility]    Does patient have an appointment scheduled?: Yes: 7-26    Where to send orders: verbal orders    LIVIA Valle [phone] ok to leave detailed message.

## 2023-07-03 ENCOUNTER — OFFICE VISIT (OUTPATIENT)
Dept: FAMILY MEDICINE | Facility: CLINIC | Age: 73
End: 2023-07-03
Payer: COMMERCIAL

## 2023-07-03 VITALS
OXYGEN SATURATION: 95 % | TEMPERATURE: 98 F | HEART RATE: 76 BPM | RESPIRATION RATE: 16 BRPM | HEIGHT: 72 IN | WEIGHT: 239.31 LBS | BODY MASS INDEX: 32.41 KG/M2 | SYSTOLIC BLOOD PRESSURE: 134 MMHG | DIASTOLIC BLOOD PRESSURE: 88 MMHG

## 2023-07-03 DIAGNOSIS — L03.011 PARONYCHIA OF FINGER OF RIGHT HAND: Primary | ICD-10-CM

## 2023-07-03 PROCEDURE — 99213 OFFICE O/P EST LOW 20 MIN: CPT | Performed by: FAMILY MEDICINE

## 2023-07-03 RX ORDER — CEPHALEXIN 500 MG/1
CAPSULE ORAL
COMMUNITY
Start: 2023-06-27 | End: 2023-09-20

## 2023-07-03 RX ORDER — CEPHALEXIN 500 MG/1
500 CAPSULE ORAL 4 TIMES DAILY
Qty: 40 CAPSULE | Refills: 0 | Status: SHIPPED | OUTPATIENT
Start: 2023-07-03 | End: 2023-09-20

## 2023-07-03 NOTE — PROGRESS NOTES
Assessment/Plan:    Rogers Huffman is a 72 year old male presenting for:    Paronychia of finger of right hand  This appears to be healing well.  No we will need to keep it covered with a bandage or bacitracin at this point.  Refill of Keflex sent to the pharmacy that he will  and keep on hand in case this flares up again before he can get into dermatology.  Referral for [facility] placed.  - Adult Dermatology Referral  - cephALEXin (KEFLEX) 500 MG capsule  Dispense: 40 capsule; Refill: 0      There are no discontinued medications.        Chief Complaint:  Follow Up        Subjective:   Rogers Huffman is a pleasant 72-year-old gentleman who presents to the clinic today as a emergency room follow-up.  Patient was seen in the emergency room and diagnosed with paronychia of his right ring finger.  He had had 2 days of some inflammation and he noticed that the swelling of his finger was going down past the second knuckle.  For that reason he was seen in the emergency room.      A digital block was done and a scalpel was used for an incision and drainage.  A small amount of pus and blood were removed and thickened cuticle tissue was noted.    Patient has not been on Keflex for 7 or 8 days.  He is doing well with the medication.  He notes that most of the swelling has dissipated.  He has 2 days left of the medication.    He is no specific questions or concerns but was told to come in for follow-up.  He wonders if there is anything he can do in the future to further prevent this.  He notes that the area has filled up several times and he has punctured it with a sterile needle in the past.    12 point review of systems completed and negative except for what has been described above    History   Smoking Status     Former     Packs/day: 0.50     Years: 3.00     Types: Cigarettes     Quit date: 1/1/1975   Smokeless Tobacco     Never     Comment: Smoked during college         Current Outpatient Medications:  "     amoxicillin (AMOXIL) 500 MG capsule, Take 4 capsules 30-60 minutes prior to a dental procedure, Disp: 12 capsule, Rfl: 0     atorvastatin (LIPITOR) 10 MG tablet, Take 1 tablet by mouth once daily, Disp: 90 tablet, Rfl: 3     cephALEXin (KEFLEX) 500 MG capsule, TAKE 1 CAPSULE BY MOUTH 4 TIMES DAILY FOR 7 DAYS, Disp: , Rfl:      cephALEXin (KEFLEX) 500 MG capsule, Take 1 capsule (500 mg) by mouth 4 times daily, Disp: 40 capsule, Rfl: 0     cholecalciferol (VITAMIN D3) 125 MCG (5000 UT) TABS tablet, Take 5,000 Units by mouth daily, Disp: , Rfl:      diltiazem ER COATED BEADS (CARDIZEM CD/CARTIA XT) 120 MG 24 hr capsule, Take 1 capsule (120 mg) by mouth daily -Needs to schedule cardiology follow-up appt for further refills, Disp: 90 capsule, Rfl: 0     ELIQUIS ANTICOAGULANT 5 MG tablet, Take 1 tablet by mouth twice daily, Disp: 180 tablet, Rfl: 0     fish oil-omega-3 fatty acids 1000 MG capsule, Take 2 g by mouth daily, Disp: , Rfl:      magnesium 250 MG tablet, Take 1 tablet by mouth daily , Disp: , Rfl:      metoprolol succinate ER (TOPROL XL) 100 MG 24 hr tablet, Take 2 tablets by mouth once daily, Disp: 180 tablet, Rfl: 0     multivitamin (CENTRUM SILVER) tablet, Take 1 tablet by mouth daily , Disp: , Rfl:      pramipexole (MIRAPEX) 0.5 MG tablet, TAKE 1 & 1/2 (ONE & ONE-HALF) TABLETS BY MOUTH AT BEDTIME (NEEDS  LABS), Disp: 135 tablet, Rfl: 0     sildenafil (REVATIO) 20 MG tablet, Take 20 mg by mouth daily as needed , Disp: , Rfl:      tadalafil (CIALIS) 10 MG tablet, Take 1-2 tablets (10-20 mg) by mouth daily as needed, Disp: 30 tablet, Rfl: 0      Objective:  Vitals:    07/03/23 1438 07/03/23 1515   BP: (!) 138/94 134/88   Pulse: 76    Resp: 16    Temp: 98  F (36.7  C)    TempSrc: Tympanic    SpO2: 95%    Weight: 108.6 kg (239 lb 5 oz)    Height: 1.816 m (5' 11.5\")        Body mass index is 32.91 kg/m .    Vital signs reviewed and stable  General: No acute distress  Psych: Appropriate affect  HEENT: moist " mucous membranes  Cardiovascular: regular rate and rhythm with no murmur  Pulmonary: clear to auscultation bilaterally with no wheeze  Abdomen: soft, non tender, non distended with normo-active bowel sounds  Extremities: warm and well perfused with no edema, focused examination of the right ring finger shows minimal swelling and very minimal redness with no further drainage around the nail.  Skin: warm and dry with no rash         This note has been dictated and transcribed using voice recognition software.   Any errors in transcription are unintentional and inherent to the software.

## 2023-07-13 ENCOUNTER — TRANSFERRED RECORDS (OUTPATIENT)
Dept: HEALTH INFORMATION MANAGEMENT | Facility: CLINIC | Age: 73
End: 2023-07-13
Payer: COMMERCIAL

## 2023-07-17 ENCOUNTER — TRANSFERRED RECORDS (OUTPATIENT)
Dept: HEALTH INFORMATION MANAGEMENT | Facility: CLINIC | Age: 73
End: 2023-07-17
Payer: COMMERCIAL

## 2023-07-26 ENCOUNTER — TRANSFERRED RECORDS (OUTPATIENT)
Dept: HEALTH INFORMATION MANAGEMENT | Facility: CLINIC | Age: 73
End: 2023-07-26
Payer: COMMERCIAL

## 2023-07-27 ENCOUNTER — TRANSFERRED RECORDS (OUTPATIENT)
Dept: HEALTH INFORMATION MANAGEMENT | Facility: CLINIC | Age: 73
End: 2023-07-27
Payer: COMMERCIAL

## 2023-08-07 ENCOUNTER — TRANSFERRED RECORDS (OUTPATIENT)
Dept: HEALTH INFORMATION MANAGEMENT | Facility: CLINIC | Age: 73
End: 2023-08-07
Payer: COMMERCIAL

## 2023-08-08 DIAGNOSIS — I48.0 PAROXYSMAL ATRIAL FIBRILLATION (H): ICD-10-CM

## 2023-08-08 DIAGNOSIS — G25.81 RESTLESS LEGS SYNDROME (RLS): ICD-10-CM

## 2023-08-09 RX ORDER — APIXABAN 5 MG/1
TABLET, FILM COATED ORAL
Qty: 180 TABLET | Refills: 0 | Status: SHIPPED | OUTPATIENT
Start: 2023-08-09 | End: 2023-11-20

## 2023-08-09 RX ORDER — PRAMIPEXOLE DIHYDROCHLORIDE 0.5 MG/1
TABLET ORAL
Qty: 135 TABLET | Refills: 0 | Status: SHIPPED | OUTPATIENT
Start: 2023-08-09 | End: 2023-11-20

## 2023-08-09 RX ORDER — METOPROLOL SUCCINATE 100 MG/1
TABLET, EXTENDED RELEASE ORAL
Qty: 180 TABLET | Refills: 0 | Status: SHIPPED | OUTPATIENT
Start: 2023-08-09 | End: 2023-11-27

## 2023-08-09 NOTE — TELEPHONE ENCOUNTER
"Routing refill request to provider for review/approval because:  PCP to fill.  Failed protocol.  Has appointment scheduled for 9/5/23                Requested Prescriptions   Pending Prescriptions Disp Refills    pramipexole (MIRAPEX) 0.5 MG tablet [Pharmacy Med Name: Pramipexole Dihydrochloride 0.5 MG Oral Tablet] 135 tablet 0     Sig: TAKE 1 & 1/2 (ONE & ONE-HALF) TABLETS BY MOUTH AT BEDTIME (NEEDS  LAB)       Antiparkinson's Agents Protocol Failed - 8/8/2023  8:33 PM        Failed - CBC on record in past 12 months     Recent Labs   Lab Test 11/03/21  0939   WBC 7.1   RBC 5.04   HGB 15.9   HCT 45.1                    Failed - ALT on record in past 12 months         Recent Labs   Lab Test 11/03/21  0939   ALT 32             Passed - Blood pressure under 140/90 in past 12 months     BP Readings from Last 3 Encounters:   07/03/23 134/88   08/23/22 130/88   11/10/21 108/82                 Passed - Serum Creatinine on file in past 12 months     Recent Labs   Lab Test 08/23/22  1124   CR 0.90       Ok to refill medication if creatinine is low          Passed - Medication is active on med list        Passed - Patient is age 18 or older        Passed - Recent (6 mo) or future (30 days) visit within the authorizing provider's specialty     Patient had office visit in the last 6 months or has a visit in the next 30 days with authorizing provider or within the authorizing provider's specialty.  See \"Patient Info\" tab in inbasket, or \"Choose Columns\" in Meds & Orders section of the refill encounter.              metoprolol succinate ER (TOPROL XL) 100 MG 24 hr tablet [Pharmacy Med Name: Metoprolol Succinate  MG Oral Tablet Extended Release 24 Hour] 180 tablet 0     Sig: Take 2 tablets by mouth once daily       Beta-Blockers Protocol Passed - 8/8/2023  8:33 PM        Passed - Blood pressure under 140/90 in past 12 months     BP Readings from Last 3 Encounters:   07/03/23 134/88   08/23/22 130/88   11/10/21 108/82 " "                Passed - Patient is age 6 or older        Passed - Recent (12 mo) or future (30 days) visit within the authorizing provider's specialty     Patient has had an office visit with the authorizing provider or a provider within the authorizing providers department within the previous 12 mos or has a future within next 30 days. See \"Patient Info\" tab in inbasket, or \"Choose Columns\" in Meds & Orders section of the refill encounter.              Passed - Medication is active on med list          ELIQUIS ANTICOAGULANT 5 MG tablet [Pharmacy Med Name: Eliquis 5 MG Oral Tablet] 180 tablet 0     Sig: Take 1 tablet by mouth twice daily       Direct Oral Anticoagulant Agents Failed - 8/8/2023  8:33 PM        Failed - Normal Platelets on file in past 12 months     Recent Labs   Lab Test 11/03/21  0939                  Passed - Medication is active on med list        Passed - Patient is 18-79 years of age        Passed - Serum creatinine less than or equal to 1.4 on file in past 12 mos     Recent Labs   Lab Test 08/23/22  1124   CR 0.90       Ok to refill medication if creatinine is low          Passed - Weight is greater than 60 kg for the past year     Wt Readings from Last 3 Encounters:   07/03/23 108.6 kg (239 lb 5 oz)   08/23/22 105.7 kg (233 lb 2 oz)   11/10/21 107.2 kg (236 lb 6.4 oz)             Passed - Recent (6 mo) or future (30 days) visit within the authorizing provider's specialty                 Uriel Singletary RN 08/09/23 9:25 AM   "

## 2023-08-29 PROBLEM — C61 MALIGNANT TUMOR OF PROSTATE (H): Status: ACTIVE | Noted: 2017-12-17

## 2023-08-29 PROBLEM — Z86.0100 HISTORY OF COLONIC POLYPS: Status: ACTIVE | Noted: 2019-09-26

## 2023-09-21 DIAGNOSIS — E78.5 HYPERLIPIDEMIA LDL GOAL <100: ICD-10-CM

## 2023-09-21 DIAGNOSIS — I10 BENIGN ESSENTIAL HYPERTENSION: ICD-10-CM

## 2023-09-21 RX ORDER — ATORVASTATIN CALCIUM 10 MG/1
TABLET, FILM COATED ORAL
Qty: 90 TABLET | Refills: 0 | Status: SHIPPED | OUTPATIENT
Start: 2023-09-21 | End: 2023-12-21

## 2023-09-22 ENCOUNTER — LAB (OUTPATIENT)
Dept: LAB | Facility: CLINIC | Age: 73
End: 2023-09-22
Payer: COMMERCIAL

## 2023-09-22 DIAGNOSIS — E78.5 HYPERLIPIDEMIA LDL GOAL <100: ICD-10-CM

## 2023-09-22 DIAGNOSIS — I48.0 PAROXYSMAL ATRIAL FIBRILLATION (H): ICD-10-CM

## 2023-09-22 DIAGNOSIS — Z00.00 HEALTHCARE MAINTENANCE: Primary | ICD-10-CM

## 2023-09-22 DIAGNOSIS — Z12.5 SCREENING FOR PROSTATE CANCER: ICD-10-CM

## 2023-09-22 DIAGNOSIS — E78.5 HYPERLIPIDEMIA LDL GOAL <100: Primary | ICD-10-CM

## 2023-09-22 LAB
ALBUMIN SERPL BCG-MCNC: 4.4 G/DL (ref 3.5–5.2)
ALP SERPL-CCNC: 111 U/L (ref 40–129)
ALT SERPL W P-5'-P-CCNC: 25 U/L (ref 0–70)
ANION GAP SERPL CALCULATED.3IONS-SCNC: 8 MMOL/L (ref 7–15)
AST SERPL W P-5'-P-CCNC: 27 U/L (ref 0–45)
BILIRUB SERPL-MCNC: 0.8 MG/DL
BUN SERPL-MCNC: 21.6 MG/DL (ref 8–23)
CALCIUM SERPL-MCNC: 9.8 MG/DL (ref 8.8–10.2)
CHLORIDE SERPL-SCNC: 104 MMOL/L (ref 98–107)
CHOLEST SERPL-MCNC: 109 MG/DL
CREAT SERPL-MCNC: 1.13 MG/DL (ref 0.67–1.17)
DEPRECATED HCO3 PLAS-SCNC: 29 MMOL/L (ref 22–29)
EGFRCR SERPLBLD CKD-EPI 2021: 69 ML/MIN/1.73M2
ERYTHROCYTE [DISTWIDTH] IN BLOOD BY AUTOMATED COUNT: 12.8 % (ref 10–15)
GLUCOSE SERPL-MCNC: 100 MG/DL (ref 70–99)
HBA1C MFR BLD: 5.8 % (ref 0–5.6)
HCT VFR BLD AUTO: 46.7 % (ref 40–53)
HDLC SERPL-MCNC: 43 MG/DL
HGB BLD-MCNC: 15.7 G/DL (ref 13.3–17.7)
LDLC SERPL CALC-MCNC: 42 MG/DL
MCH RBC QN AUTO: 30.8 PG (ref 26.5–33)
MCHC RBC AUTO-ENTMCNC: 33.6 G/DL (ref 31.5–36.5)
MCV RBC AUTO: 92 FL (ref 78–100)
NONHDLC SERPL-MCNC: 66 MG/DL
PLATELET # BLD AUTO: 195 10E3/UL (ref 150–450)
POTASSIUM SERPL-SCNC: 5.2 MMOL/L (ref 3.4–5.3)
PROT SERPL-MCNC: 6.8 G/DL (ref 6.4–8.3)
PSA SERPL DL<=0.01 NG/ML-MCNC: <0.01 NG/ML (ref 0–6.5)
RBC # BLD AUTO: 5.1 10E6/UL (ref 4.4–5.9)
SODIUM SERPL-SCNC: 141 MMOL/L (ref 136–145)
TRIGL SERPL-MCNC: 121 MG/DL
WBC # BLD AUTO: 7.3 10E3/UL (ref 4–11)

## 2023-09-22 PROCEDURE — 80061 LIPID PANEL: CPT

## 2023-09-22 PROCEDURE — G0103 PSA SCREENING: HCPCS

## 2023-09-22 PROCEDURE — 85027 COMPLETE CBC AUTOMATED: CPT

## 2023-09-22 PROCEDURE — 83036 HEMOGLOBIN GLYCOSYLATED A1C: CPT

## 2023-09-22 PROCEDURE — 36415 COLL VENOUS BLD VENIPUNCTURE: CPT

## 2023-09-22 PROCEDURE — 80053 COMPREHEN METABOLIC PANEL: CPT

## 2023-09-24 ENCOUNTER — HEALTH MAINTENANCE LETTER (OUTPATIENT)
Age: 73
End: 2023-09-24

## 2023-09-25 ENCOUNTER — MYC MEDICAL ADVICE (OUTPATIENT)
Dept: CARDIOLOGY | Facility: CLINIC | Age: 73
End: 2023-09-25
Payer: COMMERCIAL

## 2023-09-25 DIAGNOSIS — I48.19 PERSISTENT ATRIAL FIBRILLATION (H): ICD-10-CM

## 2023-09-25 ASSESSMENT — ENCOUNTER SYMPTOMS
PALPITATIONS: 0
HEADACHES: 0
ABDOMINAL PAIN: 0
HEMATOCHEZIA: 0
FREQUENCY: 0
EYE PAIN: 0
DIZZINESS: 0
SHORTNESS OF BREATH: 0
HEMATURIA: 0
JOINT SWELLING: 0
CONSTIPATION: 0
FEVER: 0
DIARRHEA: 0
ARTHRALGIAS: 0
COUGH: 0
HEARTBURN: 0
NAUSEA: 0
SORE THROAT: 0
CHILLS: 0
NERVOUS/ANXIOUS: 0
MYALGIAS: 0
DYSURIA: 0
PARESTHESIAS: 0
WEAKNESS: 0

## 2023-09-25 ASSESSMENT — ACTIVITIES OF DAILY LIVING (ADL): CURRENT_FUNCTION: NO ASSISTANCE NEEDED

## 2023-09-26 RX ORDER — DILTIAZEM HYDROCHLORIDE 120 MG/1
120 CAPSULE, COATED, EXTENDED RELEASE ORAL DAILY
Qty: 90 CAPSULE | Refills: 0 | Status: SHIPPED | OUTPATIENT
Start: 2023-09-26 | End: 2023-12-21

## 2023-09-26 NOTE — TELEPHONE ENCOUNTER
[employer] message sent.  --------------------------  Rakel Joseph MD  You18 hours ago (12:56 PM)     FR Lucas Castillo,  Please see note from Quentin below regarding diltiazem.  Would recommend he continue the diltiazem.  Can we make sure he has an updated prescription?.  Thanks.

## 2023-10-02 ENCOUNTER — OFFICE VISIT (OUTPATIENT)
Dept: FAMILY MEDICINE | Facility: CLINIC | Age: 73
End: 2023-10-02
Payer: COMMERCIAL

## 2023-10-02 VITALS
OXYGEN SATURATION: 96 % | BODY MASS INDEX: 33.39 KG/M2 | WEIGHT: 238.5 LBS | SYSTOLIC BLOOD PRESSURE: 132 MMHG | HEIGHT: 71 IN | RESPIRATION RATE: 16 BRPM | HEART RATE: 77 BPM | DIASTOLIC BLOOD PRESSURE: 84 MMHG | TEMPERATURE: 97.2 F

## 2023-10-02 DIAGNOSIS — Z00.00 ENCOUNTER FOR MEDICARE ANNUAL WELLNESS EXAM: Primary | ICD-10-CM

## 2023-10-02 DIAGNOSIS — C61 PROSTATE CANCER (H): ICD-10-CM

## 2023-10-02 DIAGNOSIS — R73.03 PRE-DIABETES: ICD-10-CM

## 2023-10-02 DIAGNOSIS — I48.19 PERSISTENT ATRIAL FIBRILLATION (H): ICD-10-CM

## 2023-10-02 DIAGNOSIS — I12.9 HYPERTENSIVE CHRONIC KIDNEY DISEASE WITH STAGE 1 THROUGH STAGE 4 CHRONIC KIDNEY DISEASE, OR UNSPECIFIED CHRONIC KIDNEY DISEASE: ICD-10-CM

## 2023-10-02 PROCEDURE — G0439 PPPS, SUBSEQ VISIT: HCPCS | Performed by: FAMILY MEDICINE

## 2023-10-02 ASSESSMENT — ACTIVITIES OF DAILY LIVING (ADL): CURRENT_FUNCTION: NO ASSISTANCE NEEDED

## 2023-10-02 ASSESSMENT — ENCOUNTER SYMPTOMS
DYSURIA: 0
NERVOUS/ANXIOUS: 0
PALPITATIONS: 0
DIARRHEA: 0
FEVER: 0
CHILLS: 0
HEADACHES: 0
ABDOMINAL PAIN: 0
COUGH: 0
HEMATURIA: 0
JOINT SWELLING: 0
MYALGIAS: 0
ARTHRALGIAS: 0
SORE THROAT: 0
HEMATOCHEZIA: 0
SHORTNESS OF BREATH: 0
HEARTBURN: 0
PARESTHESIAS: 0
EYE PAIN: 0
CONSTIPATION: 0
FREQUENCY: 0
NAUSEA: 0
WEAKNESS: 0
DIZZINESS: 0

## 2023-10-02 NOTE — PATIENT INSTRUCTIONS
Patient Education   Personalized Prevention Plan  You are due for the preventive services outlined below.  Your care team is available to assist you in scheduling these services.  If you have already completed any of these items, please share that information with your care team to update in your medical record.  Health Maintenance Due   Topic Date Due     LUNG CANCER SCREENING  Never done     ANNUAL REVIEW OF HM ORDERS  08/23/2023     Flu Vaccine (1) 09/01/2023     COVID-19 Vaccine (7 - 2023-24 season) 09/01/2023

## 2023-10-02 NOTE — PROGRESS NOTES
"SUBJECTIVE:   Quentin is a 73 year old who presents for Preventive Visit.    Are you in the first 12 months of your Medicare coverage?  No    Healthy Habits:     In general, how would you rate your overall health?  Good    Frequency of exercise:  6-7 days/week    Duration of exercise:  45-60 minutes    Do you usually eat at least 4 servings of fruit and vegetables a day, include whole grains    & fiber and avoid regularly eating high fat or \"junk\" foods?  Yes    Taking medications regularly:  Yes    Medication side effects:  None    Ability to successfully perform activities of daily living:  No assistance needed    Home Safety:  No safety concerns identified    Hearing Impairment:  Difficulty following a conversation in a noisy restaurant or crowded room, need to ask people to speak up or repeat themselves and find that men's voices are easier to understand than woman's    In the past 6 months, have you been bothered by leaking of urine?  No    In general, how would you rate your overall mental or emotional health?  Excellent      Today's PHQ-2 Score:       10/2/2023    12:30 PM   PHQ-2 ( 1999 [employer])   Q1: Little interest or pleasure in doing things 0   Q2: Feeling down, depressed or hopeless 0   PHQ-2 Score 0   Q1: Little interest or pleasure in doing things Not at all   Q2: Feeling down, depressed or hopeless Not at all   PHQ-2 Score 0       Have you ever done Advance Care Planning? (For example, a Health Directive, POLST, or a discussion with a medical provider or your loved ones about your wishes): Yes, patient states has an Advance Care Planning document and will bring a copy to the clinic.    Fall risk  Fallen 2 or more times in the past year?: No  Any fall with injury in the past year?: No    Cognitive Screening   1) Repeat 3 items (Leader, Season, Table)    2) Clock draw: NORMAL  3) 3 item recall: Recalls 3 objects  Results: 3 items recalled: COGNITIVE IMPAIRMENT LESS LIKELY    Mini-CogTM Copyright S Elizabeth. " Licensed by the author for use in [facility]; reprinted with permission (martin@[facility]). All rights reserved.      Do you have sleep apnea, excessive snoring or daytime drowsiness? : no    Reviewed and updated as needed this visit by clinical staff                  Reviewed and updated as needed this visit by Provider                 Social History     Tobacco Use     Smoking status: Former     Packs/day: 0.50     Years: 3.00     Pack years: 1.50     Types: Cigarettes     Quit date: 1975     Years since quittin.7     Smokeless tobacco: Never     Tobacco comments:     Smoked during college   Substance Use Topics     Alcohol use: Yes     Alcohol/week: 1.0 standard drink of alcohol     Types: 1 Glasses of wine per week     Comment: 5 days/week             2023    10:31 PM   Alcohol Use   Prescreen: >3 drinks/day or >7 drinks/week? No     Do you have a current opioid prescription? No  Do you use any other controlled substances or medications that are not prescribed by a provider? None      Recent lab work showed pre-diabetes - would like to discuss nutrition referral    Current providers sharing in care for this patient include:   Patient Care Team:  Vivi Miguel MD as PCP - General (Family Practice)  Vivi Miguel MD as Assigned PCP    The following health maintenance items are reviewed in Epic and correct as of today:  Health Maintenance   Topic Date Due     LUNG CANCER SCREENING  Never done     ANNUAL REVIEW OF HM ORDERS  2023     INFLUENZA VACCINE (1) 2023     COVID-19 Vaccine ( season) 2023     MEDICARE ANNUAL WELLNESS VISIT  10/02/2024     FALL RISK ASSESSMENT  10/02/2024     ADVANCE CARE PLANNING  2027     COLORECTAL CANCER SCREENING  2028     LIPID  2028     DTAP/TDAP/TD IMMUNIZATION (3 - Td or Tdap) 2029     HEPATITIS C SCREENING  Completed     PHQ-2 (once per calendar year)  Completed     Pneumococcal Vaccine:  "65+ Years  Completed     ZOSTER IMMUNIZATION  Completed     AORTIC ANEURYSM SCREENING (SYSTEM ASSIGNED)  Completed     IPV IMMUNIZATION  Aged Out     HPV IMMUNIZATION  Aged Out     MENINGITIS IMMUNIZATION  Aged Out     Labs reviewed in EPIC      Review of Systems   Constitutional:  Negative for chills and fever.   HENT:  Negative for congestion, ear pain, hearing loss and sore throat.    Eyes:  Negative for pain and visual disturbance.   Respiratory:  Negative for cough and shortness of breath.    Cardiovascular:  Negative for chest pain, palpitations and peripheral edema.   Gastrointestinal:  Negative for abdominal pain, constipation, diarrhea, heartburn, hematochezia and nausea.   Genitourinary:  Positive for impotence. Negative for dysuria, frequency, genital sores, hematuria, penile discharge and urgency.   Musculoskeletal:  Negative for arthralgias, joint swelling and myalgias.   Skin:  Negative for rash.   Neurological:  Negative for dizziness, weakness, headaches and paresthesias.   Psychiatric/Behavioral:  Negative for mood changes. The patient is not nervous/anxious.          OBJECTIVE:   There were no vitals taken for this visit. Estimated body mass index is 32.91 kg/m  as calculated from the following:    Height as of 7/3/23: 1.816 m (5' 11.5\").    Weight as of 7/3/23: 108.6 kg (239 lb 5 oz).  Physical Exam  Objective:  Vital signs reviewed and stable  General: No acute distress  Psych: Appropriate affect  HEENT: moist mucous membranes, pupils equal, round, reactive to light and accomodation, tympanic membranes are pearly grey bilaterally  Lymph: no cervical or supraclavicular lymphadenopathy  Cardiovascular: Irregularly irregular rhythm with a regular rate with no murmur  Pulmonary: clear to auscultation bilaterally with no wheeze  Abdomen: soft, non tender, non distended with normo-active bowel sounds  Extremities: warm and well perfused with no edema  Skin: warm and dry with no rash          ASSESSMENT " "/ PLAN:   1. Encounter for Medicare annual wellness exam    2. Pre-diabetes  - Nutrition Referral; Future    3. Hypertensive chronic kidney disease with stage 1 through stage 4 chronic kidney disease, or unspecified chronic kidney disease   Protein in urine in 2020 - normal creatinine  - Nutrition Referral; Future    4. Persistent atrial fibrillation (H)  Following with cardiology.  Stable    5. Prostate cancer (H)  Status post resection.  PSA is undetectable.      Patient has been advised of split billing requirements and indicates understanding: Yes      COUNSELING:  Reviewed preventive health counseling, as reflected in patient instructions      BMI:   Estimated body mass index is 32.91 kg/m  as calculated from the following:    Height as of 7/3/23: 1.816 m (5' 11.5\").    Weight as of 7/3/23: 108.6 kg (239 lb 5 oz).   Weight management plan: Discussed healthy diet and exercise guidelines      He reports that he quit smoking about 48 years ago. His smoking use included cigarettes. He has a 1.50 pack-year smoking history. He has never used smokeless tobacco.      Appropriate preventive services were discussed with this patient, including applicable screening as appropriate for fall prevention, nutrition, physical activity, Tobacco-use cessation, weight loss and cognition.  Checklist reviewing preventive services available has been given to the patient.    Reviewed patients plan of care and provided an AVS. The Basic Care Plan (routine screening as documented in Health Maintenance) for Rogers meets the Care Plan requirement. This Care Plan has been established and reviewed with the Patient.          Vivi Miguel MD  [facility]    Identified Health Risks:  I have reviewed Opioid Use Disorder and Substance Use Disorder risk factors and made any needed referrals.   "

## 2023-10-02 NOTE — PROGRESS NOTES
HEART CARE ENCOUNTER NOTE      [facility]  [phone]      Assessment/Recommendations   Assessment:   Persistent atrial fibrillation: On metoprolol 200 mg daily, diltiazem 120 mg daily, Eliquis 5 mg twice a day.  Patient is asymptomatic with his atrial fibrillation, unaware and when he is in it.  His ventricular rates have been well controlled since starting the diltiazem a year ago.   Hypertension: On metoprolol 200 mg daily and diltiazem 100 mg daily both primarily for his atrial fibrillation.  Patient notes that his diastolic number is consistently high at home normally high 80s to high 90s, occasionally 100.  In clinic today blood pressure was 134/96.  Dyslipidemia: On atorvastatin 10 mg daily.  Last lipids 9/22/2023 with LDL of 42.    Plan:   Heart rates have been controlled at home since adding the diltiazem  We will start lisinopril 5 mg daily to try and better control his blood pressure, mainly diastolic hypertension.   Monitor blood pressures daily at home.  If still elevated can call clinic/Gojit message and discuss increasing lisinopril to 10 mg daily.  We will check BMP in 2 weeks.  Patient had a recent blood work 9/22/2023 through PCP including CBC, CMP, lipids which all looked good.  Do not need to repeat labs today  Patient appears to have some chronotropic incompetence likely from the metoprolol and diltiazem.  Discussed decreasing his metoprolol which patient declined.  He notes it is tolerable at this time but he will continue to monitor the symptoms.  If the shortness of breath worsens should follow-up in consider CAD work-up with stress test.        The level of medical decision making during this visit was of moderate complexity.    Follow up in 1 year with Dr. Joseph     History of Present Illness/Subjective    HPI: Rogers Huffman is a 73 year old male with PMHx of atrial fibrillation and hypertension presents for follow-up of medication management.  Patient's atrial  fibrillation was diagnosed in 2018 and has been largely asymptomatic.  Patient has a [id number] score of 2 and started on Eliquis at the time of diagnosis.  Holter monitor in 2018 showed mild tendency toward increased ventricular response and his metoprolol has been being titrated up to 200 mg daily which he is on currently.  At an office visit in 2021 patient was still noting some increased heart rates and was started on diltiazem.  Patient has [id number] score of 2.  On Eliquis 5 mg twice daily.  Given patient is largely asymptomatic with his atrial fibrillation aggressive measures to keep him in sinus rhythm have not been pursued per patient's wishes.     Today patient notes that he has been feeling well.  Patient does note that when biking up hills or climbing multiple flights of stairs that he gets slightly short of breath and takes his heart rate a little bit longer to catch up.  Once he has been working out for a little while, those symptoms improve.  Patient denies any chest pain/pressure at those times.  Patient is fairly active and gets 7500 steps in daily.  Discussed trying to decrease his metoprolol to combat this which patient declined at this time as it is not bothersome to the patient.  We will continue to monitor and if this gets worse should follow-up.  Patient does endorse elevated blood pressure, primarily his diastolic.  Monitors his blood pressure at home 3-4 times a week and notes that the bottom number ranges from high 80s to high 90s.  Systolic between 125-135.     He denies fatigue, lightheadedness, shortness of breath, orthopnea, PND, palpitations, chest pain, abdominal fullness/bloating, and lower extremity edema.      Echocardiogram 1/30/2018 results:    Left ventricle ejection fraction is normal. The calculated left ventricular ejection fraction is 63%.    Left Atrium: Left atrial volume is moderately increased.    Estimated central venous pressure equal to 13 mmHg.    No  "hemodynamically significant valvular heart abnormalities.    No previous study for comparison.    Holter monitor 24-hour 4/6/2018:    Abnormal Holter monitor tracing by virtue of the persistent atrial fibrillation demonstrated throughout the recording interval.    The ventricular response appears to be well controlled.    No symptomatic recordings therefore correlation with patient's clinical events cannot be made.    No significant ventricular arrhythmia    No profound bradycardia or pauses.    Ultrasound aorta AAA screening 8/31/2022:  No abdominal aortic aneurysm. There is mild atheromatous plaque in the abdominal aorta.      Physical Examination  Review of Systems   Vitals: BP (!) 134/96 (BP Location: Right arm, Patient Position: Sitting, Cuff Size: Adult Large)   Pulse 76   Resp 16   Ht 1.8 m (5' 10.87\")   Wt 108.9 kg (240 lb)   BMI 33.60 kg/m    BMI= Body mass index is 33.6 kg/m .  Wt Readings from Last 3 Encounters:   10/03/23 108.9 kg (240 lb)   10/02/23 108.2 kg (238 lb 8 oz)   07/03/23 108.6 kg (239 lb 5 oz)           ENT/Mouth: membranes moist    EYES:  no scleral icterus, normal conjunctivae                    Neck: No carotid bruit or thyromegaly   Chest/Lungs:   lungs are clear to auscultation, no rales or wheezing, equal chest wall expansion    Cardiovascular:   Irregularly irregular.  Normal first and second heart sounds with no murmurs, rubs, or gallops; the carotid, radial and posterior tibial pulses are intact, no edema bilaterally    Abdomen:  no tenderness; bowel sounds are present   Extremities: no cyanosis or clubbing   Skin: no xanthelasma, warm.    Neurologic: normal  bilateral, no tremors     Psychiatric: alert and oriented x3, calm        Please refer above for cardiac ROS details.        Medical History  Surgical History Family History Social History   Past Medical History:   Diagnosis Date    Arthritis 2004    R knee 2018 replaced, Thumbs    Atrial fibrillation (H)     Atrial " fibrillation (H) 2018     pre surgical for prostate cancer    Hypertension     Hypertension 2004    Prostate cancer (H) 2018    Prostate cancer (H) 2018    Restless legs 2014     only at night time     Past Surgical History:   Procedure Laterality Date    ANTERIOR CRUCIATE LIGAMENT REPAIR Right 2018    APPENDECTOMY      APPENDECTOMY  1969    LA LAP,PROSTATECTOMY,RADICAL,W/NERVE SPARE,INCL ROBOTIC Bilateral 2018    Procedure: ROBOTIC ASSISTED RADICAL RETROPUBIC PROSTATECTOMY BILATERAL PELVIC LYMPH NODE DISSECTION LYSIS OF ADHESIONS;  Surgeon: Wm Koo MD;  Location: [facility] OR;  Service: Urology    LA TOTAL KNEE ARTHROPLASTY  2018    right    PROSTATECTOMY  2018    PROSTATECTOMY, LYMPHADENECTOMY  2018    REMOVE HARDWARE LOWER EXTREMITY Right 5/3/2018    Procedure: HARDWARE REMOVAL RIGHT KNEE, LATERAL RELEASE;  Surgeon: Twan Yin MD;  Location: [facility];  Service:     SHOULDER SURGERY  1985    left    SHOULDER SURGERY Left     TONSILLECTOMY      TONSILLECTOMY      ZZC TOTAL KNEE ARTHROPLASTY Right 5/3/2018    Procedure: RIGHT TOTAL KNEE ARTHROPLASTY, COMPUTER-ASSIST;  Surgeon: Twan Yin MD;  Location: [facility];  Service: Orthopedics     Family History   Problem Relation Age of Onset    Leukemia Mother     Pulmonary fibrosis Father     Hypertension Brother     Hypertension Brother     Hyperlipidemia Brother     Hypertension Father     Cancer Maternal Aunt         bone cancer        Social History     Socioeconomic History    Marital status:      Spouse name: Not on file    Number of children: Not on file    Years of education: Not on file    Highest education level: Not on file   Occupational History    Not on file   Tobacco Use    Smoking status: Former     Packs/day: 0.50     Years: 3.00     Pack years: 1.50     Types: Cigarettes     Quit date: 1975     Years since quittin.7    Smokeless tobacco: Never    Tobacco comments:     Smoked  during college   Substance and Sexual Activity    Alcohol use: Yes     Alcohol/week: 1.0 standard drink of alcohol     Types: 1 Glasses of wine per week     Comment: 5 days/week    Drug use: No    Sexual activity: Yes     Partners: Female   Other Topics Concern    Not on file   Social History Narrative    Not on file     Social Determinants of Health     Financial Resource Strain: High Risk (9/21/2023)    Financial Resource Strain     Within the past 12 months, have you or your family members you live with been unable to get utilities (heat, electricity) when it was really needed?: Yes   Food Insecurity: Low Risk  (9/21/2023)    Food Insecurity     Within the past 12 months, did you worry that your food would run out before you got money to buy more?: No     Within the past 12 months, did the food you bought just not last and you didn t have money to get more?: No   Transportation Needs: Low Risk  (9/21/2023)    Transportation Needs     Within the past 12 months, has lack of transportation kept you from medical appointments, getting your medicines, non-medical meetings or appointments, work, or from getting things that you need?: No   Physical Activity: Not on file   Stress: Not on file   Social Connections: Not on file   Interpersonal Safety: Low Risk  (10/2/2023)    Interpersonal Safety     Do you feel physically and emotionally safe where you currently live?: Yes     Within the past 12 months, have you been hit, slapped, kicked or otherwise physically hurt by someone?: No     Within the past 12 months, have you been humiliated or emotionally abused in other ways by your partner or ex-partner?: No   Housing Stability: Low Risk  (9/21/2023)    Housing Stability     Do you have housing? : Yes     Are you worried about losing your housing?: No           Medications  Allergies   Current Outpatient Medications   Medication Sig Dispense Refill    amoxicillin (AMOXIL) 500 MG capsule Take 4 capsules 30-60 minutes prior to  a dental procedure 12 capsule 0    atorvastatin (LIPITOR) 10 MG tablet Take 1 tablet by mouth once daily 90 tablet 0    cholecalciferol (VITAMIN D3) 125 MCG (5000 UT) TABS tablet Take 5,000 Units by mouth daily      diltiazem ER COATED BEADS (CARDIZEM CD/CARTIA XT) 120 MG 24 hr capsule Take 1 capsule (120 mg) by mouth daily -Needs to schedule cardiology follow-up appt for further refills 90 capsule 0    ELIQUIS ANTICOAGULANT 5 MG tablet Take 1 tablet by mouth twice daily 180 tablet 0    fish oil-omega-3 fatty acids 1000 MG capsule Take 2 g by mouth daily      lisinopril (ZESTRIL) 5 MG tablet Take 1 tablet (5 mg) by mouth daily 90 tablet 3    magnesium 250 MG tablet Take 1 tablet by mouth daily       metoprolol succinate ER (TOPROL XL) 100 MG 24 hr tablet Take 2 tablets by mouth once daily 180 tablet 0    multivitamin (CENTRUM SILVER) tablet Take 1 tablet by mouth daily       pramipexole (MIRAPEX) 0.5 MG tablet TAKE 1 & 1/2 (ONE & ONE-HALF) TABLETS BY MOUTH AT BEDTIME (NEEDS  LAB) 135 tablet 0    sildenafil (REVATIO) 20 MG tablet Take 20 mg by mouth daily as needed       tadalafil (CIALIS) 10 MG tablet Take 1-2 tablets (10-20 mg) by mouth daily as needed 30 tablet 0     No Known Allergies       Lab Results    Chemistry/lipid CBC Cardiac Enzymes/BNP/TSH/INR   Recent Labs   Lab Test 09/22/23 0919   CHOL 109   HDL 43   LDL 42   TRIG 121     Recent Labs   Lab Test 09/22/23 0919 08/23/22  1124 11/03/21  0939   LDL 42 52 34     Recent Labs   Lab Test 09/22/23 0919      POTASSIUM 5.2   CHLORIDE 104   CO2 29   *   BUN 21.6   CR 1.13   GFRESTIMATED 69   KOKO 9.8     Recent Labs   Lab Test 09/22/23 0919 08/23/22  1124 11/03/21  0939   CR 1.13 0.90 0.94     Recent Labs   Lab Test 09/22/23 0919   A1C 5.8*          Recent Labs   Lab Test 09/22/23 0919   WBC 7.3   HGB 15.7   HCT 46.7   MCV 92        Recent Labs   Lab Test 09/22/23 0919 11/03/21  0939 05/04/18  0503   HGB 15.7 15.9 12.8*    No results for  input(s): TROPONINI in the last 04154 hours.  Recent Labs   Lab Test 01/27/20  0849   NTBNP 1,325*     Recent Labs   Lab Test 01/26/18  1320   TSH 1.55     Recent Labs   Lab Test 05/03/18  0645 04/16/18  1000   INR 1.12* 1.07        Eva Felix PA-C

## 2023-10-03 ENCOUNTER — OFFICE VISIT (OUTPATIENT)
Dept: CARDIOLOGY | Facility: CLINIC | Age: 73
End: 2023-10-03
Payer: COMMERCIAL

## 2023-10-03 VITALS
DIASTOLIC BLOOD PRESSURE: 96 MMHG | BODY MASS INDEX: 33.6 KG/M2 | HEART RATE: 76 BPM | HEIGHT: 71 IN | WEIGHT: 240 LBS | RESPIRATION RATE: 16 BRPM | SYSTOLIC BLOOD PRESSURE: 134 MMHG

## 2023-10-03 DIAGNOSIS — I10 ESSENTIAL HYPERTENSION: Primary | ICD-10-CM

## 2023-10-03 DIAGNOSIS — E78.5 DYSLIPIDEMIA: ICD-10-CM

## 2023-10-03 DIAGNOSIS — I48.19 PERSISTENT ATRIAL FIBRILLATION (H): ICD-10-CM

## 2023-10-03 PROCEDURE — 99214 OFFICE O/P EST MOD 30 MIN: CPT | Performed by: STUDENT IN AN ORGANIZED HEALTH CARE EDUCATION/TRAINING PROGRAM

## 2023-10-03 RX ORDER — LISINOPRIL 5 MG/1
5 TABLET ORAL DAILY
Qty: 90 TABLET | Refills: 3 | Status: SHIPPED | OUTPATIENT
Start: 2023-10-03 | End: 2024-09-20

## 2023-10-03 NOTE — PATIENT INSTRUCTIONS
Rogers Huffman,    It was a pleasure to see you today at the [facility].     My recommendations after this visit include:    - Start Lisinopril 5mg daily for blood pressure control.   - Monitor blood pressure daily. If still elevated, call clinic and we can increase to 10 mg.   - Blood work in 2 weeks  - Follow up with Dr. Puente in 1 year, sooner if needed.     - Please call [phone], if you have any questions or concerns      Eva Felix PA-C

## 2023-10-03 NOTE — LETTER
10/3/2023    Vivi Miguel MD  [address] [facility] [address]    RE: Rogers Huffman       Dear Colleague,     I had the pleasure of seeing Rogers Huffman in the [facility].    HEART CARE ENCOUNTER NOTE      [facility]  [phone]      Assessment/Recommendations   Assessment:   Persistent atrial fibrillation: On metoprolol 200 mg daily, diltiazem 120 mg daily, Eliquis 5 mg twice a day.  Patient is asymptomatic with his atrial fibrillation, unaware and when he is in it.  His ventricular rates have been well controlled since starting the diltiazem a year ago.   Hypertension: On metoprolol 200 mg daily and diltiazem 100 mg daily both primarily for his atrial fibrillation.  Patient notes that his diastolic number is consistently high at home normally high 80s to high 90s, occasionally 100.  In clinic today blood pressure was 134/96.  Dyslipidemia: On atorvastatin 10 mg daily.  Last lipids 9/22/2023 with LDL of 42.    Plan:   Heart rates have been controlled at home since adding the diltiazem  We will start lisinopril 5 mg daily to try and better control his blood pressure, mainly diastolic hypertension.   Monitor blood pressures daily at home.  If still elevated can call clinic/[employer] message and discuss increasing lisinopril to 10 mg daily.  We will check BMP in 2 weeks.  Patient had a recent blood work 9/22/2023 through PCP including CBC, CMP, lipids which all looked good.  Do not need to repeat labs today  Patient appears to have some chronotropic incompetence likely from the metoprolol and diltiazem.  Discussed decreasing his metoprolol which patient declined.  He notes it is tolerable at this time but he will continue to monitor the symptoms.  If the shortness of breath worsens should follow-up in consider CAD work-up with stress test.        The level of medical decision making during this visit was of moderate complexity.    Follow up in 1 year with   Jake     History of Present Illness/Subjective    HPI: Rogers Huffman is a 73 year old male with PMHx of atrial fibrillation and hypertension presents for follow-up of medication management.  Patient's atrial fibrillation was diagnosed in 2018 and has been largely asymptomatic.  Patient has a [id number] score of 2 and started on Eliquis at the time of diagnosis.  Holter monitor in 2018 showed mild tendency toward increased ventricular response and his metoprolol has been being titrated up to 200 mg daily which he is on currently.  At an office visit in 2021 patient was still noting some increased heart rates and was started on diltiazem.  Patient has [id number] score of 2.  On Eliquis 5 mg twice daily.  Given patient is largely asymptomatic with his atrial fibrillation aggressive measures to keep him in sinus rhythm have not been pursued per patient's wishes.     Today patient notes that he has been feeling well.  Patient does note that when biking up hills or climbing multiple flights of stairs that he gets slightly short of breath and takes his heart rate a little bit longer to catch up.  Once he has been working out for a little while, those symptoms improve.  Patient denies any chest pain/pressure at those times.  Patient is fairly active and gets 7500 steps in daily.  Discussed trying to decrease his metoprolol to combat this which patient declined at this time as it is not bothersome to the patient.  We will continue to monitor and if this gets worse should follow-up.  Patient does endorse elevated blood pressure, primarily his diastolic.  Monitors his blood pressure at home 3-4 times a week and notes that the bottom number ranges from high 80s to high 90s.  Systolic between 125-135.     He denies fatigue, lightheadedness, shortness of breath, orthopnea, PND, palpitations, chest pain, abdominal fullness/bloating, and lower extremity edema.      Echocardiogram 1/30/2018 results:    Left ventricle  "ejection fraction is normal. The calculated left ventricular ejection fraction is 63%.    Left Atrium: Left atrial volume is moderately increased.    Estimated central venous pressure equal to 13 mmHg.    No hemodynamically significant valvular heart abnormalities.    No previous study for comparison.    Holter monitor 24-hour 4/6/2018:    Abnormal Holter monitor tracing by virtue of the persistent atrial fibrillation demonstrated throughout the recording interval.    The ventricular response appears to be well controlled.    No symptomatic recordings therefore correlation with patient's clinical events cannot be made.    No significant ventricular arrhythmia    No profound bradycardia or pauses.    Ultrasound aorta AAA screening 8/31/2022:  No abdominal aortic aneurysm. There is mild atheromatous plaque in the abdominal aorta.      Physical Examination  Review of Systems   Vitals: BP (!) 134/96 (BP Location: Right arm, Patient Position: Sitting, Cuff Size: Adult Large)   Pulse 76   Resp 16   Ht 1.8 m (5' 10.87\")   Wt 108.9 kg (240 lb)   BMI 33.60 kg/m    BMI= Body mass index is 33.6 kg/m .  Wt Readings from Last 3 Encounters:   10/03/23 108.9 kg (240 lb)   10/02/23 108.2 kg (238 lb 8 oz)   07/03/23 108.6 kg (239 lb 5 oz)           ENT/Mouth: membranes moist    EYES:  no scleral icterus, normal conjunctivae                    Neck: No carotid bruit or thyromegaly   Chest/Lungs:   lungs are clear to auscultation, no rales or wheezing, equal chest wall expansion    Cardiovascular:   Irregularly irregular.  Normal first and second heart sounds with no murmurs, rubs, or gallops; the carotid, radial and posterior tibial pulses are intact, no edema bilaterally    Abdomen:  no tenderness; bowel sounds are present   Extremities: no cyanosis or clubbing   Skin: no xanthelasma, warm.    Neurologic: normal  bilateral, no tremors     Psychiatric: alert and oriented x3, calm        Please refer above for cardiac ROS " details.        Medical History  Surgical History Family History Social History   Past Medical History:   Diagnosis Date    Arthritis 2004    R knee 2018 replaced, Thumbs    Atrial fibrillation (H)     Atrial fibrillation (H) 01/2018     pre surgical for prostate cancer    Hypertension     Hypertension 2004    Prostate cancer (H) 2018    Prostate cancer (H) 2018    Restless legs 2014     only at night time     Past Surgical History:   Procedure Laterality Date    ANTERIOR CRUCIATE LIGAMENT REPAIR Right 2018    APPENDECTOMY      APPENDECTOMY  1969    MT LAP,PROSTATECTOMY,RADICAL,W/NERVE SPARE,INCL ROBOTIC Bilateral 2/6/2018    Procedure: ROBOTIC ASSISTED RADICAL RETROPUBIC PROSTATECTOMY BILATERAL PELVIC LYMPH NODE DISSECTION LYSIS OF ADHESIONS;  Surgeon: Wm Koo MD;  Location: [facility] Main OR;  Service: Urology    MT TOTAL KNEE ARTHROPLASTY  2018    right    PROSTATECTOMY  2018    PROSTATECTOMY, LYMPHADENECTOMY  2018    REMOVE HARDWARE LOWER EXTREMITY Right 5/3/2018    Procedure: HARDWARE REMOVAL RIGHT KNEE, LATERAL RELEASE;  Surgeon: Twan Yin MD;  Location: [facility] OR;  Service:     SHOULDER SURGERY  1985    left    SHOULDER SURGERY Left     TONSILLECTOMY      TONSILLECTOMY      ZZC TOTAL KNEE ARTHROPLASTY Right 5/3/2018    Procedure: RIGHT TOTAL KNEE ARTHROPLASTY, COMPUTER-ASSIST;  Surgeon: Twan Yin MD;  Location: [facility] OR;  Service: Orthopedics     Family History   Problem Relation Age of Onset    Leukemia Mother     Pulmonary fibrosis Father     Hypertension Brother     Hypertension Brother     Hyperlipidemia Brother     Hypertension Father     Cancer Maternal Aunt         bone cancer        Social History     Socioeconomic History    Marital status:      Spouse name: Not on file    Number of children: Not on file    Years of education: Not on file    Highest education level: Not on file   Occupational History    Not on file   Tobacco Use    Smoking  status: Former     Packs/day: 0.50     Years: 3.00     Pack years: 1.50     Types: Cigarettes     Quit date: 1975     Years since quittin.7    Smokeless tobacco: Never    Tobacco comments:     Smoked during college   Substance and Sexual Activity    Alcohol use: Yes     Alcohol/week: 1.0 standard drink of alcohol     Types: 1 Glasses of wine per week     Comment: 5 days/week    Drug use: No    Sexual activity: Yes     Partners: Female   Other Topics Concern    Not on file   Social History Narrative    Not on file     Social Determinants of Health     Financial Resource Strain: High Risk (2023)    Financial Resource Strain     Within the past 12 months, have you or your family members you live with been unable to get utilities (heat, electricity) when it was really needed?: Yes   Food Insecurity: Low Risk  (2023)    Food Insecurity     Within the past 12 months, did you worry that your food would run out before you got money to buy more?: No     Within the past 12 months, did the food you bought just not last and you didn t have money to get more?: No   Transportation Needs: Low Risk  (2023)    Transportation Needs     Within the past 12 months, has lack of transportation kept you from medical appointments, getting your medicines, non-medical meetings or appointments, work, or from getting things that you need?: No   Physical Activity: Not on file   Stress: Not on file   Social Connections: Not on file   Interpersonal Safety: Low Risk  (10/2/2023)    Interpersonal Safety     Do you feel physically and emotionally safe where you currently live?: Yes     Within the past 12 months, have you been hit, slapped, kicked or otherwise physically hurt by someone?: No     Within the past 12 months, have you been humiliated or emotionally abused in other ways by your partner or ex-partner?: No   Housing Stability: Low Risk  (2023)    Housing Stability     Do you have housing? : Yes     Are you  worried about losing your housing?: No           Medications  Allergies   Current Outpatient Medications   Medication Sig Dispense Refill    amoxicillin (AMOXIL) 500 MG capsule Take 4 capsules 30-60 minutes prior to a dental procedure 12 capsule 0    atorvastatin (LIPITOR) 10 MG tablet Take 1 tablet by mouth once daily 90 tablet 0    cholecalciferol (VITAMIN D3) 125 MCG (5000 UT) TABS tablet Take 5,000 Units by mouth daily      diltiazem ER COATED BEADS (CARDIZEM CD/CARTIA XT) 120 MG 24 hr capsule Take 1 capsule (120 mg) by mouth daily -Needs to schedule cardiology follow-up appt for further refills 90 capsule 0    ELIQUIS ANTICOAGULANT 5 MG tablet Take 1 tablet by mouth twice daily 180 tablet 0    fish oil-omega-3 fatty acids 1000 MG capsule Take 2 g by mouth daily      lisinopril (ZESTRIL) 5 MG tablet Take 1 tablet (5 mg) by mouth daily 90 tablet 3    magnesium 250 MG tablet Take 1 tablet by mouth daily       metoprolol succinate ER (TOPROL XL) 100 MG 24 hr tablet Take 2 tablets by mouth once daily 180 tablet 0    multivitamin (CENTRUM SILVER) tablet Take 1 tablet by mouth daily       pramipexole (MIRAPEX) 0.5 MG tablet TAKE 1 & 1/2 (ONE & ONE-HALF) TABLETS BY MOUTH AT BEDTIME (NEEDS  LAB) 135 tablet 0    sildenafil (REVATIO) 20 MG tablet Take 20 mg by mouth daily as needed       tadalafil (CIALIS) 10 MG tablet Take 1-2 tablets (10-20 mg) by mouth daily as needed 30 tablet 0     No Known Allergies       Lab Results    Chemistry/lipid CBC Cardiac Enzymes/BNP/TSH/INR   Recent Labs   Lab Test 09/22/23 0919   CHOL 109   HDL 43   LDL 42   TRIG 121     Recent Labs   Lab Test 09/22/23 0919 08/23/22  1124 11/03/21  0939   LDL 42 52 34     Recent Labs   Lab Test 09/22/23 0919      POTASSIUM 5.2   CHLORIDE 104   CO2 29   *   BUN 21.6   CR 1.13   GFRESTIMATED 69   KOKO 9.8     Recent Labs   Lab Test 09/22/23 0919 08/23/22  1124 11/03/21  0939   CR 1.13 0.90 0.94     Recent Labs   Lab Test 09/22/23 0919   A1C  5.8*          Recent Labs   Lab Test 09/22/23  0919   WBC 7.3   HGB 15.7   HCT 46.7   MCV 92        Recent Labs   Lab Test 09/22/23  0919 11/03/21  0939 05/04/18  0503   HGB 15.7 15.9 12.8*    No results for input(s): TROPONINI in the last 24009 hours.  Recent Labs   Lab Test 01/27/20  0849   NTBNP 1,325*     Recent Labs   Lab Test 01/26/18  1320   TSH 1.55     Recent Labs   Lab Test 05/03/18  0645 04/16/18  1000   INR 1.12* 1.07        Eva Felix PA-C      Thank you for allowing me to participate in the care of your patient.      Sincerely,     Eva Felix PA-C     [facility]  cc:   Rakel Joseph MD  [address]  [place] [address]

## 2023-10-16 ENCOUNTER — MYC MEDICAL ADVICE (OUTPATIENT)
Dept: CARDIOLOGY | Facility: CLINIC | Age: 73
End: 2023-10-16

## 2023-10-16 ENCOUNTER — LAB (OUTPATIENT)
Dept: CARDIOLOGY | Facility: CLINIC | Age: 73
End: 2023-10-16
Payer: COMMERCIAL

## 2023-10-16 DIAGNOSIS — I10 ESSENTIAL HYPERTENSION: ICD-10-CM

## 2023-10-16 DIAGNOSIS — I10 ESSENTIAL HYPERTENSION: Primary | ICD-10-CM

## 2023-10-16 LAB
ANION GAP SERPL CALCULATED.3IONS-SCNC: 10 MMOL/L (ref 7–15)
BUN SERPL-MCNC: 21 MG/DL (ref 8–23)
CALCIUM SERPL-MCNC: 10 MG/DL (ref 8.8–10.2)
CHLORIDE SERPL-SCNC: 100 MMOL/L (ref 98–107)
CREAT SERPL-MCNC: 1.14 MG/DL (ref 0.67–1.17)
DEPRECATED HCO3 PLAS-SCNC: 30 MMOL/L (ref 22–29)
EGFRCR SERPLBLD CKD-EPI 2021: 69 ML/MIN/1.73M2
GLUCOSE SERPL-MCNC: 95 MG/DL (ref 70–99)
POTASSIUM SERPL-SCNC: 4.8 MMOL/L (ref 3.4–5.3)
SODIUM SERPL-SCNC: 140 MMOL/L (ref 135–145)

## 2023-10-16 PROCEDURE — 36415 COLL VENOUS BLD VENIPUNCTURE: CPT

## 2023-10-16 PROCEDURE — 80048 BASIC METABOLIC PNL TOTAL CA: CPT

## 2023-10-17 NOTE — TELEPHONE ENCOUNTER
BMP order is in place. Message sent to scheduling to arrange.  ----------------------------------------  Eva Felix PA-C  You6 minutes ago (8:10 AM)     RO  My chart message sent. Please help schedule BMP in 2 weeks.

## 2023-10-18 ENCOUNTER — PATIENT OUTREACH (OUTPATIENT)
Dept: GASTROENTEROLOGY | Facility: CLINIC | Age: 73
End: 2023-10-18
Payer: COMMERCIAL

## 2023-10-29 ENCOUNTER — MYC MEDICAL ADVICE (OUTPATIENT)
Dept: FAMILY MEDICINE | Facility: CLINIC | Age: 73
End: 2023-10-29
Payer: COMMERCIAL

## 2023-10-30 ENCOUNTER — LAB (OUTPATIENT)
Dept: CARDIOLOGY | Facility: CLINIC | Age: 73
End: 2023-10-30
Payer: COMMERCIAL

## 2023-10-30 DIAGNOSIS — I10 ESSENTIAL HYPERTENSION: ICD-10-CM

## 2023-10-30 LAB
ANION GAP SERPL CALCULATED.3IONS-SCNC: 12 MMOL/L (ref 7–15)
BUN SERPL-MCNC: 17.3 MG/DL (ref 8–23)
CALCIUM SERPL-MCNC: 9.7 MG/DL (ref 8.8–10.2)
CHLORIDE SERPL-SCNC: 102 MMOL/L (ref 98–107)
CREAT SERPL-MCNC: 1.09 MG/DL (ref 0.67–1.17)
DEPRECATED HCO3 PLAS-SCNC: 27 MMOL/L (ref 22–29)
EGFRCR SERPLBLD CKD-EPI 2021: 73 ML/MIN/1.73M2
GLUCOSE SERPL-MCNC: 93 MG/DL (ref 70–99)
POTASSIUM SERPL-SCNC: 4.6 MMOL/L (ref 3.4–5.3)
SODIUM SERPL-SCNC: 141 MMOL/L (ref 135–145)

## 2023-10-30 PROCEDURE — 80048 BASIC METABOLIC PNL TOTAL CA: CPT

## 2023-10-30 PROCEDURE — 36415 COLL VENOUS BLD VENIPUNCTURE: CPT

## 2023-11-19 DIAGNOSIS — G25.81 RESTLESS LEGS SYNDROME (RLS): ICD-10-CM

## 2023-11-19 DIAGNOSIS — I48.0 PAROXYSMAL ATRIAL FIBRILLATION (H): ICD-10-CM

## 2023-11-20 RX ORDER — PRAMIPEXOLE DIHYDROCHLORIDE 0.5 MG/1
TABLET ORAL
Qty: 135 TABLET | Refills: 0 | Status: SHIPPED | OUTPATIENT
Start: 2023-11-20 | End: 2024-02-19

## 2023-11-20 RX ORDER — APIXABAN 5 MG/1
TABLET, FILM COATED ORAL
Qty: 180 TABLET | Refills: 0 | Status: SHIPPED | OUTPATIENT
Start: 2023-11-20 | End: 2024-02-19

## 2023-11-20 NOTE — TELEPHONE ENCOUNTER
"    Requested Prescriptions   Pending Prescriptions Disp Refills    pramipexole (MIRAPEX) 0.5 MG tablet [Pharmacy Med Name: Pramipexole Dihydrochloride 0.5 MG Oral Tablet] 135 tablet 0     Sig: TAKE 1 & 1/2 (ONE & ONE-HALF) TABLETS BY MOUTH AT BEDTIME       Antiparkinson's Agents Protocol Failed - 11/19/2023 12:34 PM        Failed - Blood pressure under 140/90 in past 12 months     BP Readings from Last 3 Encounters:   10/03/23 (!) 134/96   10/02/23 132/84   07/03/23 134/88                 Passed - CBC on record in past 12 months     Recent Labs   Lab Test 09/22/23 0919   WBC 7.3   RBC 5.10   HGB 15.7   HCT 46.7                    Passed - ALT on record in past 12 months         Recent Labs   Lab Test 09/22/23  0919   ALT 25             Passed - Serum Creatinine on file in past 12 months     Recent Labs   Lab Test 10/30/23  0803   CR 1.09       Ok to refill medication if creatinine is low          Passed - Medication is active on med list        Passed - Patient is age 18 or older        Passed - Recent (6 mo) or future (30 days) visit within the authorizing provider's specialty     Patient had office visit in the last 6 months or has a visit in the next 30 days with authorizing provider or within the authorizing provider's specialty.  See \"Patient Info\" tab in inbasket, or \"Choose Columns\" in Meds & Orders section of the refill encounter.              ELIQUIS ANTICOAGULANT 5 MG tablet [Pharmacy Med Name: Eliquis 5 MG Oral Tablet] 180 tablet 0     Sig: Take 1 tablet by mouth twice daily       Direct Oral Anticoagulant Agents Passed - 11/19/2023 12:34 PM        Passed - Normal Platelets on file in past 12 months     Recent Labs   Lab Test 09/22/23  0919                  Passed - Medication is active on med list        Passed - Patient is 18-79 years of age        Passed - Serum creatinine less than or equal to 1.4 on file in past 12 mos     Recent Labs   Lab Test 10/30/23  0803   CR 1.09       Ok to " refill medication if creatinine is low          Passed - Weight is greater than 60 kg for the past year     Wt Readings from Last 3 Encounters:   10/03/23 108.9 kg (240 lb)   10/02/23 108.2 kg (238 lb 8 oz)   07/03/23 108.6 kg (239 lb 5 oz)             Passed - Recent (6 mo) or future (30 days) visit within the authorizing provider's specialty                 Shira Arevalo RN 11/20/23 12:44 PM

## 2023-12-20 DIAGNOSIS — I10 BENIGN ESSENTIAL HYPERTENSION: ICD-10-CM

## 2023-12-20 DIAGNOSIS — I48.19 PERSISTENT ATRIAL FIBRILLATION (H): ICD-10-CM

## 2023-12-20 DIAGNOSIS — E78.5 HYPERLIPIDEMIA LDL GOAL <100: ICD-10-CM

## 2023-12-21 RX ORDER — DILTIAZEM HYDROCHLORIDE 120 MG/1
CAPSULE, COATED, EXTENDED RELEASE ORAL
Qty: 90 CAPSULE | Refills: 3 | Status: SHIPPED | OUTPATIENT
Start: 2023-12-21

## 2023-12-21 RX ORDER — ATORVASTATIN CALCIUM 10 MG/1
TABLET, FILM COATED ORAL
Qty: 90 TABLET | Refills: 2 | Status: SHIPPED | OUTPATIENT
Start: 2023-12-21 | End: 2024-09-20

## 2024-02-18 DIAGNOSIS — G25.81 RESTLESS LEGS SYNDROME (RLS): ICD-10-CM

## 2024-02-18 DIAGNOSIS — I48.0 PAROXYSMAL ATRIAL FIBRILLATION (H): ICD-10-CM

## 2024-02-19 RX ORDER — PRAMIPEXOLE DIHYDROCHLORIDE 0.5 MG/1
TABLET ORAL
Qty: 135 TABLET | Refills: 0 | Status: SHIPPED | OUTPATIENT
Start: 2024-02-19 | End: 2024-05-21

## 2024-02-19 RX ORDER — METOPROLOL SUCCINATE 100 MG/1
200 TABLET, EXTENDED RELEASE ORAL DAILY
Qty: 180 TABLET | Refills: 0 | Status: SHIPPED | OUTPATIENT
Start: 2024-02-19 | End: 2024-05-21

## 2024-02-19 RX ORDER — APIXABAN 5 MG/1
TABLET, FILM COATED ORAL
Qty: 180 TABLET | Refills: 1 | Status: SHIPPED | OUTPATIENT
Start: 2024-02-19 | End: 2024-08-20

## 2024-02-27 ENCOUNTER — MYC MEDICAL ADVICE (OUTPATIENT)
Dept: FAMILY MEDICINE | Facility: CLINIC | Age: 74
End: 2024-02-27
Payer: COMMERCIAL

## 2024-05-20 DIAGNOSIS — G25.81 RESTLESS LEGS SYNDROME (RLS): ICD-10-CM

## 2024-05-20 DIAGNOSIS — I48.0 PAROXYSMAL ATRIAL FIBRILLATION (H): ICD-10-CM

## 2024-05-21 RX ORDER — PRAMIPEXOLE DIHYDROCHLORIDE 0.5 MG/1
TABLET ORAL
Qty: 135 TABLET | Refills: 0 | Status: SHIPPED | OUTPATIENT
Start: 2024-05-21 | End: 2024-08-20

## 2024-05-21 RX ORDER — METOPROLOL SUCCINATE 100 MG/1
200 TABLET, EXTENDED RELEASE ORAL DAILY
Qty: 180 TABLET | Refills: 0 | Status: SHIPPED | OUTPATIENT
Start: 2024-05-21 | End: 2024-08-20

## 2024-08-19 DIAGNOSIS — I48.0 PAROXYSMAL ATRIAL FIBRILLATION (H): ICD-10-CM

## 2024-08-19 DIAGNOSIS — G25.81 RESTLESS LEGS SYNDROME (RLS): ICD-10-CM

## 2024-08-20 RX ORDER — APIXABAN 5 MG/1
TABLET, FILM COATED ORAL
Qty: 180 TABLET | Refills: 0 | Status: SHIPPED | OUTPATIENT
Start: 2024-08-20

## 2024-08-20 RX ORDER — PRAMIPEXOLE DIHYDROCHLORIDE 0.5 MG/1
TABLET ORAL
Qty: 135 TABLET | Refills: 0 | Status: SHIPPED | OUTPATIENT
Start: 2024-08-20

## 2024-08-20 RX ORDER — METOPROLOL SUCCINATE 100 MG/1
200 TABLET, EXTENDED RELEASE ORAL DAILY
Qty: 180 TABLET | Refills: 0 | Status: SHIPPED | OUTPATIENT
Start: 2024-08-20

## 2024-09-03 ENCOUNTER — PATIENT OUTREACH (OUTPATIENT)
Dept: CARE COORDINATION | Facility: CLINIC | Age: 74
End: 2024-09-03
Payer: COMMERCIAL

## 2024-09-17 ENCOUNTER — PATIENT OUTREACH (OUTPATIENT)
Dept: CARE COORDINATION | Facility: CLINIC | Age: 74
End: 2024-09-17
Payer: COMMERCIAL

## 2024-09-20 DIAGNOSIS — E78.5 HYPERLIPIDEMIA LDL GOAL <100: ICD-10-CM

## 2024-09-20 DIAGNOSIS — I10 ESSENTIAL HYPERTENSION: ICD-10-CM

## 2024-09-20 DIAGNOSIS — I10 BENIGN ESSENTIAL HYPERTENSION: ICD-10-CM

## 2024-09-20 RX ORDER — ATORVASTATIN CALCIUM 10 MG/1
10 TABLET, FILM COATED ORAL DAILY
Qty: 100 TABLET | Refills: 0 | Status: SHIPPED | OUTPATIENT
Start: 2024-09-20

## 2024-09-20 RX ORDER — LISINOPRIL 5 MG/1
5 TABLET ORAL DAILY
Qty: 100 TABLET | Refills: 0 | Status: SHIPPED | OUTPATIENT
Start: 2024-09-20

## 2024-09-20 NOTE — TELEPHONE ENCOUNTER
Patient has [facility] coverage and with this insurance plan, the patient is eligible to receive certain prescriptions as a 100-day supply at the 90-day supply cost.      Prescriptions updated to 100-day supply per protocol: Jennifer HowellD, [facility]  Population Health Pharmacist  [phone]

## 2024-09-20 NOTE — TELEPHONE ENCOUNTER
Patient has [facility] coverage and with this insurance plan, the patient is eligible to receive certain prescriptions as a 100-day supply at the 90-day supply cost.      Prescriptions to be updated to 100-day supply: lisinopril    New prescription needed given insufficient refills remaining so pended for prescriber review and signature.       Thank you!    Kenzie Loera, PharmD, [facility]  Population Health Pharmacist  [phone]

## 2024-11-17 ENCOUNTER — HEALTH MAINTENANCE LETTER (OUTPATIENT)
Age: 74
End: 2024-11-17

## 2025-01-08 DIAGNOSIS — I10 ESSENTIAL HYPERTENSION: ICD-10-CM

## 2025-01-08 RX ORDER — LISINOPRIL 5 MG/1
5 TABLET ORAL DAILY
Qty: 90 TABLET | Refills: 0 | OUTPATIENT
Start: 2025-01-08

## 2025-01-08 NOTE — TELEPHONE ENCOUNTER
"Noted pt last seen 10/3/23 with recommendations to follow-up in 1 year. Per scheduling notes: \"1/7/25 PATIENT WILL CALL BACK TO SCHEDULE MAY APPT, WHEN HE RETURNS FROM [place],  \".     Noted refill for Diltiazem refused yesterday as appt needed. Refill refused. Defer to PCP for refills. Needs appt. LMS  "

## 2025-07-07 ENCOUNTER — OFFICE VISIT (OUTPATIENT)
Dept: CARDIOLOGY | Facility: CLINIC | Age: 75
End: 2025-07-07
Attending: STUDENT IN AN ORGANIZED HEALTH CARE EDUCATION/TRAINING PROGRAM
Payer: COMMERCIAL

## 2025-07-07 VITALS
SYSTOLIC BLOOD PRESSURE: 136 MMHG | OXYGEN SATURATION: 96 % | HEART RATE: 76 BPM | RESPIRATION RATE: 20 BRPM | DIASTOLIC BLOOD PRESSURE: 86 MMHG

## 2025-07-07 DIAGNOSIS — E78.5 DYSLIPIDEMIA: Primary | ICD-10-CM

## 2025-07-07 DIAGNOSIS — I48.19 PERSISTENT ATRIAL FIBRILLATION (H): ICD-10-CM

## 2025-07-07 DIAGNOSIS — I10 ESSENTIAL HYPERTENSION: ICD-10-CM

## 2025-07-07 PROCEDURE — 3079F DIAST BP 80-89 MM HG: CPT | Performed by: INTERNAL MEDICINE

## 2025-07-07 PROCEDURE — G2211 COMPLEX E/M VISIT ADD ON: HCPCS | Performed by: INTERNAL MEDICINE

## 2025-07-07 PROCEDURE — 3075F SYST BP GE 130 - 139MM HG: CPT | Performed by: INTERNAL MEDICINE

## 2025-07-07 PROCEDURE — 99214 OFFICE O/P EST MOD 30 MIN: CPT | Performed by: INTERNAL MEDICINE

## 2025-07-07 NOTE — LETTER
7/7/2025    Vivi Miguel MD  [address] [address] [facility] [id number]    RE: Rogers Huffman       Dear Colleague,     I had the pleasure of seeing Rogers Huffman in the [facility].         [facility]   [address], [place] [address]   [email or web address]   OFFICE: [phone]            Impression and Plan     1.  Atrial fibrillation (permanent).  Quentin historically he was felt to have paroxysmal atrial fibrillation no this would now seem to be more permanent.  Specifically, he states that he has wrist device invariably indicates atrial fibrillation.  He has ventricular response historically has been well-controlled.  As noted below, he at times has noted some slight decrease in exercise tolerance, but overall continues to remain fairly asymptomatic with the rhythm disturbance.     Rogers' [id number]  Score is 2 yielding an annual embolic risk of 2.2-2.9%. Rogers was started on apixaban (Eliquis ) for CVA prophylaxis at time of original diagnosis. Recommended:  Continue metoprolol succinate 200 mg daily.  Continue Cardizem  mg daily.  Continue apixaban (Eliquis ) for CVA prophylaxis.  Plan to obtain 72  ambulatory monitor to reassess ventricular response.  Will also obtain an echocardiogram.     2.  Hypertension.  Blood pressure is fairly reasonable in the office today at 136/86 mmHg.    3.  Dyslipidemia.  Continue atorvastatin.     Follow-up and further recommendations pending test results.    35 minutes spent reviewing prior records (including documentation, laboratory studies, cardiac testing/imaging), interview with patient along with physical exam, planning, and subsequent documentation/crafting of note).     The longitudinal plan of care for atrial fibrillation, hypertension, & dyslipidemia was addressed during this visit.?Due to the added complexity in care, I will continue to support Rogers Huffman in the subsequent management  "of this condition(s) and with the ongoing continuity of care of this condition(s)\".           History of Present Illness    Once again I would like to thank you again for asking me to participate in the care of your patient, Rogers Huffman.  As you know, but to reiterate for my own records, Rogers Huffman is a 74 year old male who had initially been seen in the clinic for preoperative evaluation in anticipation of prostate surgery.  His ECG revealed evidence of atrial fibrillation.  Quentin, however, was completely unaware of the rhythm disturbance.     As alluded to above, Quentin historically has been asymptomatic with the rhythm disturbance.  He does come over, on today's interview report perhaps some slight decrease in exercise tolerance though this is quite infrequent.  He states his breathing is comfortable.  He has had some rare lightheadedness, but no pronounced sensation of fainting and the like.    Further review of systems is otherwise negative/noncontributory (medical record and 13 point review of systems reviewed as well and pertinent positives noted).         Cardiac Diagnostics      Echocardiogram 30 January 2018:  Normal left ventricular size and systolic performance with ejection fraction of 60-65%.  No significant valvular heart disease.  Normal right ventricular size and systolic performance.  Moderate left atrial enlargement. Mild right atrial enlargement.    Holter monitor 6 April 2018:  Abnormal Holter monitor tracing by virtue of the persistent atrial fibrillation demonstrated throughout the recording interval.  The ventricular response appears to be well controlled.  No symptomatic recordings therefore correlation with patient's clinical events cannot be made.  No significant ventricular arrhythmia  No profound bradycardia or pauses.    Holter monitor 2 March 2018:  Persistent atrial fibrillation with some mild tendency towards rapid ventricular response during daytime hours.     12-lead ECG " (personally reviewed) 26 January 2018: Sinus rhythm with heart rate of 69 bpm.  Solitary PVC.     12-lead ECG (personally reviewed) 24 January 2018: Atrial fibrillation.  Cannot rule out inferior infarction.         Physical Examination       /86 (BP Location: Left arm, Patient Position: Sitting, Cuff Size: Adult Regular)   Pulse 76   Resp 20   SpO2 96%         Wt Readings from Last 3 Encounters:   10/03/23 108.9 kg (240 lb)   10/02/23 108.2 kg (238 lb 8 oz)   07/03/23 108.6 kg (239 lb 5 oz)       The patient is alert and oriented times three. Sclerae are anicteric. Mucosal membranes are moist. Jugular venous pressure is normal. No significant adenopathy/thyromegally appreciated. Lungs are clear with good expansion. On cardiovascular exam, the patient has an irregular S1 and S2. Abdomen is soft and non-tender. Extremities reveal no clubbing, cyanosis, or edema.       Family History/Social History/Risk Factors   Patient does not smoke.  Family history reviewed, and family history includes Cancer in his maternal aunt; Hyperlipidemia in his brother; Hypertension in his brother, brother, and father; Leukemia in his mother; Pulmonary fibrosis in his father.          Medical History  Surgical History Family History Social History   Past Medical History:   Diagnosis Date     Arthritis 2004    R knee 2018 replaced, Thumbs     Atrial fibrillation (H)      Atrial fibrillation (H) 01/2018     pre surgical for prostate cancer     Hypertension      Hypertension 2004     Prostate cancer (H) 2018     Prostate cancer (H) 2018     Restless legs 2014     only at night time     Past Surgical History:   Procedure Laterality Date     ANTERIOR CRUCIATE LIGAMENT REPAIR Right 2018     APPENDECTOMY       APPENDECTOMY  1969     [place] LAP,PROSTATECTOMY,RADICAL,W/NERVE SPARE,INCL ROBOTIC Bilateral 2/6/2018    Procedure: ROBOTIC ASSISTED RADICAL RETROPUBIC PROSTATECTOMY BILATERAL PELVIC LYMPH NODE DISSECTION LYSIS OF ADHESIONS;  Surgeon:  Wm Koo MD;  Location: [facility] Main OR;  Service: Urology     [place] TOTAL KNEE ARTHROPLASTY  2018    right     PROSTATECTOMY  2018     PROSTATECTOMY, LYMPHADENECTOMY  2018     REMOVE HARDWARE LOWER EXTREMITY Right 5/3/2018    Procedure: HARDWARE REMOVAL RIGHT KNEE, LATERAL RELEASE;  Surgeon: Twan Yin MD;  Location: [facility] OR;  Service:      SHOULDER SURGERY  1985    left     SHOULDER SURGERY Left      TONSILLECTOMY       TONSILLECTOMY       ZZC TOTAL KNEE ARTHROPLASTY Right 5/3/2018    Procedure: RIGHT TOTAL KNEE ARTHROPLASTY, COMPUTER-ASSIST;  Surgeon: Twan Yin MD;  Location: [facility] OR;  Service: Orthopedics     Family History   Problem Relation Age of Onset     Leukemia Mother      Pulmonary fibrosis Father      Hypertension Brother      Hypertension Brother      Hyperlipidemia Brother      Hypertension Father      Cancer Maternal Aunt         bone cancer        Social History     Socioeconomic History     Marital status:      Spouse name: Not on file     Number of children: Not on file     Years of education: Not on file     Highest education level: Not on file   Occupational History     Not on file   Tobacco Use     Smoking status: Former     Current packs/day: 0.00     Average packs/day: 0.5 packs/day for 3.0 years (1.5 ttl pk-yrs)     Types: Cigarettes     Start date: 1972     Quit date: 1975     Years since quittin.5     Smokeless tobacco: Never     Tobacco comments:     Smoked during college   Substance and Sexual Activity     Alcohol use: Yes     Alcohol/week: 1.0 standard drink of alcohol     Types: 1 Glasses of wine per week     Comment: 5 days/week     Drug use: No     Sexual activity: Yes     Partners: Female   Other Topics Concern     Not on file   Social History Narrative     Not on file     Social Drivers of Health     Financial Resource Strain: High Risk (2023)    Financial Resource Strain      Within the past 12 months, have  you or your family members you live with been unable to get utilities (heat, electricity) when it was really needed?: Yes   Food Insecurity: Low Risk  (9/21/2023)    Food Insecurity      Within the past 12 months, did you worry that your food would run out before you got money to buy more?: No      Within the past 12 months, did the food you bought just not last and you didn t have money to get more?: No   Transportation Needs: Low Risk  (9/21/2023)    Transportation Needs      Within the past 12 months, has lack of transportation kept you from medical appointments, getting your medicines, non-medical meetings or appointments, work, or from getting things that you need?: No   Physical Activity: Not on file   Stress: Not on file   Social Connections: Not on file   Interpersonal Safety: Low Risk  (10/2/2023)    Interpersonal Safety      Do you feel physically and emotionally safe where you currently live?: Yes      Within the past 12 months, have you been hit, slapped, kicked or otherwise physically hurt by someone?: No      Within the past 12 months, have you been humiliated or emotionally abused in other ways by your partner or ex-partner?: No   Housing Stability: Low Risk  (9/21/2023)    Housing Stability      Do you have housing? : Yes      Are you worried about losing your housing?: No           Medications  Allergies   Current Outpatient Medications   Medication Sig Dispense Refill     atorvastatin (LIPITOR) 10 MG tablet Take 1 tablet (10 mg) by mouth daily. 100 tablet 0     cholecalciferol (VITAMIN D3) 125 MCG (5000 UT) TABS tablet Take 5,000 Units by mouth daily       diltiazem ER COATED BEADS (CARDIZEM CD/CARTIA XT) 120 MG 24 hr capsule TAKE 1 CAPSULE BY MOUTH ONCE DAILY -NEEDS TO SCHEDULE CARDIOLOGY FOLLOW-UP FOR FURTHER REFILLS 90 capsule 3     ELIQUIS ANTICOAGULANT 5 MG tablet Take 1 tablet by mouth twice daily 180 tablet 0     fish oil-omega-3 fatty acids 1000 MG capsule Take 2 g by mouth daily        "lisinopril (ZESTRIL) 5 MG tablet Take 1 tablet (5 mg) by mouth daily. 100 tablet 0     magnesium 250 MG tablet Take 1 tablet by mouth daily        metoprolol succinate ER (TOPROL XL) 100 MG 24 hr tablet Take 2 tablets by mouth once daily 180 tablet 0     multivitamin (CENTRUM SILVER) tablet Take 1 tablet by mouth daily        pramipexole (MIRAPEX) 0.5 MG tablet TAKE 1 & 1/2 (ONE & ONE-HALF) TABLETS BY MOUTH AT BEDTIME (NEEDS  LAB) 135 tablet 0     sildenafil (REVATIO) 20 MG tablet Take 20 mg by mouth daily as needed        tadalafil (CIALIS) 10 MG tablet Take 1-2 tablets (10-20 mg) by mouth daily as needed 30 tablet 0     amoxicillin (AMOXIL) 500 MG capsule Take 4 capsules 30-60 minutes prior to a dental procedure 12 capsule 0     No Known Allergies       Lab Results    Chemistry/lipid CBC Cardiac Enzymes/BNP/TSH/INR   Recent Labs   Lab Test 09/22/23  0919   CHOL 109   HDL 43   LDL 42   TRIG 121     Recent Labs   Lab Test 09/22/23  0919 08/23/22  1124 11/03/21  0939   LDL 42 52 34     Recent Labs   Lab Test 10/30/23  0803      POTASSIUM 4.6   CHLORIDE 102   CO2 27   GLC 93   BUN 17.3   CR 1.09   GFRESTIMATED 73   KOKO 9.7     Recent Labs   Lab Test 10/30/23  0803 10/16/23  0822 09/22/23  0919   CR 1.09 1.14 1.13     Recent Labs   Lab Test 09/22/23  0919   A1C 5.8*          Recent Labs   Lab Test 09/22/23  0919   WBC 7.3   HGB 15.7   HCT 46.7   MCV 92        Recent Labs   Lab Test 09/22/23  0919 11/03/21  0939 05/04/18  0503   HGB 15.7 15.9 12.8*    No results for input(s): \"TROPONINI\" in the last 01166 hours.  Recent Labs   Lab Test 01/27/20  0849   NTBNP 1,325*     Recent Labs   Lab Test 01/26/18  1320   TSH 1.55     Recent Labs   Lab Test 05/03/18  0645 04/16/18  1000   INR 1.12* 1.07          Medications  Allergies   Current Outpatient Medications   Medication Sig Dispense Refill     atorvastatin (LIPITOR) 10 MG tablet Take 1 tablet (10 mg) by mouth daily. 100 tablet 0     cholecalciferol (VITAMIN D3) " "125 MCG (5000 UT) TABS tablet Take 5,000 Units by mouth daily       diltiazem ER COATED BEADS (CARDIZEM CD/CARTIA XT) 120 MG 24 hr capsule TAKE 1 CAPSULE BY MOUTH ONCE DAILY -NEEDS TO SCHEDULE CARDIOLOGY FOLLOW-UP FOR FURTHER REFILLS 90 capsule 3     ELIQUIS ANTICOAGULANT 5 MG tablet Take 1 tablet by mouth twice daily 180 tablet 0     fish oil-omega-3 fatty acids 1000 MG capsule Take 2 g by mouth daily       lisinopril (ZESTRIL) 5 MG tablet Take 1 tablet (5 mg) by mouth daily. 100 tablet 0     magnesium 250 MG tablet Take 1 tablet by mouth daily        metoprolol succinate ER (TOPROL XL) 100 MG 24 hr tablet Take 2 tablets by mouth once daily 180 tablet 0     multivitamin (CENTRUM SILVER) tablet Take 1 tablet by mouth daily        pramipexole (MIRAPEX) 0.5 MG tablet TAKE 1 & 1/2 (ONE & ONE-HALF) TABLETS BY MOUTH AT BEDTIME (NEEDS  LAB) 135 tablet 0     sildenafil (REVATIO) 20 MG tablet Take 20 mg by mouth daily as needed        tadalafil (CIALIS) 10 MG tablet Take 1-2 tablets (10-20 mg) by mouth daily as needed 30 tablet 0     amoxicillin (AMOXIL) 500 MG capsule Take 4 capsules 30-60 minutes prior to a dental procedure 12 capsule 0      No Known Allergies       Lab Results   Lab Results   Component Value Date     10/30/2023    CO2 27 10/30/2023    CO2 28 08/23/2022    BUN 17.3 10/30/2023    BUN 22 08/23/2022     Lab Results   Component Value Date    WBC 7.3 09/22/2023    HGB 15.7 09/22/2023    HCT 46.7 09/22/2023    MCV 92 09/22/2023     09/22/2023     Lab Results   Component Value Date    CHOL 109 09/22/2023    CHOL 176 01/27/2020    TRIG 121 09/22/2023    TRIG 156 01/27/2020    HDL 43 09/22/2023    HDL 44 01/27/2020     Lab Results   Component Value Date    INR 1.12 05/03/2018     No results found for: \"BNP\"  No results found for: \"CKTOTAL\", \"CKMB\", \"TROPONINI\"  Lab Results   Component Value Date    TSH 1.55 01/26/2018                    Thank you for allowing me to participate in the care of your " patient.      Sincerely,     Rakel Joseph MD     [facility]  cc:   Eva Felix PA-C  [address] [place] [address]

## 2025-07-07 NOTE — PROGRESS NOTES
"       [facility]   [address], [place] [address]   [email or web address]   OFFICE: [phone]            Impression and Plan     1.  Atrial fibrillation (permanent).  Quentin historically he was felt to have paroxysmal atrial fibrillation no this would now seem to be more permanent.  Specifically, he states that he has wrist device invariably indicates atrial fibrillation.  He has ventricular response historically has been well-controlled.  As noted below, he at times has noted some slight decrease in exercise tolerance, but overall continues to remain fairly asymptomatic with the rhythm disturbance.     Rogers' ADRY?DS?-VASc  Score is 2 yielding an annual embolic risk of 2.2-2.9%. Rogers was started on apixaban (Eliquis ) for CVA prophylaxis at time of original diagnosis. Recommended:  Continue metoprolol succinate 200 mg daily.  Continue Cardizem  mg daily.  Continue apixaban (Eliquis ) for CVA prophylaxis.  Plan to obtain 72  ambulatory monitor to reassess ventricular response.  Will also obtain an echocardiogram.     2.  Hypertension.  Blood pressure is fairly reasonable in the office today at 136/86 mmHg.    3.  Dyslipidemia.  Continue atorvastatin.     Follow-up and further recommendations pending test results.    35 minutes spent reviewing prior records (including documentation, laboratory studies, cardiac testing/imaging), interview with patient along with physical exam, planning, and subsequent documentation/crafting of note).     The longitudinal plan of care for atrial fibrillation, hypertension, & dyslipidemia was addressed during this visit.?Due to the added complexity in care, I will continue to support Rogers HERRERA Armida in the subsequent management of this condition(s) and with the ongoing continuity of care of this condition(s)\".           History of Present Illness    Once again I would like to thank you again for asking me to participate in the care " of your patient, Rogers Huffman.  As you know, but to reiterate for my own records, Rogers Huffman is a 74 year old male who had initially been seen in the clinic for preoperative evaluation in anticipation of prostate surgery.  His ECG revealed evidence of atrial fibrillation.  Quentin, however, was completely unaware of the rhythm disturbance.     As alluded to above, Quentin historically has been asymptomatic with the rhythm disturbance.  He does come over, on today's interview report perhaps some slight decrease in exercise tolerance though this is quite infrequent.  He states his breathing is comfortable.  He has had some rare lightheadedness, but no pronounced sensation of fainting and the like.    Further review of systems is otherwise negative/noncontributory (medical record and 13 point review of systems reviewed as well and pertinent positives noted).         Cardiac Diagnostics      Echocardiogram 30 January 2018:  Normal left ventricular size and systolic performance with ejection fraction of 60-65%.  No significant valvular heart disease.  Normal right ventricular size and systolic performance.  Moderate left atrial enlargement. Mild right atrial enlargement.    Holter monitor 6 April 2018:  Abnormal Holter monitor tracing by virtue of the persistent atrial fibrillation demonstrated throughout the recording interval.  The ventricular response appears to be well controlled.  No symptomatic recordings therefore correlation with patient's clinical events cannot be made.  No significant ventricular arrhythmia  No profound bradycardia or pauses.    Holter monitor 2 March 2018:  Persistent atrial fibrillation with some mild tendency towards rapid ventricular response during daytime hours.     12-lead ECG (personally reviewed) 26 January 2018: Sinus rhythm with heart rate of 69 bpm.  Solitary PVC.     12-lead ECG (personally reviewed) 24 January 2018: Atrial fibrillation.  Cannot rule out inferior infarction.          Physical Examination       /86 (BP Location: Left arm, Patient Position: Sitting, Cuff Size: Adult Regular)   Pulse 76   Resp 20   SpO2 96%         Wt Readings from Last 3 Encounters:   10/03/23 108.9 kg (240 lb)   10/02/23 108.2 kg (238 lb 8 oz)   07/03/23 108.6 kg (239 lb 5 oz)       The patient is alert and oriented times three. Sclerae are anicteric. Mucosal membranes are moist. Jugular venous pressure is normal. No significant adenopathy/thyromegally appreciated. Lungs are clear with good expansion. On cardiovascular exam, the patient has an irregular S1 and S2. Abdomen is soft and non-tender. Extremities reveal no clubbing, cyanosis, or edema.       Family History/Social History/Risk Factors   Patient does not smoke.  Family history reviewed, and family history includes Cancer in his maternal aunt; Hyperlipidemia in his brother; Hypertension in his brother, brother, and father; Leukemia in his mother; Pulmonary fibrosis in his father.          Medical History  Surgical History Family History Social History   Past Medical History:   Diagnosis Date    Arthritis 2004    R knee 2018 replaced, Thumbs    Atrial fibrillation (H)     Atrial fibrillation (H) 01/2018     pre surgical for prostate cancer    Hypertension     Hypertension 2004    Prostate cancer (H) 2018    Prostate cancer (H) 2018    Restless legs 2014     only at night time     Past Surgical History:   Procedure Laterality Date    ANTERIOR CRUCIATE LIGAMENT REPAIR Right 2018    APPENDECTOMY      APPENDECTOMY  1969    ID LAP,PROSTATECTOMY,RADICAL,W/NERVE SPARE,INCL ROBOTIC Bilateral 2/6/2018    Procedure: ROBOTIC ASSISTED RADICAL RETROPUBIC PROSTATECTOMY BILATERAL PELVIC LYMPH NODE DISSECTION LYSIS OF ADHESIONS;  Surgeon: Wm Koo MD;  Location: [facility];  Service: Urology    ID TOTAL KNEE ARTHROPLASTY  2018    right    PROSTATECTOMY  2018    PROSTATECTOMY, LYMPHADENECTOMY  2018    REMOVE HARDWARE LOWER EXTREMITY  Right 5/3/2018    Procedure: HARDWARE REMOVAL RIGHT KNEE, LATERAL RELEASE;  Surgeon: Twan Yin MD;  Location: [facility] OR;  Service:     SHOULDER SURGERY  1985    left    SHOULDER SURGERY Left     TONSILLECTOMY      TONSILLECTOMY      ZZC TOTAL KNEE ARTHROPLASTY Right 5/3/2018    Procedure: RIGHT TOTAL KNEE ARTHROPLASTY, COMPUTER-ASSIST;  Surgeon: Twan Yin MD;  Location: [facility];  Service: Orthopedics     Family History   Problem Relation Age of Onset    Leukemia Mother     Pulmonary fibrosis Father     Hypertension Brother     Hypertension Brother     Hyperlipidemia Brother     Hypertension Father     Cancer Maternal Aunt         bone cancer        Social History     Socioeconomic History    Marital status:      Spouse name: Not on file    Number of children: Not on file    Years of education: Not on file    Highest education level: Not on file   Occupational History    Not on file   Tobacco Use    Smoking status: Former     Current packs/day: 0.00     Average packs/day: 0.5 packs/day for 3.0 years (1.5 ttl pk-yrs)     Types: Cigarettes     Start date: 1972     Quit date: 1975     Years since quittin.5    Smokeless tobacco: Never    Tobacco comments:     Smoked during college   Substance and Sexual Activity    Alcohol use: Yes     Alcohol/week: 1.0 standard drink of alcohol     Types: 1 Glasses of wine per week     Comment: 5 days/week    Drug use: No    Sexual activity: Yes     Partners: Female   Other Topics Concern    Not on file   Social History Narrative    Not on file     Social Drivers of Health     Financial Resource Strain: High Risk (2023)    Financial Resource Strain     Within the past 12 months, have you or your family members you live with been unable to get utilities (heat, electricity) when it was really needed?: Yes   Food Insecurity: Low Risk  (2023)    Food Insecurity     Within the past 12 months, did you worry that your food would  run out before you got money to buy more?: No     Within the past 12 months, did the food you bought just not last and you didn t have money to get more?: No   Transportation Needs: Low Risk  (9/21/2023)    Transportation Needs     Within the past 12 months, has lack of transportation kept you from medical appointments, getting your medicines, non-medical meetings or appointments, work, or from getting things that you need?: No   Physical Activity: Not on file   Stress: Not on file   Social Connections: Not on file   Interpersonal Safety: Low Risk  (10/2/2023)    Interpersonal Safety     Do you feel physically and emotionally safe where you currently live?: Yes     Within the past 12 months, have you been hit, slapped, kicked or otherwise physically hurt by someone?: No     Within the past 12 months, have you been humiliated or emotionally abused in other ways by your partner or ex-partner?: No   Housing Stability: Low Risk  (9/21/2023)    Housing Stability     Do you have housing? : Yes     Are you worried about losing your housing?: No           Medications  Allergies   Current Outpatient Medications   Medication Sig Dispense Refill    atorvastatin (LIPITOR) 10 MG tablet Take 1 tablet (10 mg) by mouth daily. 100 tablet 0    cholecalciferol (VITAMIN D3) 125 MCG (5000 UT) TABS tablet Take 5,000 Units by mouth daily      diltiazem ER COATED BEADS (CARDIZEM CD/CARTIA XT) 120 MG 24 hr capsule TAKE 1 CAPSULE BY MOUTH ONCE DAILY -NEEDS TO SCHEDULE CARDIOLOGY FOLLOW-UP FOR FURTHER REFILLS 90 capsule 3    ELIQUIS ANTICOAGULANT 5 MG tablet Take 1 tablet by mouth twice daily 180 tablet 0    fish oil-omega-3 fatty acids 1000 MG capsule Take 2 g by mouth daily      lisinopril (ZESTRIL) 5 MG tablet Take 1 tablet (5 mg) by mouth daily. 100 tablet 0    magnesium 250 MG tablet Take 1 tablet by mouth daily       metoprolol succinate ER (TOPROL XL) 100 MG 24 hr tablet Take 2 tablets by mouth once daily 180 tablet 0    multivitamin  "(CENTRUM SILVER) tablet Take 1 tablet by mouth daily       pramipexole (MIRAPEX) 0.5 MG tablet TAKE 1 & 1/2 (ONE & ONE-HALF) TABLETS BY MOUTH AT BEDTIME (NEEDS  LAB) 135 tablet 0    sildenafil (REVATIO) 20 MG tablet Take 20 mg by mouth daily as needed       tadalafil (CIALIS) 10 MG tablet Take 1-2 tablets (10-20 mg) by mouth daily as needed 30 tablet 0    amoxicillin (AMOXIL) 500 MG capsule Take 4 capsules 30-60 minutes prior to a dental procedure 12 capsule 0     No Known Allergies       Lab Results    Chemistry/lipid CBC Cardiac Enzymes/BNP/TSH/INR   Recent Labs   Lab Test 09/22/23  0919   CHOL 109   HDL 43   LDL 42   TRIG 121     Recent Labs   Lab Test 09/22/23  0919 08/23/22  1124 11/03/21  0939   LDL 42 52 34     Recent Labs   Lab Test 10/30/23  0803      POTASSIUM 4.6   CHLORIDE 102   CO2 27   GLC 93   BUN 17.3   CR 1.09   GFRESTIMATED 73   KOKO 9.7     Recent Labs   Lab Test 10/30/23  0803 10/16/23  0822 09/22/23  0919   CR 1.09 1.14 1.13     Recent Labs   Lab Test 09/22/23  0919   A1C 5.8*          Recent Labs   Lab Test 09/22/23  0919   WBC 7.3   HGB 15.7   HCT 46.7   MCV 92        Recent Labs   Lab Test 09/22/23  0919 11/03/21  0939 05/04/18  0503   HGB 15.7 15.9 12.8*    No results for input(s): \"TROPONINI\" in the last 01213 hours.  Recent Labs   Lab Test 01/27/20  0849   NTBNP 1,325*     Recent Labs   Lab Test 01/26/18  1320   TSH 1.55     Recent Labs   Lab Test 05/03/18  0645 04/16/18  1000   INR 1.12* 1.07          Medications  Allergies   Current Outpatient Medications   Medication Sig Dispense Refill    atorvastatin (LIPITOR) 10 MG tablet Take 1 tablet (10 mg) by mouth daily. 100 tablet 0    cholecalciferol (VITAMIN D3) 125 MCG (5000 UT) TABS tablet Take 5,000 Units by mouth daily      diltiazem ER COATED BEADS (CARDIZEM CD/CARTIA XT) 120 MG 24 hr capsule TAKE 1 CAPSULE BY MOUTH ONCE DAILY -NEEDS TO SCHEDULE CARDIOLOGY FOLLOW-UP FOR FURTHER REFILLS 90 capsule 3    ELIQUIS ANTICOAGULANT 5 " "MG tablet Take 1 tablet by mouth twice daily 180 tablet 0    fish oil-omega-3 fatty acids 1000 MG capsule Take 2 g by mouth daily      lisinopril (ZESTRIL) 5 MG tablet Take 1 tablet (5 mg) by mouth daily. 100 tablet 0    magnesium 250 MG tablet Take 1 tablet by mouth daily       metoprolol succinate ER (TOPROL XL) 100 MG 24 hr tablet Take 2 tablets by mouth once daily 180 tablet 0    multivitamin (CENTRUM SILVER) tablet Take 1 tablet by mouth daily       pramipexole (MIRAPEX) 0.5 MG tablet TAKE 1 & 1/2 (ONE & ONE-HALF) TABLETS BY MOUTH AT BEDTIME (NEEDS  LAB) 135 tablet 0    sildenafil (REVATIO) 20 MG tablet Take 20 mg by mouth daily as needed       tadalafil (CIALIS) 10 MG tablet Take 1-2 tablets (10-20 mg) by mouth daily as needed 30 tablet 0    amoxicillin (AMOXIL) 500 MG capsule Take 4 capsules 30-60 minutes prior to a dental procedure 12 capsule 0      No Known Allergies       Lab Results   Lab Results   Component Value Date     10/30/2023    CO2 27 10/30/2023    CO2 28 08/23/2022    BUN 17.3 10/30/2023    BUN 22 08/23/2022     Lab Results   Component Value Date    WBC 7.3 09/22/2023    HGB 15.7 09/22/2023    HCT 46.7 09/22/2023    MCV 92 09/22/2023     09/22/2023     Lab Results   Component Value Date    CHOL 109 09/22/2023    CHOL 176 01/27/2020    TRIG 121 09/22/2023    TRIG 156 01/27/2020    HDL 43 09/22/2023    HDL 44 01/27/2020     Lab Results   Component Value Date    INR 1.12 05/03/2018     No results found for: \"BNP\"  No results found for: \"CKTOTAL\", \"CKMB\", \"TROPONINI\"  Lab Results   Component Value Date    TSH 1.55 01/26/2018                  "

## 2025-07-07 NOTE — PATIENT INSTRUCTIONS
Follow-up pending test results.    Detail Level: Zone Detail Level: Generalized Detail Level: Simple Detail Level: Detailed

## 2025-07-08 ENCOUNTER — ORDERS ONLY (AUTO-RELEASED) (OUTPATIENT)
Dept: CARDIOLOGY | Facility: CLINIC | Age: 75
End: 2025-07-08
Payer: COMMERCIAL

## 2025-07-08 DIAGNOSIS — I48.19 PERSISTENT ATRIAL FIBRILLATION (H): ICD-10-CM

## 2025-07-10 ENCOUNTER — HOSPITAL ENCOUNTER (OUTPATIENT)
Dept: CARDIOLOGY | Facility: CLINIC | Age: 75
End: 2025-07-10
Attending: INTERNAL MEDICINE
Payer: COMMERCIAL

## 2025-07-10 DIAGNOSIS — I48.19 PERSISTENT ATRIAL FIBRILLATION (H): ICD-10-CM

## 2025-07-10 LAB — LVEF ECHO: NORMAL

## 2025-07-10 PROCEDURE — 93306 TTE W/DOPPLER COMPLETE: CPT

## 2025-07-16 DIAGNOSIS — I10 ESSENTIAL HYPERTENSION: ICD-10-CM

## 2025-07-16 RX ORDER — LISINOPRIL 5 MG/1
5 TABLET ORAL DAILY
Qty: 100 TABLET | Refills: 2 | Status: SHIPPED | OUTPATIENT
Start: 2025-07-16

## 2025-07-26 LAB — CV ZIO PRELIM RESULTS: NORMAL

## 2025-07-29 ENCOUNTER — HOSPITAL ENCOUNTER (OUTPATIENT)
Dept: MRI IMAGING | Facility: HOSPITAL | Age: 75
Discharge: HOME | End: 2025-07-29
Attending: INTERNAL MEDICINE
Payer: COMMERCIAL

## 2025-07-29 DIAGNOSIS — R93.89 ABNORMAL FINDINGS ON DIAGNOSTIC IMAGING OF OTHER SPECIFIED BODY STRUCTURES: ICD-10-CM

## 2025-07-29 DIAGNOSIS — I77.89 ENLARGED THORACIC AORTA: ICD-10-CM

## 2025-07-29 DIAGNOSIS — I10 ESSENTIAL HYPERTENSION: ICD-10-CM

## 2025-07-29 PROCEDURE — 255N000002 HC RX 255 OP 636: Performed by: INTERNAL MEDICINE

## 2025-07-29 PROCEDURE — 71555 MRI ANGIO CHEST W OR W/O DYE: CPT

## 2025-07-29 PROCEDURE — A9585 GADOBUTROL INJECTION: HCPCS | Performed by: INTERNAL MEDICINE

## 2025-07-29 PROCEDURE — 71552 MRI CHEST W/O & W/DYE: CPT

## 2025-07-29 RX ORDER — GADOBUTROL 604.72 MG/ML
15 INJECTION INTRAVENOUS ONCE
Status: COMPLETED | OUTPATIENT
Start: 2025-07-29 | End: 2025-07-29

## 2025-07-29 RX ADMIN — GADOBUTROL 15 ML: 604.72 INJECTION INTRAVENOUS at 08:43

## 2025-07-30 ENCOUNTER — RESULTS FOLLOW-UP (OUTPATIENT)
Dept: CARDIOLOGY | Facility: CLINIC | Age: 75
End: 2025-07-30
Payer: COMMERCIAL

## 2025-07-30 DIAGNOSIS — R93.89 ABNORMAL FINDINGS ON DIAGNOSTIC IMAGING OF OTHER SPECIFIED BODY STRUCTURES: ICD-10-CM

## 2025-07-30 DIAGNOSIS — I10 ESSENTIAL HYPERTENSION: ICD-10-CM

## 2025-07-30 DIAGNOSIS — I77.89 ENLARGED THORACIC AORTA: Primary | ICD-10-CM

## 2025-07-30 NOTE — TELEPHONE ENCOUNTER
"Results/response: \"Last read by Quentin Huffman at 10:35AM on 7/30/2025. \". Follow-up plan as outlined below. LMS  "